# Patient Record
Sex: MALE | Race: WHITE | NOT HISPANIC OR LATINO | Employment: OTHER | URBAN - NONMETROPOLITAN AREA
[De-identification: names, ages, dates, MRNs, and addresses within clinical notes are randomized per-mention and may not be internally consistent; named-entity substitution may affect disease eponyms.]

---

## 2017-01-10 ENCOUNTER — OFFICE VISIT (OUTPATIENT)
Dept: FAMILY MEDICINE CLINIC | Facility: CLINIC | Age: 77
End: 2017-01-10

## 2017-01-10 VITALS
HEIGHT: 70 IN | BODY MASS INDEX: 20.9 KG/M2 | HEART RATE: 92 BPM | OXYGEN SATURATION: 82 % | WEIGHT: 146 LBS | SYSTOLIC BLOOD PRESSURE: 180 MMHG | DIASTOLIC BLOOD PRESSURE: 90 MMHG | TEMPERATURE: 98.6 F

## 2017-01-10 DIAGNOSIS — N52.9 IMPOTENCE: ICD-10-CM

## 2017-01-10 DIAGNOSIS — J06.9 ACUTE URI: ICD-10-CM

## 2017-01-10 DIAGNOSIS — R53.83 MALAISE AND FATIGUE: Primary | ICD-10-CM

## 2017-01-10 DIAGNOSIS — J44.9 CHRONIC OBSTRUCTIVE PULMONARY DISEASE, UNSPECIFIED COPD TYPE (HCC): ICD-10-CM

## 2017-01-10 DIAGNOSIS — R53.81 MALAISE AND FATIGUE: Primary | ICD-10-CM

## 2017-01-10 DIAGNOSIS — M54.9 BACK PAIN, UNSPECIFIED BACK LOCATION, UNSPECIFIED BACK PAIN LATERALITY, UNSPECIFIED CHRONICITY: ICD-10-CM

## 2017-01-10 LAB
EXPIRATION DATE: NORMAL
FLUAV AG NPH QL: NORMAL
FLUBV AG NPH QL: NORMAL
INTERNAL CONTROL: NORMAL
Lab: NORMAL

## 2017-01-10 PROCEDURE — 87804 INFLUENZA ASSAY W/OPTIC: CPT | Performed by: FAMILY MEDICINE

## 2017-01-10 PROCEDURE — 99214 OFFICE O/P EST MOD 30 MIN: CPT | Performed by: FAMILY MEDICINE

## 2017-01-10 PROCEDURE — 96372 THER/PROPH/DIAG INJ SC/IM: CPT | Performed by: FAMILY MEDICINE

## 2017-01-10 RX ORDER — SILDENAFIL 100 MG/1
TABLET, FILM COATED ORAL
Qty: 5 TABLET | Refills: 11 | Status: SHIPPED | OUTPATIENT
Start: 2017-01-10 | End: 2017-02-09 | Stop reason: SDUPTHER

## 2017-01-10 RX ORDER — ALBUTEROL SULFATE 2.5 MG/3ML
2.5 SOLUTION RESPIRATORY (INHALATION)
Qty: 300 ML | Refills: 12 | Status: SHIPPED | OUTPATIENT
Start: 2017-01-10 | End: 2018-06-04 | Stop reason: SDUPTHER

## 2017-01-10 RX ORDER — TRIAMCINOLONE ACETONIDE 40 MG/ML
80 INJECTION, SUSPENSION INTRA-ARTICULAR; INTRAMUSCULAR ONCE
Status: COMPLETED | OUTPATIENT
Start: 2017-01-10 | End: 2017-01-10

## 2017-01-10 RX ORDER — FLUTICASONE PROPIONATE 50 MCG
2 SPRAY, SUSPENSION (ML) NASAL DAILY
Qty: 1 EACH | Refills: 11 | Status: SHIPPED | OUTPATIENT
Start: 2017-01-10 | End: 2017-02-09

## 2017-01-10 RX ORDER — LORATADINE 10 MG/1
10-20 TABLET ORAL DAILY
Qty: 60 TABLET | Refills: 0 | Status: SHIPPED | OUTPATIENT
Start: 2017-01-10 | End: 2017-02-08 | Stop reason: SDUPTHER

## 2017-01-10 RX ORDER — TRAMADOL HYDROCHLORIDE 50 MG/1
50 TABLET ORAL EVERY 6 HOURS PRN
Qty: 120 TABLET | Refills: 2 | Status: SHIPPED | OUTPATIENT
Start: 2017-01-10 | End: 2017-05-17 | Stop reason: SDUPTHER

## 2017-01-10 RX ADMIN — TRIAMCINOLONE ACETONIDE 80 MG: 40 INJECTION, SUSPENSION INTRA-ARTICULAR; INTRAMUSCULAR at 18:02

## 2017-01-10 NOTE — MR AVS SNAPSHOT
Fuad Brumfield   1/10/2017 3:15 PM   Office Visit    Dept Phone:  [phone]   Encounter #:  [id number]    Provider:  Thomas Pierson MD   Department:  [facility]                Your Full Care Plan              Today's Medication Changes          These changes are accurate as of: 1/10/17  4:56 PM.  If you have any questions, ask your nurse or doctor.               New Medication(s)Ordered:     fluticasone 50 MCG/ACT nasal spray   Commonly known as:  FLONASE   2 sprays into each nostril Daily for 30 days. Administer 2 sprays in each nostril for each dose.   Started by:  Thomas Pierson MD       ipratropium 0.02 % nebulizer solution   Commonly known as:  ATROVENT   Take 2.5 mL by nebulization 4 (Four) Times a Day.   Started by:  Thomas Pierson MD         Medication(s)that have changed:     albuterol (2.5 MG/3ML) 0.083% nebulizer solution   Commonly known as:  PROVENTIL   Take 2.5 mg by nebulization 4 (Four) Times a Day. Inhale via nebulizer four times daily as needed.   What changed:    - how much to take  - when to take this   Changed by:  Thomas Pierson MD       loratadine 10 MG tablet   Commonly known as:  CLARITIN   Take 1-2 tablets by mouth Daily. Take one or two tablets daily.   What changed:    - how much to take  - when to take this   Changed by:  Thomas Pierson MD            Where to Get Your Medications      These medications were sent to [facility] [id number] [place], [place] [address] [facility] [phone] [facility] [phone]   [address] [facility] [address]     Phone:  [phone]     albuterol (2.5 MG/3ML) 0.083% nebulizer solution    fluticasone 50 MCG/ACT nasal spray    ipratropium 0.02 % nebulizer solution    loratadine 10 MG tablet    sildenafil 100 MG tablet         You can get these medications from any pharmacy     Bring a paper prescription for each of these medications     traMADol 50 MG  tablet                  Your Updated Medication List          This list is accurate as of: 1/10/17  4:56 PM.  Always use your most recent med list.                albuterol (2.5 MG/3ML) 0.083% nebulizer solution   Commonly known as:  PROVENTIL   Take 2.5 mg by nebulization 4 (Four) Times a Day. Inhale via nebulizer four times daily as needed.       ammonium lactate 12 % lotion   Commonly known as:  LAC-HYDRIN       fluticasone 50 MCG/ACT nasal spray   Commonly known as:  FLONASE   2 sprays into each nostril Daily for 30 days. Administer 2 sprays in each nostril for each dose.       hydrOXYzine 25 MG tablet   Commonly known as:  ATARAX   Take 1 tablet by mouth Every 6 (Six) Hours As Needed for itching.       ipratropium 0.02 % nebulizer solution   Commonly known as:  ATROVENT   Take 2.5 mL by nebulization 4 (Four) Times a Day.       loratadine 10 MG tablet   Commonly known as:  CLARITIN   Take 1-2 tablets by mouth Daily. Take one or two tablets daily.       methylphenidate 10 MG tablet   Commonly known as:  RITALIN       sildenafil 100 MG tablet   Commonly known as:  VIAGRA   1/2 to 1 tablet 30 min up to 4 hours before sex.       traMADol 50 MG tablet   Commonly known as:  ULTRAM   Take 1 tablet by mouth Every 6 (Six) Hours As Needed for moderate pain (4-6) or severe pain (7-10).       traZODone 50 MG tablet   Commonly known as:  DESYREL   1-3 tabs hs prn insomnia.               We Performed the Following     POC Influenza A / B       You Were Diagnosed With        Codes Comments    Malaise and fatigue    -  Primary ICD-10-CM: R53.81, R53.83  ICD-9-CM: 780.79     Impotence     ICD-10-CM: N52.9  ICD-9-CM: 607.84     Back pain, unspecified back location, unspecified back pain laterality, unspecified chronicity     ICD-10-CM: M54.9  ICD-9-CM: 724.5     Acute URI     ICD-10-CM: J06.9  ICD-9-CM: 465.9     Chronic obstructive pulmonary disease, unspecified COPD type     ICD-10-CM: J44.9  ICD-9-CM: 496       Instructions   "   None    Patient Instructions History      Upcoming Appointments     Visit Type Date Time Department    OFFICE VISIT 1/10/2017  3:15 PM [facility]    OFFICE VISIT 4/10/2017  2:00 PM [facility] DAWS[facility]      MyChart Signup     Our records indicate that you have declined [facility] MyChart signup. If you would like to sign up for MyChart, please email [email or web address] or call [phone] to obtain an activation code.             Other Info from Your Visit           Your Appointments     Apr 10, 2017  2:00 PM CDT   Office Visit with Thomas Pierson MD   [facility] (--)    [address]  [facility] [address] [phone]           Arrive 15 minutes prior to appointment.              Allergies     Penicillins        Reason for Visit     Nasal Congestion     URI     Fatigue     Pain back pain      Vital Signs     Blood Pressure Pulse Temperature Height    180/90 (BP Location: Right arm, Patient Position: Sitting, Cuff Size: Adult) 92 98.6 °F (37 °C) (Temporal Artery ) 70\" (177.8 cm)    Weight Oxygen Saturation Body Mass Index Smoking Status    146 lb (66.2 kg) 82% 20.95 kg/m2 Former Smoker      Problems and Diagnoses Noted     Malaise and fatigue    -  Primary    Impotence        Back pain        Acute upper respiratory infection        Chronic airway obstruction            "

## 2017-01-11 NOTE — PROGRESS NOTES
Subjective   Fuad Brumfield is a 76 y.o. male.     History of Present Illness     Needs refills.  Not feeling well since Saturday. Had body aches and chills. No fever.  Saturday was weak and muscles hurt.  viagra not working  Wants refill of ultram.    Review of Systems   Constitutional: Positive for chills and fatigue. Negative for fever.   HENT: Negative for congestion, ear discharge, ear pain, facial swelling, hearing loss, postnasal drip, rhinorrhea, sinus pressure, sore throat, trouble swallowing and voice change.    Eyes: Negative for discharge, redness and visual disturbance.   Respiratory: Negative for cough, chest tightness, shortness of breath and wheezing.    Cardiovascular: Negative for chest pain and palpitations.   Gastrointestinal: Negative for abdominal pain, blood in stool, constipation, diarrhea, nausea and vomiting.   Endocrine: Negative for polydipsia and polyuria.   Genitourinary: Negative for dysuria, flank pain, hematuria and urgency.   Musculoskeletal: Positive for back pain. Negative for arthralgias, joint swelling and myalgias.   Skin: Negative for rash.   Neurological: Negative for dizziness, weakness, numbness and headaches.   Hematological: Negative for adenopathy.   Psychiatric/Behavioral: Negative for confusion and sleep disturbance. The patient is not nervous/anxious.        Objective   Physical Exam   Constitutional: He is oriented to person, place, and time. He appears well-developed and well-nourished.   HENT:   Head: Normocephalic and atraumatic.   Right Ear: External ear normal.   Left Ear: External ear normal.   Nose: Nose normal.   Mouth/Throat: Oropharynx is clear and moist.   Eyes: Conjunctivae and EOM are normal. Pupils are equal, round, and reactive to light.   Neck: Normal range of motion. Neck supple.   Cardiovascular: Normal rate, regular rhythm and normal heart sounds.  Exam reveals no gallop and no friction rub.    No murmur heard.  Pulmonary/Chest: Effort normal.    Diminished but clear   Abdominal: Soft. Bowel sounds are normal. He exhibits no distension. There is no tenderness. There is no rebound and no guarding.   Musculoskeletal: Normal range of motion. He exhibits no edema or deformity.   Neurological: He is alert and oriented to person, place, and time. No cranial nerve deficit.   Skin: Skin is warm and dry. No rash noted. No erythema.   Psychiatric: He has a normal mood and affect. His behavior is normal. Judgment and thought content normal.   Nursing note and vitals reviewed.      Assessment/Plan   Fuad was seen today for nasal congestion, uri, fatigue and pain.    Diagnoses and all orders for this visit:    Malaise and fatigue  -     POC Influenza A / B    Impotence  -     sildenafil (VIAGRA) 100 MG tablet; 1/2 to 1 tablet 30 min up to 4 hours before sex.    Back pain, unspecified back location, unspecified back pain laterality, unspecified chronicity  -     traMADol (ULTRAM) 50 MG tablet; Take 1 tablet by mouth Every 6 (Six) Hours As Needed for moderate pain (4-6) or severe pain (7-10).  -     triamcinolone acetonide (KENALOG-40) injection 80 mg; Inject 2 mL into the shoulder, thigh, or buttocks 1 (One) Time.    Acute URI  -     loratadine (CLARITIN) 10 MG tablet; Take 1-2 tablets by mouth Daily. Take one or two tablets daily.  -     fluticasone (FLONASE) 50 MCG/ACT nasal spray; 2 sprays into each nostril Daily for 30 days. Administer 2 sprays in each nostril for each dose.    Chronic obstructive pulmonary disease, unspecified COPD type  -     albuterol (PROVENTIL) (2.5 MG/3ML) 0.083% nebulizer solution; Take 2.5 mg by nebulization 4 (Four) Times a Day. Inhale via nebulizer four times daily as needed.  -     ipratropium (ATROVENT) 0.02 % nebulizer solution; Take 2.5 mL by nebulization 4 (Four) Times a Day.        Flu neg.  Naresh:

## 2017-02-08 ENCOUNTER — TELEPHONE (OUTPATIENT)
Dept: FAMILY MEDICINE CLINIC | Facility: CLINIC | Age: 77
End: 2017-02-08

## 2017-02-08 DIAGNOSIS — J06.9 ACUTE URI: ICD-10-CM

## 2017-02-08 RX ORDER — LORATADINE 10 MG/1
10-20 TABLET ORAL DAILY
Qty: 60 TABLET | Refills: 0 | Status: SHIPPED | OUTPATIENT
Start: 2017-02-08 | End: 2018-06-04 | Stop reason: SDUPTHER

## 2017-02-08 NOTE — TELEPHONE ENCOUNTER
----- Message from Kimberli Guzman sent at 2/8/2017  4:11 PM CST -----  Contact: [phone]  PT WANTS REFILL FOR LORATADINE AND HE WANTS STRONGER MG FOR VIAGRADUNIA Gary.

## 2017-02-09 ENCOUNTER — TELEPHONE (OUTPATIENT)
Dept: FAMILY MEDICINE CLINIC | Facility: CLINIC | Age: 77
End: 2017-02-09

## 2017-02-09 DIAGNOSIS — N52.9 IMPOTENCE: ICD-10-CM

## 2017-02-09 RX ORDER — SILDENAFIL 100 MG/1
TABLET, FILM COATED ORAL
Qty: 5 TABLET | Refills: 11 | Status: SHIPPED | OUTPATIENT
Start: 2017-02-09 | End: 2017-07-10 | Stop reason: SDUPTHER

## 2017-02-09 RX ORDER — MODAFINIL 200 MG/1
200 TABLET ORAL DAILY
Qty: 30 TABLET | Refills: 2 | Status: SHIPPED | OUTPATIENT
Start: 2017-02-09 | End: 2017-05-17 | Stop reason: SDUPTHER

## 2017-02-09 NOTE — TELEPHONE ENCOUNTER
----- Message from Kimberli Guzman sent at 2/9/2017 12:59 PM CST -----  PT WANTS REFILL FOR     VIAGRA 100 MG, DOESN'T WANT TO SEE SPECIALIST.  PROVIGIL 200 MG, 1QD    [facility]

## 2017-04-10 ENCOUNTER — OFFICE VISIT (OUTPATIENT)
Dept: FAMILY MEDICINE CLINIC | Facility: CLINIC | Age: 77
End: 2017-04-10

## 2017-04-10 VITALS
DIASTOLIC BLOOD PRESSURE: 72 MMHG | WEIGHT: 142.2 LBS | HEART RATE: 72 BPM | TEMPERATURE: 98.2 F | OXYGEN SATURATION: 91 % | SYSTOLIC BLOOD PRESSURE: 160 MMHG | BODY MASS INDEX: 20.36 KG/M2 | HEIGHT: 70 IN

## 2017-04-10 DIAGNOSIS — J44.9 CHRONIC OBSTRUCTIVE PULMONARY DISEASE, UNSPECIFIED COPD TYPE (HCC): ICD-10-CM

## 2017-04-10 DIAGNOSIS — M54.9 BACK PAIN, UNSPECIFIED BACK LOCATION, UNSPECIFIED BACK PAIN LATERALITY, UNSPECIFIED CHRONICITY: Primary | ICD-10-CM

## 2017-04-10 PROCEDURE — 99213 OFFICE O/P EST LOW 20 MIN: CPT | Performed by: FAMILY MEDICINE

## 2017-04-10 PROCEDURE — 96372 THER/PROPH/DIAG INJ SC/IM: CPT | Performed by: FAMILY MEDICINE

## 2017-04-10 RX ORDER — TRIAMCINOLONE ACETONIDE 40 MG/ML
80 INJECTION, SUSPENSION INTRA-ARTICULAR; INTRAMUSCULAR ONCE
Status: COMPLETED | OUTPATIENT
Start: 2017-04-10 | End: 2017-04-10

## 2017-04-10 RX ADMIN — TRIAMCINOLONE ACETONIDE 80 MG: 40 INJECTION, SUSPENSION INTRA-ARTICULAR; INTRAMUSCULAR at 14:38

## 2017-04-10 NOTE — PROGRESS NOTES
Subjective   Fuad Brumfield is a 76 y.o. male.     History of Present Illness     Still fatigue.  Wants kenalog shot.  Only thing that really helps and last time didn't help much.    Review of Systems   Constitutional: Positive for fatigue. Negative for chills and fever.   HENT: Negative for congestion, ear discharge, ear pain, facial swelling, hearing loss, postnasal drip, rhinorrhea, sinus pressure, sore throat, trouble swallowing and voice change.    Eyes: Negative for discharge, redness and visual disturbance.   Respiratory: Negative for cough, chest tightness, shortness of breath and wheezing.    Cardiovascular: Negative for chest pain and palpitations.   Gastrointestinal: Negative for abdominal pain, blood in stool, constipation, diarrhea, nausea and vomiting.   Endocrine: Negative for polydipsia and polyuria.   Genitourinary: Negative for dysuria, flank pain, hematuria and urgency.   Musculoskeletal: Positive for back pain. Negative for arthralgias, joint swelling and myalgias.   Skin: Negative for rash.   Neurological: Negative for dizziness, weakness, numbness and headaches.   Hematological: Negative for adenopathy.   Psychiatric/Behavioral: Negative for confusion and sleep disturbance. The patient is not nervous/anxious.        Objective   Physical Exam   Constitutional: He is oriented to person, place, and time. He appears well-developed and well-nourished.   HENT:   Head: Normocephalic and atraumatic.   Right Ear: External ear normal.   Left Ear: External ear normal.   Nose: Nose normal.   Eyes: Conjunctivae and EOM are normal. Pupils are equal, round, and reactive to light.   Neck: Normal range of motion.   Pulmonary/Chest: Effort normal.   Musculoskeletal: Normal range of motion.   Neurological: He is alert and oriented to person, place, and time.   Psychiatric: He has a normal mood and affect. His behavior is normal. Judgment and thought content normal.   Nursing note and vitals  reviewed.      Assessment/Plan   Fuad was seen today for back pain.    Diagnoses and all orders for this visit:    Back pain, unspecified back location, unspecified back pain laterality, unspecified chronicity  -     triamcinolone acetonide (KENALOG-40) injection 80 mg; Inject 2 mL into the shoulder, thigh, or buttocks 1 (One) Time.    Chronic obstructive pulmonary disease, unspecified COPD type  -     triamcinolone acetonide (KENALOG-40) injection 80 mg; Inject 2 mL into the shoulder, thigh, or buttocks 1 (One) Time.      Explained multiple reasons for fatiuge.  Nothing can really be done.

## 2017-04-25 ENCOUNTER — OFFICE VISIT (OUTPATIENT)
Dept: FAMILY MEDICINE CLINIC | Facility: CLINIC | Age: 77
End: 2017-04-25

## 2017-04-25 VITALS
WEIGHT: 138.8 LBS | OXYGEN SATURATION: 94 % | BODY MASS INDEX: 19.87 KG/M2 | TEMPERATURE: 98.3 F | HEIGHT: 70 IN | DIASTOLIC BLOOD PRESSURE: 66 MMHG | HEART RATE: 96 BPM | SYSTOLIC BLOOD PRESSURE: 130 MMHG

## 2017-04-25 DIAGNOSIS — F41.9 ANXIETY: Primary | ICD-10-CM

## 2017-04-25 PROCEDURE — 99213 OFFICE O/P EST LOW 20 MIN: CPT | Performed by: FAMILY MEDICINE

## 2017-04-25 RX ORDER — MIRTAZAPINE 15 MG/1
15 TABLET, FILM COATED ORAL NIGHTLY
Qty: 30 TABLET | Refills: 11 | Status: SHIPPED | OUTPATIENT
Start: 2017-04-25 | End: 2018-01-01 | Stop reason: HOSPADM

## 2017-04-25 NOTE — PROGRESS NOTES
Subjective   Fuad Brumfield is a 76 y.o. male.     History of Present Illness     Dog , he accidentally killed it 2 weeks ago with his car door.  Not sleeping.   Wants xanax.  Has had in past.     Review of Systems   Constitutional: Negative for chills, fatigue and fever.   HENT: Negative for congestion, ear discharge, ear pain, facial swelling, hearing loss, postnasal drip, rhinorrhea, sinus pressure, sore throat, trouble swallowing and voice change.    Eyes: Negative for discharge, redness and visual disturbance.   Respiratory: Negative for cough, chest tightness, shortness of breath and wheezing.    Cardiovascular: Negative for chest pain and palpitations.   Gastrointestinal: Negative for abdominal pain, blood in stool, constipation, diarrhea, nausea and vomiting.   Endocrine: Negative for polydipsia and polyuria.   Genitourinary: Negative for dysuria, flank pain, hematuria and urgency.   Musculoskeletal: Negative for arthralgias, back pain, joint swelling and myalgias.   Skin: Negative for rash.   Neurological: Negative for dizziness, weakness, numbness and headaches.   Hematological: Negative for adenopathy.   Psychiatric/Behavioral: Positive for sleep disturbance. Negative for confusion. The patient is nervous/anxious.        Objective   Physical Exam   Constitutional: He is oriented to person, place, and time. He appears well-developed and well-nourished.   HENT:   Head: Normocephalic and atraumatic.   Right Ear: External ear normal.   Left Ear: External ear normal.   Nose: Nose normal.   Eyes: Conjunctivae and EOM are normal. Pupils are equal, round, and reactive to light.   Neck: Normal range of motion.   Pulmonary/Chest: Effort normal.   Musculoskeletal: Normal range of motion.   Neurological: He is alert and oriented to person, place, and time.   Psychiatric: He has a normal mood and affect. His behavior is normal. Judgment and thought content normal.   Nursing note and vitals  reviewed.      Assessment/Plan   Fuad was seen today for anxiety and depression.    Diagnoses and all orders for this visit:    Anxiety    Other orders  -     mirtazapine (REMERON) 15 MG tablet; Take 1 tablet by mouth Every Night.      No xanax.  Encouraged.

## 2017-05-04 DIAGNOSIS — R06.02 SOB (SHORTNESS OF BREATH): Primary | ICD-10-CM

## 2017-05-17 ENCOUNTER — TELEPHONE (OUTPATIENT)
Dept: FAMILY MEDICINE CLINIC | Facility: CLINIC | Age: 77
End: 2017-05-17

## 2017-05-17 DIAGNOSIS — M54.9 BACK PAIN, UNSPECIFIED BACK LOCATION, UNSPECIFIED BACK PAIN LATERALITY, UNSPECIFIED CHRONICITY: ICD-10-CM

## 2017-05-17 RX ORDER — MODAFINIL 200 MG/1
200 TABLET ORAL DAILY
Qty: 30 TABLET | Refills: 2 | Status: SHIPPED | OUTPATIENT
Start: 2017-05-17 | End: 2017-07-10 | Stop reason: SDUPTHER

## 2017-05-17 RX ORDER — TRAMADOL HYDROCHLORIDE 50 MG/1
TABLET ORAL
Qty: 120 TABLET | Refills: 0 | Status: SHIPPED | OUTPATIENT
Start: 2017-05-17 | End: 2017-07-10 | Stop reason: SDUPTHER

## 2017-05-25 ENCOUNTER — OFFICE VISIT (OUTPATIENT)
Dept: PULMONOLOGY | Facility: CLINIC | Age: 77
End: 2017-05-25

## 2017-05-25 VITALS
DIASTOLIC BLOOD PRESSURE: 60 MMHG | HEIGHT: 70 IN | SYSTOLIC BLOOD PRESSURE: 140 MMHG | BODY MASS INDEX: 20.19 KG/M2 | WEIGHT: 141 LBS

## 2017-05-25 DIAGNOSIS — R06.02 SHORTNESS OF BREATH: Primary | ICD-10-CM

## 2017-05-25 DIAGNOSIS — R06.02 SHORTNESS OF BREATH: ICD-10-CM

## 2017-05-25 DIAGNOSIS — J44.9 CHRONIC OBSTRUCTIVE PULMONARY DISEASE, UNSPECIFIED COPD TYPE (HCC): Primary | ICD-10-CM

## 2017-05-25 PROCEDURE — 99203 OFFICE O/P NEW LOW 30 MIN: CPT | Performed by: INTERNAL MEDICINE

## 2017-05-25 PROCEDURE — 94010 BREATHING CAPACITY TEST: CPT | Performed by: INTERNAL MEDICINE

## 2017-06-01 ENCOUNTER — TELEPHONE (OUTPATIENT)
Dept: FAMILY MEDICINE CLINIC | Facility: CLINIC | Age: 77
End: 2017-06-01

## 2017-06-01 NOTE — TELEPHONE ENCOUNTER
PATIENT CALLED. DR MACDONALD GAVE HIM SAMPLE OF ANORO 62.5 / 25 INHALER.    IT HAS HELPED HIM AND HE WOULD LIKE A REFILL. IT IS NOT IN THE MED LIST, BUT DR. MACDONALD'S NOTE DOES MENTION GIVING HIM THE SAMPLE.    [facility]

## 2017-07-10 ENCOUNTER — OFFICE VISIT (OUTPATIENT)
Dept: FAMILY MEDICINE CLINIC | Facility: CLINIC | Age: 77
End: 2017-07-10

## 2017-07-10 VITALS
HEIGHT: 70 IN | OXYGEN SATURATION: 93 % | HEART RATE: 91 BPM | DIASTOLIC BLOOD PRESSURE: 76 MMHG | BODY MASS INDEX: 19.41 KG/M2 | SYSTOLIC BLOOD PRESSURE: 150 MMHG | TEMPERATURE: 98.6 F | WEIGHT: 135.6 LBS

## 2017-07-10 DIAGNOSIS — M54.9 BACK PAIN, UNSPECIFIED BACK LOCATION, UNSPECIFIED BACK PAIN LATERALITY, UNSPECIFIED CHRONICITY: ICD-10-CM

## 2017-07-10 DIAGNOSIS — R53.83 OTHER FATIGUE: Primary | ICD-10-CM

## 2017-07-10 DIAGNOSIS — N52.9 IMPOTENCE: ICD-10-CM

## 2017-07-10 PROCEDURE — 99213 OFFICE O/P EST LOW 20 MIN: CPT | Performed by: FAMILY MEDICINE

## 2017-07-10 RX ORDER — FLUTICASONE PROPIONATE 50 MCG
2 SPRAY, SUSPENSION (ML) NASAL DAILY
COMMUNITY
End: 2018-06-04 | Stop reason: SDUPTHER

## 2017-07-10 RX ORDER — TRAMADOL HYDROCHLORIDE 50 MG/1
50 TABLET ORAL EVERY 6 HOURS PRN
Qty: 120 TABLET | Refills: 2 | Status: SHIPPED | OUTPATIENT
Start: 2017-07-10 | End: 2018-01-01 | Stop reason: SDUPTHER

## 2017-07-10 RX ORDER — MODAFINIL 200 MG/1
200 TABLET ORAL DAILY
Qty: 30 TABLET | Refills: 5 | Status: SHIPPED | OUTPATIENT
Start: 2017-07-10 | End: 2017-10-10 | Stop reason: SDUPTHER

## 2017-07-10 RX ORDER — SILDENAFIL 100 MG/1
TABLET, FILM COATED ORAL
Qty: 5 TABLET | Refills: 11 | Status: SHIPPED | OUTPATIENT
Start: 2017-07-10 | End: 2018-01-22 | Stop reason: SDUPTHER

## 2017-07-10 NOTE — PROGRESS NOTES
Subjective   Fuad Brumfield is a 77 y.o. male.     History of Present Illness     The anoro helped his lungs.    provigil has helped his energy  neesd refill of tramadol also.  Wants viagra refill    Review of Systems   Constitutional: Negative for chills, fatigue and fever.   HENT: Negative for congestion, ear discharge, ear pain, facial swelling, hearing loss, postnasal drip, rhinorrhea, sinus pressure, sore throat, trouble swallowing and voice change.    Eyes: Negative for discharge, redness and visual disturbance.   Respiratory: Negative for cough, chest tightness, shortness of breath and wheezing.    Cardiovascular: Negative for chest pain and palpitations.   Gastrointestinal: Negative for abdominal pain, blood in stool, constipation, diarrhea, nausea and vomiting.   Endocrine: Negative for polydipsia and polyuria.   Genitourinary: Negative for dysuria, flank pain, hematuria and urgency.   Musculoskeletal: Positive for back pain. Negative for arthralgias, joint swelling and myalgias.   Skin: Negative for rash.   Neurological: Negative for dizziness, weakness, numbness and headaches.   Hematological: Negative for adenopathy.   Psychiatric/Behavioral: Negative for confusion and sleep disturbance. The patient is not nervous/anxious.        Objective   Physical Exam   Constitutional: He is oriented to person, place, and time. He appears well-developed and well-nourished.   HENT:   Head: Normocephalic and atraumatic.   Right Ear: External ear normal.   Left Ear: External ear normal.   Nose: Nose normal.   Eyes: Conjunctivae and EOM are normal. Pupils are equal, round, and reactive to light.   Neck: Normal range of motion.   Pulmonary/Chest: Effort normal.   Musculoskeletal: Normal range of motion.   Neurological: He is alert and oriented to person, place, and time.   Psychiatric: He has a normal mood and affect. His behavior is normal. Judgment and thought content normal.   Nursing note and vitals  reviewed.      Assessment/Plan   Fuad was seen today for follow-up.    Diagnoses and all orders for this visit:    Other fatigue    Back pain, unspecified back location, unspecified back pain laterality, unspecified chronicity  -     traMADol (ULTRAM) 50 MG tablet; Take 1 tablet by mouth Every 6 (Six) Hours As Needed for Moderate Pain (4-6).    Impotence  -     sildenafil (VIAGRA) 100 MG tablet; 1/2 to 1 tablet 30 min up to 4 hours before sex.    Other orders  -     modafinil (PROVIGIL) 200 MG tablet; Take 1 tablet by mouth Daily.      [facility] [id number]

## 2017-08-01 ENCOUNTER — OFFICE VISIT (OUTPATIENT)
Dept: FAMILY MEDICINE CLINIC | Facility: CLINIC | Age: 77
End: 2017-08-01

## 2017-08-01 VITALS
TEMPERATURE: 98 F | HEART RATE: 89 BPM | OXYGEN SATURATION: 92 % | BODY MASS INDEX: 19.01 KG/M2 | HEIGHT: 70 IN | SYSTOLIC BLOOD PRESSURE: 144 MMHG | DIASTOLIC BLOOD PRESSURE: 80 MMHG | WEIGHT: 132.8 LBS

## 2017-08-01 DIAGNOSIS — L29.9 ITCHING: Primary | ICD-10-CM

## 2017-08-01 PROCEDURE — 96372 THER/PROPH/DIAG INJ SC/IM: CPT | Performed by: FAMILY MEDICINE

## 2017-08-01 PROCEDURE — 99213 OFFICE O/P EST LOW 20 MIN: CPT | Performed by: FAMILY MEDICINE

## 2017-08-01 RX ORDER — TRIAMCINOLONE ACETONIDE 40 MG/ML
80 INJECTION, SUSPENSION INTRA-ARTICULAR; INTRAMUSCULAR ONCE
Status: COMPLETED | OUTPATIENT
Start: 2017-08-01 | End: 2017-08-01

## 2017-08-01 RX ADMIN — TRIAMCINOLONE ACETONIDE 80 MG: 40 INJECTION, SUSPENSION INTRA-ARTICULAR; INTRAMUSCULAR at 11:30

## 2017-08-01 NOTE — PROGRESS NOTES
Subjective   Fuad Brumfield is a 77 y.o. male.     History of Present Illness     Every few moths skin will itch. Has vistaril, has lac hydrin lotion  Wants steroid shot    Review of Systems   Constitutional: Negative for chills, fatigue and fever.   HENT: Negative for congestion, ear discharge, ear pain, facial swelling, hearing loss, postnasal drip, rhinorrhea, sinus pressure, sore throat, trouble swallowing and voice change.    Eyes: Negative for discharge, redness and visual disturbance.   Respiratory: Negative for cough, chest tightness, shortness of breath and wheezing.    Cardiovascular: Negative for chest pain and palpitations.   Gastrointestinal: Negative for abdominal pain, blood in stool, constipation, diarrhea, nausea and vomiting.   Endocrine: Negative for polydipsia and polyuria.   Genitourinary: Negative for dysuria, flank pain, hematuria and urgency.   Musculoskeletal: Negative for arthralgias, back pain, joint swelling and myalgias.   Skin: Negative for rash.   Neurological: Negative for dizziness, weakness, numbness and headaches.   Hematological: Negative for adenopathy.   Psychiatric/Behavioral: Negative for confusion and sleep disturbance. The patient is not nervous/anxious.        Objective   Physical Exam   Constitutional: He is oriented to person, place, and time. He appears well-developed and well-nourished.   HENT:   Head: Normocephalic and atraumatic.   Right Ear: External ear normal.   Left Ear: External ear normal.   Nose: Nose normal.   Eyes: Conjunctivae and EOM are normal. Pupils are equal, round, and reactive to light.   Neck: Normal range of motion.   Pulmonary/Chest: Effort normal.   Musculoskeletal: Normal range of motion.   Neurological: He is alert and oriented to person, place, and time.   Psychiatric: He has a normal mood and affect. His behavior is normal. Judgment and thought content normal.   Nursing note and vitals reviewed.      Assessment/Plan   Fuad was seen today  for rash.    Diagnoses and all orders for this visit:    Itching  -     triamcinolone acetonide (KENALOG-40) injection 80 mg; Inject 2 mL into the shoulder, thigh, or buttocks 1 (One) Time.

## 2017-10-10 ENCOUNTER — OFFICE VISIT (OUTPATIENT)
Dept: FAMILY MEDICINE CLINIC | Facility: CLINIC | Age: 77
End: 2017-10-10

## 2017-10-10 VITALS
TEMPERATURE: 99 F | OXYGEN SATURATION: 92 % | HEIGHT: 70 IN | BODY MASS INDEX: 19.47 KG/M2 | DIASTOLIC BLOOD PRESSURE: 76 MMHG | SYSTOLIC BLOOD PRESSURE: 140 MMHG | HEART RATE: 68 BPM | WEIGHT: 136 LBS

## 2017-10-10 DIAGNOSIS — R53.83 OTHER FATIGUE: ICD-10-CM

## 2017-10-10 DIAGNOSIS — J43.9 PULMONARY EMPHYSEMA, UNSPECIFIED EMPHYSEMA TYPE (HCC): Primary | ICD-10-CM

## 2017-10-10 PROCEDURE — 96372 THER/PROPH/DIAG INJ SC/IM: CPT | Performed by: FAMILY MEDICINE

## 2017-10-10 PROCEDURE — 99213 OFFICE O/P EST LOW 20 MIN: CPT | Performed by: FAMILY MEDICINE

## 2017-10-10 RX ORDER — TRIAMCINOLONE ACETONIDE 40 MG/ML
80 INJECTION, SUSPENSION INTRA-ARTICULAR; INTRAMUSCULAR ONCE
Status: COMPLETED | OUTPATIENT
Start: 2017-10-10 | End: 2017-10-10

## 2017-10-10 RX ORDER — MODAFINIL 200 MG/1
200 TABLET ORAL DAILY
Qty: 30 TABLET | Refills: 5 | Status: SHIPPED | OUTPATIENT
Start: 2017-10-10 | End: 2017-10-16 | Stop reason: SDUPTHER

## 2017-10-10 RX ADMIN — TRIAMCINOLONE ACETONIDE 80 MG: 40 INJECTION, SUSPENSION INTRA-ARTICULAR; INTRAMUSCULAR at 14:42

## 2017-10-10 NOTE — PROGRESS NOTES
Subjective   Fuad Brumfield is a 77 y.o. male.     History of Present Illness     Wants kenalog shot breathing and back.  He knows this is last one for this year  Also wants provigil refilled.    Review of Systems   Constitutional: Negative for chills, fatigue and fever.   HENT: Negative for congestion, ear discharge, ear pain, facial swelling, hearing loss, postnasal drip, rhinorrhea, sinus pressure, sore throat, trouble swallowing and voice change.    Eyes: Negative for discharge, redness and visual disturbance.   Respiratory: Positive for shortness of breath. Negative for cough, chest tightness and wheezing.    Cardiovascular: Negative for chest pain and palpitations.   Gastrointestinal: Negative for abdominal pain, blood in stool, constipation, diarrhea, nausea and vomiting.   Endocrine: Negative for polydipsia and polyuria.   Genitourinary: Negative for dysuria, flank pain, hematuria and urgency.   Musculoskeletal: Positive for back pain. Negative for arthralgias, joint swelling and myalgias.   Skin: Negative for rash.   Neurological: Negative for dizziness, weakness, numbness and headaches.   Hematological: Negative for adenopathy.   Psychiatric/Behavioral: Negative for confusion and sleep disturbance. The patient is not nervous/anxious.        Objective   Physical Exam   Constitutional: He is oriented to person, place, and time. He appears well-developed and well-nourished.   HENT:   Head: Normocephalic and atraumatic.   Right Ear: External ear normal.   Left Ear: External ear normal.   Nose: Nose normal.   Eyes: Conjunctivae and EOM are normal. Pupils are equal, round, and reactive to light.   Neck: Normal range of motion.   Pulmonary/Chest: Effort normal.   Musculoskeletal: Normal range of motion.   Neurological: He is alert and oriented to person, place, and time.   Psychiatric: He has a normal mood and affect. His behavior is normal. Judgment and thought content normal.   Nursing note and vitals  reviewed.      Assessment/Plan   Fuad was seen today for follow-up.    Diagnoses and all orders for this visit:    Pulmonary emphysema, unspecified emphysema type  -     triamcinolone acetonide (KENALOG-40) injection 80 mg; Inject 2 mL into the shoulder, thigh, or buttocks 1 (One) Time.    Other fatigue    Other orders  -     modafinil (PROVIGIL) 200 MG tablet; Take 1 tablet by mouth Daily.

## 2017-10-16 RX ORDER — MODAFINIL 200 MG/1
TABLET ORAL
Qty: 30 TABLET | Refills: 0 | Status: SHIPPED | OUTPATIENT
Start: 2017-10-16 | End: 2018-01-10 | Stop reason: SDUPTHER

## 2017-11-20 RX ORDER — TRAZODONE HYDROCHLORIDE 50 MG/1
TABLET ORAL
Qty: 90 TABLET | Refills: 11 | Status: SHIPPED | OUTPATIENT
Start: 2017-11-20 | End: 2018-01-01 | Stop reason: HOSPADM

## 2017-11-21 ENCOUNTER — TELEPHONE (OUTPATIENT)
Dept: FAMILY MEDICINE CLINIC | Facility: CLINIC | Age: 77
End: 2017-11-21

## 2017-11-21 NOTE — TELEPHONE ENCOUNTER
PATIENT SAID PHARMACY PICKED UP ALL HIS OXYGEN BECAUSE HE WASN'T USING THE BIG MACHINE ENOUGH, HE SAID IT WAS TOO NOISY AND COULDN'T USE IT AT NIGHT.  HE PREFERRED THE SMALL ONES.  THEY SAID HE WOULD NEED NEW RX.  DOES HE NEED TO BE SEEN TO GET NEW RX.  WE DON'T HAVE ANY OPENINGS UNTIL Monday.  [facility]

## 2017-11-21 NOTE — TELEPHONE ENCOUNTER
HE SAID SMALL PORTABLE OXYGEN TANKS.  HE DID SAY HE USES THE BIG TANK AT NIGHT, BUT DOESN'T USE THE CONCENTRATOR BECAUSE IT IS TOO LOUD.  HE WANTS [facility]INGS INSTEAD OF [facility].

## 2017-11-27 ENCOUNTER — OFFICE VISIT (OUTPATIENT)
Dept: FAMILY MEDICINE CLINIC | Facility: CLINIC | Age: 77
End: 2017-11-27

## 2017-11-27 VITALS
WEIGHT: 137 LBS | TEMPERATURE: 99.3 F | DIASTOLIC BLOOD PRESSURE: 80 MMHG | BODY MASS INDEX: 19.61 KG/M2 | HEIGHT: 70 IN | OXYGEN SATURATION: 97 % | HEART RATE: 88 BPM | SYSTOLIC BLOOD PRESSURE: 150 MMHG

## 2017-11-27 DIAGNOSIS — J44.9 CHRONIC OBSTRUCTIVE PULMONARY DISEASE, UNSPECIFIED COPD TYPE (HCC): Primary | ICD-10-CM

## 2017-11-27 PROCEDURE — 99213 OFFICE O/P EST LOW 20 MIN: CPT | Performed by: FAMILY MEDICINE

## 2017-11-27 NOTE — PROGRESS NOTES
Subjective   Fuad Brumfield is a 77 y.o. male.     History of Present Illness     Here for oxygen certification or recertification.  My understanding is that his oxygen concentrator was taken away due to non-use.  He says it was too noisy.    Now he wants his oxygen again.  He had asked to have tanks since more quiet?  I don't know if he has talked to [facility] and made plans for how his weill obtain his oxygen supplementation.    Review of Systems   Constitutional: Negative for chills, fatigue and fever.   HENT: Negative for congestion, ear discharge, ear pain, facial swelling, hearing loss, postnasal drip, rhinorrhea, sinus pressure, sore throat, trouble swallowing and voice change.    Eyes: Negative for discharge, redness and visual disturbance.   Respiratory: Positive for shortness of breath. Negative for cough, chest tightness and wheezing.    Cardiovascular: Negative for chest pain and palpitations.   Gastrointestinal: Negative for abdominal pain, blood in stool, constipation, diarrhea, nausea and vomiting.   Endocrine: Negative for polydipsia and polyuria.   Genitourinary: Negative for dysuria, flank pain, hematuria and urgency.   Musculoskeletal: Negative for arthralgias, back pain, joint swelling and myalgias.   Skin: Negative for rash.   Neurological: Negative for dizziness, weakness, numbness and headaches.   Hematological: Negative for adenopathy.   Psychiatric/Behavioral: Negative for confusion and sleep disturbance. The patient is not nervous/anxious.        Objective   Physical Exam   Constitutional: He is oriented to person, place, and time. He appears well-developed and well-nourished.   HENT:   Head: Normocephalic and atraumatic.   Right Ear: External ear normal.   Left Ear: External ear normal.   Nose: Nose normal.   Eyes: Conjunctivae and EOM are normal. Pupils are equal, round, and reactive to light.   Neck: Normal range of motion.   Pulmonary/Chest: Effort normal.   Musculoskeletal: Normal range  of motion.   Neurological: He is alert and oriented to person, place, and time.   Psychiatric: He has a normal mood and affect. His behavior is normal. Judgment and thought content normal.   Nursing note and vitals reviewed.      Assessment/Plan   Fuad was seen today for oxygen ck.    Diagnoses and all orders for this visit:    Chronic obstructive pulmonary disease, unspecified COPD type      Walking 3 minutes without oxygen:  o2 sats:  97%, 87%, 86%    Walking with oxygen at 2lpm nc: o2 sats:  98%, 97%, 93%    Note will be sent to [facility] per patient request.

## 2017-12-16 DIAGNOSIS — L29.9 ITCHING: ICD-10-CM

## 2017-12-18 RX ORDER — HYDROXYZINE HYDROCHLORIDE 25 MG/1
TABLET, FILM COATED ORAL
Qty: 120 TABLET | Refills: 11 | Status: SHIPPED | OUTPATIENT
Start: 2017-12-18 | End: 2017-12-19 | Stop reason: SDUPTHER

## 2017-12-19 DIAGNOSIS — L29.9 ITCHING: ICD-10-CM

## 2017-12-19 RX ORDER — HYDROXYZINE HYDROCHLORIDE 25 MG/1
25 TABLET, FILM COATED ORAL EVERY 6 HOURS PRN
Qty: 120 TABLET | Refills: 11 | Status: SHIPPED | OUTPATIENT
Start: 2017-12-19

## 2018-01-01 ENCOUNTER — APPOINTMENT (OUTPATIENT)
Dept: PET IMAGING | Facility: HOSPITAL | Age: 78
End: 2018-01-01

## 2018-01-01 ENCOUNTER — APPOINTMENT (OUTPATIENT)
Dept: LAB | Facility: HOSPITAL | Age: 78
End: 2018-01-01

## 2018-01-01 ENCOUNTER — OFFICE VISIT (OUTPATIENT)
Dept: PULMONOLOGY | Facility: CLINIC | Age: 78
End: 2018-01-01

## 2018-01-01 ENCOUNTER — TELEPHONE (OUTPATIENT)
Dept: FAMILY MEDICINE CLINIC | Facility: CLINIC | Age: 78
End: 2018-01-01

## 2018-01-01 ENCOUNTER — HOSPITAL ENCOUNTER (OUTPATIENT)
Dept: GENERAL RADIOLOGY | Facility: HOSPITAL | Age: 78
Discharge: HOME | End: 2018-10-19
Attending: INTERNAL MEDICINE | Admitting: INTERNAL MEDICINE

## 2018-01-01 ENCOUNTER — HOSPITAL ENCOUNTER (OUTPATIENT)
Dept: PET IMAGING | Facility: HOSPITAL | Age: 78
Discharge: HOME | End: 2018-07-13
Admitting: INTERNAL MEDICINE

## 2018-01-01 ENCOUNTER — TELEPHONE (OUTPATIENT)
Dept: PULMONOLOGY | Facility: CLINIC | Age: 78
End: 2018-01-01

## 2018-01-01 ENCOUNTER — HOSPITAL ENCOUNTER (INPATIENT)
Facility: HOSPITAL | Age: 78
LOS: 4 days | Discharge: HOME HEALTH | End: 2018-06-27
Attending: EMERGENCY MEDICINE | Admitting: FAMILY MEDICINE

## 2018-01-01 ENCOUNTER — LAB (OUTPATIENT)
Dept: LAB | Facility: HOSPITAL | Age: 78
End: 2018-01-01

## 2018-01-01 ENCOUNTER — APPOINTMENT (OUTPATIENT)
Dept: GENERAL RADIOLOGY | Facility: HOSPITAL | Age: 78
End: 2018-01-01

## 2018-01-01 ENCOUNTER — APPOINTMENT (OUTPATIENT)
Dept: ULTRASOUND IMAGING | Facility: HOSPITAL | Age: 78
End: 2018-01-01

## 2018-01-01 ENCOUNTER — OFFICE VISIT (OUTPATIENT)
Dept: FAMILY MEDICINE CLINIC | Facility: CLINIC | Age: 78
End: 2018-01-01

## 2018-01-01 ENCOUNTER — DOCUMENTATION (OUTPATIENT)
Dept: CARDIOLOGY | Facility: CLINIC | Age: 78
End: 2018-01-01

## 2018-01-01 ENCOUNTER — APPOINTMENT (OUTPATIENT)
Dept: CT IMAGING | Facility: HOSPITAL | Age: 78
End: 2018-01-01

## 2018-01-01 VITALS
DIASTOLIC BLOOD PRESSURE: 93 MMHG | TEMPERATURE: 97.1 F | BODY MASS INDEX: 18.68 KG/M2 | WEIGHT: 137.9 LBS | SYSTOLIC BLOOD PRESSURE: 136 MMHG | RESPIRATION RATE: 16 BRPM | HEIGHT: 72 IN | HEART RATE: 95 BPM | OXYGEN SATURATION: 91 %

## 2018-01-01 VITALS
BODY MASS INDEX: 15.35 KG/M2 | HEIGHT: 72 IN | WEIGHT: 113.3 LBS | DIASTOLIC BLOOD PRESSURE: 87 MMHG | OXYGEN SATURATION: 79 % | HEART RATE: 90 BPM | SYSTOLIC BLOOD PRESSURE: 153 MMHG

## 2018-01-01 VITALS
DIASTOLIC BLOOD PRESSURE: 68 MMHG | WEIGHT: 129.5 LBS | SYSTOLIC BLOOD PRESSURE: 124 MMHG | OXYGEN SATURATION: 97 % | HEART RATE: 100 BPM | HEIGHT: 72 IN | BODY MASS INDEX: 17.54 KG/M2

## 2018-01-01 VITALS
OXYGEN SATURATION: 93 % | DIASTOLIC BLOOD PRESSURE: 66 MMHG | HEART RATE: 124 BPM | HEIGHT: 72 IN | BODY MASS INDEX: 15.71 KG/M2 | WEIGHT: 116 LBS | SYSTOLIC BLOOD PRESSURE: 103 MMHG

## 2018-01-01 VITALS
BODY MASS INDEX: 16.39 KG/M2 | DIASTOLIC BLOOD PRESSURE: 68 MMHG | SYSTOLIC BLOOD PRESSURE: 142 MMHG | HEIGHT: 72 IN | OXYGEN SATURATION: 93 % | HEART RATE: 58 BPM | WEIGHT: 121 LBS

## 2018-01-01 VITALS
OXYGEN SATURATION: 90 % | WEIGHT: 119 LBS | HEIGHT: 72 IN | DIASTOLIC BLOOD PRESSURE: 76 MMHG | HEART RATE: 115 BPM | SYSTOLIC BLOOD PRESSURE: 140 MMHG | BODY MASS INDEX: 16.12 KG/M2 | TEMPERATURE: 98.6 F

## 2018-01-01 VITALS
HEART RATE: 99 BPM | DIASTOLIC BLOOD PRESSURE: 72 MMHG | DIASTOLIC BLOOD PRESSURE: 78 MMHG | SYSTOLIC BLOOD PRESSURE: 130 MMHG | HEART RATE: 92 BPM | WEIGHT: 131.1 LBS | HEIGHT: 72 IN | OXYGEN SATURATION: 93 % | BODY MASS INDEX: 17.76 KG/M2 | OXYGEN SATURATION: 97 % | TEMPERATURE: 99 F | SYSTOLIC BLOOD PRESSURE: 151 MMHG

## 2018-01-01 VITALS
BODY MASS INDEX: 15.79 KG/M2 | HEART RATE: 113 BPM | WEIGHT: 116.4 LBS | DIASTOLIC BLOOD PRESSURE: 70 MMHG | OXYGEN SATURATION: 95 % | SYSTOLIC BLOOD PRESSURE: 122 MMHG | TEMPERATURE: 99 F

## 2018-01-01 VITALS
HEIGHT: 66 IN | WEIGHT: 135.6 LBS | HEART RATE: 109 BPM | SYSTOLIC BLOOD PRESSURE: 150 MMHG | BODY MASS INDEX: 21.79 KG/M2 | OXYGEN SATURATION: 96 % | DIASTOLIC BLOOD PRESSURE: 96 MMHG

## 2018-01-01 VITALS
HEART RATE: 88 BPM | BODY MASS INDEX: 19.37 KG/M2 | HEIGHT: 66 IN | DIASTOLIC BLOOD PRESSURE: 82 MMHG | OXYGEN SATURATION: 96 % | WEIGHT: 120.5 LBS | SYSTOLIC BLOOD PRESSURE: 145 MMHG

## 2018-01-01 VITALS
TEMPERATURE: 98.5 F | SYSTOLIC BLOOD PRESSURE: 128 MMHG | OXYGEN SATURATION: 94 % | HEART RATE: 103 BPM | DIASTOLIC BLOOD PRESSURE: 62 MMHG

## 2018-01-01 VITALS
HEIGHT: 66 IN | WEIGHT: 126.6 LBS | HEART RATE: 98 BPM | DIASTOLIC BLOOD PRESSURE: 72 MMHG | OXYGEN SATURATION: 88 % | SYSTOLIC BLOOD PRESSURE: 130 MMHG | TEMPERATURE: 98.5 F | BODY MASS INDEX: 20.34 KG/M2

## 2018-01-01 VITALS
OXYGEN SATURATION: 92 % | BODY MASS INDEX: 17.2 KG/M2 | DIASTOLIC BLOOD PRESSURE: 72 MMHG | SYSTOLIC BLOOD PRESSURE: 124 MMHG | HEART RATE: 94 BPM | WEIGHT: 127 LBS | HEIGHT: 72 IN

## 2018-01-01 DIAGNOSIS — R09.02 HYPOXIA: ICD-10-CM

## 2018-01-01 DIAGNOSIS — J42 CHRONIC BRONCHITIS, UNSPECIFIED CHRONIC BRONCHITIS TYPE (HCC): ICD-10-CM

## 2018-01-01 DIAGNOSIS — F41.9 ANXIETY: ICD-10-CM

## 2018-01-01 DIAGNOSIS — R91.8 LUNG MASS: ICD-10-CM

## 2018-01-01 DIAGNOSIS — B37.0 ORAL THRUSH: Primary | ICD-10-CM

## 2018-01-01 DIAGNOSIS — J96.91 RESPIRATORY FAILURE WITH HYPOXIA, UNSPECIFIED CHRONICITY (HCC): Primary | ICD-10-CM

## 2018-01-01 DIAGNOSIS — J44.1 CHRONIC OBSTRUCTIVE PULMONARY DISEASE WITH ACUTE EXACERBATION (HCC): Primary | ICD-10-CM

## 2018-01-01 DIAGNOSIS — R60.0 LEG EDEMA: ICD-10-CM

## 2018-01-01 DIAGNOSIS — J43.1 PANLOBULAR EMPHYSEMA (HCC): Primary | ICD-10-CM

## 2018-01-01 DIAGNOSIS — J18.9 COMMUNITY ACQUIRED PNEUMONIA OF RIGHT LOWER LOBE OF LUNG: Primary | ICD-10-CM

## 2018-01-01 DIAGNOSIS — F41.9 ANXIETY: Primary | ICD-10-CM

## 2018-01-01 DIAGNOSIS — R91.8 LUNG MASS: Primary | ICD-10-CM

## 2018-01-01 DIAGNOSIS — M54.9 BACK PAIN, UNSPECIFIED BACK LOCATION, UNSPECIFIED BACK PAIN LATERALITY, UNSPECIFIED CHRONICITY: ICD-10-CM

## 2018-01-01 DIAGNOSIS — J43.9 PULMONARY EMPHYSEMA, UNSPECIFIED EMPHYSEMA TYPE (HCC): ICD-10-CM

## 2018-01-01 DIAGNOSIS — R41.82 ALTERED MENTAL STATUS, UNSPECIFIED ALTERED MENTAL STATUS TYPE: ICD-10-CM

## 2018-01-01 DIAGNOSIS — J43.1 PANLOBULAR EMPHYSEMA (HCC): ICD-10-CM

## 2018-01-01 DIAGNOSIS — Z74.09 IMPAIRED FUNCTIONAL MOBILITY, BALANCE, GAIT, AND ENDURANCE: ICD-10-CM

## 2018-01-01 DIAGNOSIS — J42 CHRONIC BRONCHITIS, UNSPECIFIED CHRONIC BRONCHITIS TYPE (HCC): Primary | ICD-10-CM

## 2018-01-01 DIAGNOSIS — J18.9 PNEUMONIA OF RIGHT LOWER LOBE DUE TO INFECTIOUS ORGANISM: Primary | ICD-10-CM

## 2018-01-01 DIAGNOSIS — Z51.5 END OF LIFE CARE: Primary | ICD-10-CM

## 2018-01-01 DIAGNOSIS — J96.11 CHRONIC RESPIRATORY FAILURE WITH HYPOXIA (HCC): ICD-10-CM

## 2018-01-01 DIAGNOSIS — J18.9 PNEUMONIA OF RIGHT LOWER LOBE DUE TO INFECTIOUS ORGANISM: ICD-10-CM

## 2018-01-01 DIAGNOSIS — C34.90 MALIGNANT NEOPLASM OF LUNG, UNSPECIFIED LATERALITY, UNSPECIFIED PART OF LUNG (HCC): Primary | ICD-10-CM

## 2018-01-01 DIAGNOSIS — R59.0 MEDIASTINAL ADENOPATHY: ICD-10-CM

## 2018-01-01 LAB
ALBUMIN SERPL-MCNC: 3.7 G/DL (ref 3.4–4.8)
ALBUMIN/GLOB SERPL: 1 G/DL (ref 1.1–1.8)
ALP SERPL-CCNC: 89 U/L (ref 38–126)
ALT SERPL W P-5'-P-CCNC: 22 U/L (ref 21–72)
ANION GAP SERPL CALCULATED.3IONS-SCNC: 10 MMOL/L (ref 5–15)
ANION GAP SERPL CALCULATED.3IONS-SCNC: 10 MMOL/L (ref 5–15)
ANION GAP SERPL CALCULATED.3IONS-SCNC: 11 MMOL/L (ref 5–15)
ANION GAP SERPL CALCULATED.3IONS-SCNC: 17 MMOL/L (ref 5–15)
ANION GAP SERPL CALCULATED.3IONS-SCNC: 6 MMOL/L (ref 5–15)
ANION GAP SERPL CALCULATED.3IONS-SCNC: 9 MMOL/L (ref 5–15)
ARTERIAL PATENCY WRIST A: ABNORMAL
ARTERIAL PATENCY WRIST A: ABNORMAL
ARTERIAL PATENCY WRIST A: POSITIVE
AST SERPL-CCNC: 31 U/L (ref 17–59)
ATMOSPHERIC PRESS: 743 MMHG
ATMOSPHERIC PRESS: 747 MMHG
ATMOSPHERIC PRESS: 749 MMHG
B PERT DNA SPEC QL NAA+PROBE: NOT DETECTED
BACTERIA SPEC AEROBE CULT: NORMAL
BACTERIA SPEC AEROBE CULT: NORMAL
BACTERIA SPEC RESP CULT: NORMAL
BACTERIA SPEC RESP CULT: NORMAL
BACTERIA UR QL AUTO: ABNORMAL /HPF
BASE EXCESS BLDA CALC-SCNC: -2.8 MMOL/L (ref 0–2)
BASE EXCESS BLDA CALC-SCNC: -3.5 MMOL/L (ref 0–2)
BASE EXCESS BLDA CALC-SCNC: -4.5 MMOL/L (ref 0–2)
BASOPHILS # BLD AUTO: 0.01 10*3/MM3 (ref 0–0.2)
BASOPHILS # BLD AUTO: 0.03 10*3/MM3 (ref 0–0.2)
BASOPHILS # BLD AUTO: 0.03 10*3/MM3 (ref 0–0.2)
BASOPHILS NFR BLD AUTO: 0 % (ref 0–2)
BASOPHILS NFR BLD AUTO: 0.1 % (ref 0–2)
BASOPHILS NFR BLD AUTO: 0.2 % (ref 0–2)
BDY SITE: ABNORMAL
BILIRUB BLD-MCNC: NEGATIVE MG/DL
BILIRUB SERPL-MCNC: 1.2 MG/DL (ref 0.2–1.3)
BILIRUB UR QL STRIP: ABNORMAL
BUN BLD-MCNC: 17 MG/DL (ref 7–21)
BUN BLD-MCNC: 20 MG/DL (ref 7–21)
BUN BLD-MCNC: 24 MG/DL (ref 7–21)
BUN BLD-MCNC: 26 MG/DL (ref 7–21)
BUN BLD-MCNC: 30 MG/DL (ref 7–21)
BUN BLD-MCNC: 38 MG/DL (ref 7–21)
BUN/CREAT SERPL: 19.1 (ref 7–25)
BUN/CREAT SERPL: 23.8 (ref 7–25)
BUN/CREAT SERPL: 28.9 (ref 7–25)
BUN/CREAT SERPL: 29.2 (ref 7–25)
BUN/CREAT SERPL: 32.3 (ref 7–25)
BUN/CREAT SERPL: 40.4 (ref 7–25)
C PNEUM DNA NPH QL NAA+NON-PROBE: NOT DETECTED
CALCIUM SPEC-SCNC: 7.8 MG/DL (ref 8.4–10.2)
CALCIUM SPEC-SCNC: 8.2 MG/DL (ref 8.4–10.2)
CALCIUM SPEC-SCNC: 8.2 MG/DL (ref 8.4–10.2)
CALCIUM SPEC-SCNC: 8.3 MG/DL (ref 8.4–10.2)
CALCIUM SPEC-SCNC: 8.5 MG/DL (ref 8.4–10.2)
CALCIUM SPEC-SCNC: 8.9 MG/DL (ref 8.4–10.2)
CHLORIDE SERPL-SCNC: 101 MMOL/L (ref 95–110)
CHLORIDE SERPL-SCNC: 106 MMOL/L (ref 95–110)
CHLORIDE SERPL-SCNC: 108 MMOL/L (ref 95–110)
CHLORIDE SERPL-SCNC: 110 MMOL/L (ref 95–110)
CK MB SERPL-CCNC: 1.88 NG/ML (ref 0–5)
CK SERPL-CCNC: 61 U/L (ref 55–170)
CLARITY UR: CLEAR
CLARITY, POC: CLEAR
CO2 SERPL-SCNC: 18 MMOL/L (ref 22–31)
CO2 SERPL-SCNC: 22 MMOL/L (ref 22–31)
CO2 SERPL-SCNC: 24 MMOL/L (ref 22–31)
CO2 SERPL-SCNC: 26 MMOL/L (ref 22–31)
COLOR UR: ABNORMAL
COLOR UR: YELLOW
CREAT BLD-MCNC: 0.83 MG/DL (ref 0.7–1.3)
CREAT BLD-MCNC: 0.84 MG/DL (ref 0.7–1.3)
CREAT BLD-MCNC: 0.89 MG/DL (ref 0.7–1.3)
CREAT BLD-MCNC: 0.89 MG/DL (ref 0.7–1.3)
CREAT BLD-MCNC: 0.93 MG/DL (ref 0.7–1.3)
CREAT BLD-MCNC: 0.94 MG/DL (ref 0.7–1.3)
D-DIMER, QUANTITATIVE (MAD,POW, STR): 2636 NG/ML (FEU) (ref 0–470)
D-LACTATE SERPL-SCNC: 1.3 MMOL/L (ref 0.5–2)
DEPRECATED RDW RBC AUTO: 44.2 FL (ref 35.1–43.9)
DEPRECATED RDW RBC AUTO: 44.7 FL (ref 35.1–43.9)
DEPRECATED RDW RBC AUTO: 44.8 FL (ref 35.1–43.9)
DEPRECATED RDW RBC AUTO: 45.3 FL (ref 35.1–43.9)
DEPRECATED RDW RBC AUTO: 45.6 FL (ref 35.1–43.9)
EOSINOPHIL # BLD AUTO: 0 10*3/MM3 (ref 0–0.7)
EOSINOPHIL # BLD AUTO: 0 10*3/MM3 (ref 0–0.7)
EOSINOPHIL # BLD AUTO: 0.01 10*3/MM3 (ref 0–0.7)
EOSINOPHIL # BLD AUTO: 0.09 10*3/MM3 (ref 0–0.7)
EOSINOPHIL # BLD AUTO: 0.12 10*3/MM3 (ref 0–0.7)
EOSINOPHIL NFR BLD AUTO: 0 % (ref 0–7)
EOSINOPHIL NFR BLD AUTO: 0 % (ref 0–7)
EOSINOPHIL NFR BLD AUTO: 0.1 % (ref 0–7)
EOSINOPHIL NFR BLD AUTO: 0.4 % (ref 0–7)
EOSINOPHIL NFR BLD AUTO: 0.6 % (ref 0–7)
ERYTHROCYTE [DISTWIDTH] IN BLOOD BY AUTOMATED COUNT: 13.4 % (ref 11.5–14.5)
ERYTHROCYTE [DISTWIDTH] IN BLOOD BY AUTOMATED COUNT: 13.5 % (ref 11.5–14.5)
ERYTHROCYTE [DISTWIDTH] IN BLOOD BY AUTOMATED COUNT: 13.6 % (ref 11.5–14.5)
ERYTHROCYTE [DISTWIDTH] IN BLOOD BY AUTOMATED COUNT: 13.6 % (ref 11.5–14.5)
ERYTHROCYTE [DISTWIDTH] IN BLOOD BY AUTOMATED COUNT: 13.7 % (ref 11.5–14.5)
FLUAV H1 2009 PAND RNA NPH QL NAA+PROBE: NOT DETECTED
FLUAV H1 HA GENE NPH QL NAA+PROBE: NOT DETECTED
FLUAV H3 RNA NPH QL NAA+PROBE: NOT DETECTED
FLUAV SUBTYP SPEC NAA+PROBE: NOT DETECTED
FLUBV RNA ISLT QL NAA+PROBE: NOT DETECTED
GAS FLOW AIRWAY: 4 LPM
GAS FLOW AIRWAY: 6 LPM
GFR SERPL CREATININE-BSD FRML MDRD: 78 ML/MIN/1.73 (ref 42–98)
GFR SERPL CREATININE-BSD FRML MDRD: 79 ML/MIN/1.73 (ref 42–98)
GFR SERPL CREATININE-BSD FRML MDRD: 83 ML/MIN/1.73 (ref 42–98)
GFR SERPL CREATININE-BSD FRML MDRD: 83 ML/MIN/1.73 (ref 42–98)
GFR SERPL CREATININE-BSD FRML MDRD: 88 ML/MIN/1.73 (ref 42–98)
GFR SERPL CREATININE-BSD FRML MDRD: 90 ML/MIN/1.73 (ref 42–98)
GLOBULIN UR ELPH-MCNC: 3.7 GM/DL (ref 2.3–3.5)
GLUCOSE BLD-MCNC: 135 MG/DL (ref 60–100)
GLUCOSE BLD-MCNC: 156 MG/DL (ref 60–100)
GLUCOSE BLD-MCNC: 162 MG/DL (ref 60–100)
GLUCOSE BLD-MCNC: 169 MG/DL (ref 60–100)
GLUCOSE BLD-MCNC: 93 MG/DL (ref 60–100)
GLUCOSE BLD-MCNC: 94 MG/DL (ref 60–100)
GLUCOSE UR STRIP-MCNC: NEGATIVE MG/DL
GLUCOSE UR STRIP-MCNC: NEGATIVE MG/DL
GRAM STN SPEC: NORMAL
HADV DNA SPEC NAA+PROBE: NOT DETECTED
HCO3 BLDA-SCNC: 18 MMOL/L (ref 20–26)
HCO3 BLDA-SCNC: 18.4 MMOL/L (ref 20–26)
HCO3 BLDA-SCNC: 19.5 MMOL/L (ref 20–26)
HCOV 229E RNA SPEC QL NAA+PROBE: NOT DETECTED
HCOV HKU1 RNA SPEC QL NAA+PROBE: NOT DETECTED
HCOV NL63 RNA SPEC QL NAA+PROBE: NOT DETECTED
HCOV OC43 RNA SPEC QL NAA+PROBE: NOT DETECTED
HCT VFR BLD AUTO: 31 % (ref 39–49)
HCT VFR BLD AUTO: 33 % (ref 39–49)
HCT VFR BLD AUTO: 33.7 % (ref 39–49)
HCT VFR BLD AUTO: 35.1 % (ref 39–49)
HCT VFR BLD AUTO: 36.2 % (ref 39–49)
HGB BLD-MCNC: 10.5 G/DL (ref 13.7–17.3)
HGB BLD-MCNC: 11.3 G/DL (ref 13.7–17.3)
HGB BLD-MCNC: 11.5 G/DL (ref 13.7–17.3)
HGB BLD-MCNC: 12.1 G/DL (ref 13.7–17.3)
HGB BLD-MCNC: 12.6 G/DL (ref 13.7–17.3)
HGB UR QL STRIP.AUTO: NEGATIVE
HMPV RNA NPH QL NAA+NON-PROBE: NOT DETECTED
HOLD SPECIMEN: NORMAL
HOROWITZ INDEX BLD+IHG-RTO: 40 %
HPIV1 RNA SPEC QL NAA+PROBE: NOT DETECTED
HPIV2 RNA SPEC QL NAA+PROBE: NOT DETECTED
HPIV3 RNA NPH QL NAA+PROBE: NOT DETECTED
HPIV4 P GENE NPH QL NAA+PROBE: NOT DETECTED
HYALINE CASTS UR QL AUTO: ABNORMAL /LPF
IMM GRANULOCYTES # BLD: 0.07 10*3/MM3 (ref 0–0.02)
IMM GRANULOCYTES # BLD: 0.08 10*3/MM3 (ref 0–0.02)
IMM GRANULOCYTES # BLD: 0.09 10*3/MM3 (ref 0–0.02)
IMM GRANULOCYTES # BLD: 0.13 10*3/MM3 (ref 0–0.02)
IMM GRANULOCYTES # BLD: 0.17 10*3/MM3 (ref 0–0.02)
IMM GRANULOCYTES NFR BLD: 0.5 % (ref 0–0.5)
IMM GRANULOCYTES NFR BLD: 0.5 % (ref 0–0.5)
IMM GRANULOCYTES NFR BLD: 0.6 % (ref 0–0.5)
IMM GRANULOCYTES NFR BLD: 0.6 % (ref 0–0.5)
IMM GRANULOCYTES NFR BLD: 0.8 % (ref 0–0.5)
KETONES UR QL STRIP: ABNORMAL
KETONES UR QL: NEGATIVE
L PNEUMO1 AG UR QL IA: NEGATIVE
LAB AP CASE REPORT: NORMAL
LAB AP DIAGNOSIS COMMENT: NORMAL
LEUKOCYTE EST, POC: NEGATIVE
LEUKOCYTE ESTERASE UR QL STRIP.AUTO: NEGATIVE
LIPASE SERPL-CCNC: 79 U/L (ref 23–300)
LYMPHOCYTES # BLD AUTO: 0.51 10*3/MM3 (ref 0.6–4.2)
LYMPHOCYTES # BLD AUTO: 0.87 10*3/MM3 (ref 0.6–4.2)
LYMPHOCYTES # BLD AUTO: 1.07 10*3/MM3 (ref 0.6–4.2)
LYMPHOCYTES # BLD AUTO: 1.09 10*3/MM3 (ref 0.6–4.2)
LYMPHOCYTES # BLD AUTO: 1.19 10*3/MM3 (ref 0.6–4.2)
LYMPHOCYTES NFR BLD AUTO: 3.9 % (ref 10–50)
LYMPHOCYTES NFR BLD AUTO: 5.3 % (ref 10–50)
LYMPHOCYTES NFR BLD AUTO: 5.9 % (ref 10–50)
LYMPHOCYTES NFR BLD AUTO: 5.9 % (ref 10–50)
LYMPHOCYTES NFR BLD AUTO: 6.4 % (ref 10–50)
Lab: ABNORMAL
M PNEUMO IGG SER IA-ACNC: NOT DETECTED
MAGNESIUM SERPL-MCNC: 1.9 MG/DL (ref 1.6–2.3)
MCH RBC QN AUTO: 30.7 PG (ref 26.5–34)
MCH RBC QN AUTO: 30.8 PG (ref 26.5–34)
MCH RBC QN AUTO: 31 PG (ref 26.5–34)
MCH RBC QN AUTO: 31.2 PG (ref 26.5–34)
MCH RBC QN AUTO: 31.7 PG (ref 26.5–34)
MCHC RBC AUTO-ENTMCNC: 33.9 G/DL (ref 31.5–36.3)
MCHC RBC AUTO-ENTMCNC: 34.1 G/DL (ref 31.5–36.3)
MCHC RBC AUTO-ENTMCNC: 34.2 G/DL (ref 31.5–36.3)
MCHC RBC AUTO-ENTMCNC: 34.5 G/DL (ref 31.5–36.3)
MCHC RBC AUTO-ENTMCNC: 34.8 G/DL (ref 31.5–36.3)
MCV RBC AUTO: 89.3 FL (ref 80–98)
MCV RBC AUTO: 89.9 FL (ref 80–98)
MCV RBC AUTO: 91 FL (ref 80–98)
MCV RBC AUTO: 91.2 FL (ref 80–98)
MCV RBC AUTO: 91.4 FL (ref 80–98)
MODALITY: ABNORMAL
MONOCYTES # BLD AUTO: 0.24 10*3/MM3 (ref 0–0.9)
MONOCYTES # BLD AUTO: 0.53 10*3/MM3 (ref 0–0.9)
MONOCYTES # BLD AUTO: 0.68 10*3/MM3 (ref 0–0.9)
MONOCYTES # BLD AUTO: 1.12 10*3/MM3 (ref 0–0.9)
MONOCYTES # BLD AUTO: 1.44 10*3/MM3 (ref 0–0.9)
MONOCYTES NFR BLD AUTO: 1.8 % (ref 0–12)
MONOCYTES NFR BLD AUTO: 3.4 % (ref 0–12)
MONOCYTES NFR BLD AUTO: 3.9 % (ref 0–12)
MONOCYTES NFR BLD AUTO: 6 % (ref 0–12)
MONOCYTES NFR BLD AUTO: 7.1 % (ref 0–12)
NEUTROPHILS # BLD AUTO: 12.08 10*3/MM3 (ref 2–8.6)
NEUTROPHILS # BLD AUTO: 12.22 10*3/MM3 (ref 2–8.6)
NEUTROPHILS # BLD AUTO: 16.07 10*3/MM3 (ref 2–8.6)
NEUTROPHILS # BLD AUTO: 17.54 10*3/MM3 (ref 2–8.6)
NEUTROPHILS # BLD AUTO: 18.21 10*3/MM3 (ref 2–8.6)
NEUTROPHILS NFR BLD AUTO: 86.3 % (ref 37–80)
NEUTROPHILS NFR BLD AUTO: 86.8 % (ref 37–80)
NEUTROPHILS NFR BLD AUTO: 89 % (ref 37–80)
NEUTROPHILS NFR BLD AUTO: 90.1 % (ref 37–80)
NEUTROPHILS NFR BLD AUTO: 93.6 % (ref 37–80)
NITRITE UR QL STRIP: NEGATIVE
NITRITE UR-MCNC: NEGATIVE MG/ML
NRBC BLD MANUAL-RTO: 0 /100 WBC (ref 0–0)
PATH REPORT.FINAL DX SPEC: NORMAL
PCO2 BLDA: 23.9 MM HG (ref 35–45)
PCO2 BLDA: 25 MM HG (ref 35–45)
PCO2 BLDA: 26.5 MM HG (ref 35–45)
PH BLDA: 7.47 PH UNITS (ref 7.35–7.45)
PH BLDA: 7.47 PH UNITS (ref 7.35–7.45)
PH BLDA: 7.49 PH UNITS (ref 7.35–7.45)
PH UR STRIP.AUTO: 5.5 [PH] (ref 5–9)
PH UR: 5 [PH] (ref 5–8)
PLATELET # BLD AUTO: 175 10*3/MM3 (ref 150–450)
PLATELET # BLD AUTO: 179 10*3/MM3 (ref 150–450)
PLATELET # BLD AUTO: 211 10*3/MM3 (ref 150–450)
PLATELET # BLD AUTO: 219 10*3/MM3 (ref 150–450)
PLATELET # BLD AUTO: 239 10*3/MM3 (ref 150–450)
PMV BLD AUTO: 10.9 FL (ref 8–12)
PMV BLD AUTO: 11.1 FL (ref 8–12)
PMV BLD AUTO: 11.1 FL (ref 8–12)
PMV BLD AUTO: 11.2 FL (ref 8–12)
PMV BLD AUTO: 11.2 FL (ref 8–12)
PO2 BLDA: 53.7 MM HG (ref 83–108)
PO2 BLDA: 56.4 MM HG (ref 83–108)
PO2 BLDA: 57.6 MM HG (ref 83–108)
POTASSIUM BLD-SCNC: 3.6 MMOL/L (ref 3.5–5.1)
POTASSIUM BLD-SCNC: 3.8 MMOL/L (ref 3.5–5.1)
POTASSIUM BLD-SCNC: 3.8 MMOL/L (ref 3.5–5.1)
POTASSIUM BLD-SCNC: 3.9 MMOL/L (ref 3.5–5.1)
POTASSIUM BLD-SCNC: 4.1 MMOL/L (ref 3.5–5.1)
POTASSIUM BLD-SCNC: 4.6 MMOL/L (ref 3.5–5.1)
PROT SERPL-MCNC: 7.4 G/DL (ref 6.3–8.6)
PROT UR QL STRIP: ABNORMAL
PROT UR STRIP-MCNC: ABNORMAL MG/DL
RBC # BLD AUTO: 3.39 10*6/MM3 (ref 4.37–5.74)
RBC # BLD AUTO: 3.62 10*6/MM3 (ref 4.37–5.74)
RBC # BLD AUTO: 3.75 10*6/MM3 (ref 4.37–5.74)
RBC # BLD AUTO: 3.93 10*6/MM3 (ref 4.37–5.74)
RBC # BLD AUTO: 3.98 10*6/MM3 (ref 4.37–5.74)
RBC # UR STRIP: NEGATIVE /UL
RBC # UR: ABNORMAL /HPF
REF LAB TEST METHOD: ABNORMAL
RHINOVIRUS RNA SPEC NAA+PROBE: NOT DETECTED
RSV RNA NPH QL NAA+NON-PROBE: NOT DETECTED
S PNEUM AG SPEC QL LA: NEGATIVE
SAO2 % BLDCOA: 90.5 % (ref 94–99)
SAO2 % BLDCOA: 92.2 % (ref 94–99)
SAO2 % BLDCOA: 92.6 % (ref 94–99)
SODIUM BLD-SCNC: 136 MMOL/L (ref 137–145)
SODIUM BLD-SCNC: 138 MMOL/L (ref 137–145)
SODIUM BLD-SCNC: 138 MMOL/L (ref 137–145)
SODIUM BLD-SCNC: 140 MMOL/L (ref 137–145)
SODIUM BLD-SCNC: 141 MMOL/L (ref 137–145)
SODIUM BLD-SCNC: 141 MMOL/L (ref 137–145)
SP GR UR STRIP: 1.03 (ref 1–1.03)
SP GR UR: 1.02 (ref 1–1.03)
SQUAMOUS #/AREA URNS HPF: ABNORMAL /HPF
STAT OF ADQ CVX/VAG CYTO-IMP: NORMAL
TROPONIN I SERPL-MCNC: <0.012 NG/ML
UROBILINOGEN UR QL STRIP: ABNORMAL
UROBILINOGEN UR QL: NORMAL
VENTILATOR MODE: ABNORMAL
WBC NRBC COR # BLD: 13.06 10*3/MM3 (ref 3.2–9.8)
WBC NRBC COR # BLD: 13.57 10*3/MM3 (ref 3.2–9.8)
WBC NRBC COR # BLD: 18.52 10*3/MM3 (ref 3.2–9.8)
WBC NRBC COR # BLD: 20.22 10*3/MM3 (ref 3.2–9.8)
WBC NRBC COR # BLD: 20.34 10*3/MM3 (ref 3.2–9.8)
WBC UR QL AUTO: ABNORMAL /HPF
WHOLE BLOOD HOLD SPECIMEN: NORMAL

## 2018-01-01 PROCEDURE — 80048 BASIC METABOLIC PNL TOTAL CA: CPT | Performed by: FAMILY MEDICINE

## 2018-01-01 PROCEDURE — 94799 UNLISTED PULMONARY SVC/PX: CPT

## 2018-01-01 PROCEDURE — 25010000002 FUROSEMIDE PER 20 MG: Performed by: INTERNAL MEDICINE

## 2018-01-01 PROCEDURE — 80048 BASIC METABOLIC PNL TOTAL CA: CPT | Performed by: NURSE PRACTITIONER

## 2018-01-01 PROCEDURE — 96372 THER/PROPH/DIAG INJ SC/IM: CPT | Performed by: INTERNAL MEDICINE

## 2018-01-01 PROCEDURE — 99232 SBSQ HOSP IP/OBS MODERATE 35: CPT | Performed by: INTERNAL MEDICINE

## 2018-01-01 PROCEDURE — 81003 URINALYSIS AUTO W/O SCOPE: CPT | Performed by: EMERGENCY MEDICINE

## 2018-01-01 PROCEDURE — 87486 CHLMYD PNEUM DNA AMP PROBE: CPT | Performed by: NURSE PRACTITIONER

## 2018-01-01 PROCEDURE — 82550 ASSAY OF CK (CPK): CPT | Performed by: EMERGENCY MEDICINE

## 2018-01-01 PROCEDURE — 97162 PT EVAL MOD COMPLEX 30 MIN: CPT

## 2018-01-01 PROCEDURE — 80053 COMPREHEN METABOLIC PANEL: CPT | Performed by: EMERGENCY MEDICINE

## 2018-01-01 PROCEDURE — 25010000002 METHYLPREDNISOLONE PER 125 MG: Performed by: FAMILY MEDICINE

## 2018-01-01 PROCEDURE — 94760 N-INVAS EAR/PLS OXIMETRY 1: CPT

## 2018-01-01 PROCEDURE — 0 FLUDEOXYGLUCOSE F18 SOLUTION: Performed by: INTERNAL MEDICINE

## 2018-01-01 PROCEDURE — 99213 OFFICE O/P EST LOW 20 MIN: CPT | Performed by: INTERNAL MEDICINE

## 2018-01-01 PROCEDURE — 93970 EXTREMITY STUDY: CPT

## 2018-01-01 PROCEDURE — 99214 OFFICE O/P EST MOD 30 MIN: CPT | Performed by: INTERNAL MEDICINE

## 2018-01-01 PROCEDURE — 88161 CYTOPATH SMEAR OTHER SOURCE: CPT | Performed by: PATHOLOGY

## 2018-01-01 PROCEDURE — 99214 OFFICE O/P EST MOD 30 MIN: CPT | Performed by: FAMILY MEDICINE

## 2018-01-01 PROCEDURE — 81002 URINALYSIS NONAUTO W/O SCOPE: CPT | Performed by: FAMILY MEDICINE

## 2018-01-01 PROCEDURE — 78815 PET IMAGE W/CT SKULL-THIGH: CPT

## 2018-01-01 PROCEDURE — 87581 M.PNEUMON DNA AMP PROBE: CPT | Performed by: NURSE PRACTITIONER

## 2018-01-01 PROCEDURE — 87899 AGENT NOS ASSAY W/OPTIC: CPT | Performed by: NURSE PRACTITIONER

## 2018-01-01 PROCEDURE — 25010000002 CEFTRIAXONE PER 250 MG: Performed by: NURSE PRACTITIONER

## 2018-01-01 PROCEDURE — 94640 AIRWAY INHALATION TREATMENT: CPT

## 2018-01-01 PROCEDURE — G8979 MOBILITY GOAL STATUS: HCPCS

## 2018-01-01 PROCEDURE — 25010000002 LEVOFLOXACIN PER 250 MG: Performed by: INTERNAL MEDICINE

## 2018-01-01 PROCEDURE — 97116 GAIT TRAINING THERAPY: CPT

## 2018-01-01 PROCEDURE — 71275 CT ANGIOGRAPHY CHEST: CPT

## 2018-01-01 PROCEDURE — 99285 EMERGENCY DEPT VISIT HI MDM: CPT

## 2018-01-01 PROCEDURE — 71046 X-RAY EXAM CHEST 2 VIEWS: CPT

## 2018-01-01 PROCEDURE — 83605 ASSAY OF LACTIC ACID: CPT | Performed by: EMERGENCY MEDICINE

## 2018-01-01 PROCEDURE — 71045 X-RAY EXAM CHEST 1 VIEW: CPT

## 2018-01-01 PROCEDURE — 99213 OFFICE O/P EST LOW 20 MIN: CPT | Performed by: FAMILY MEDICINE

## 2018-01-01 PROCEDURE — 63710000001 METHYLPREDNISOLONE PER 4 MG: Performed by: FAMILY MEDICINE

## 2018-01-01 PROCEDURE — 85025 COMPLETE CBC W/AUTO DIFF WBC: CPT | Performed by: NURSE PRACTITIONER

## 2018-01-01 PROCEDURE — 36600 WITHDRAWAL OF ARTERIAL BLOOD: CPT

## 2018-01-01 PROCEDURE — 25010000002 METHYLPREDNISOLONE PER 40 MG: Performed by: INTERNAL MEDICINE

## 2018-01-01 PROCEDURE — 83735 ASSAY OF MAGNESIUM: CPT | Performed by: FAMILY MEDICINE

## 2018-01-01 PROCEDURE — 87798 DETECT AGENT NOS DNA AMP: CPT | Performed by: NURSE PRACTITIONER

## 2018-01-01 PROCEDURE — 0 IOPAMIDOL PER 1 ML: Performed by: INTERNAL MEDICINE

## 2018-01-01 PROCEDURE — 87205 SMEAR GRAM STAIN: CPT | Performed by: NURSE PRACTITIONER

## 2018-01-01 PROCEDURE — 82803 BLOOD GASES ANY COMBINATION: CPT

## 2018-01-01 PROCEDURE — 25010000002 METHYLPREDNISOLONE PER 125 MG: Performed by: INTERNAL MEDICINE

## 2018-01-01 PROCEDURE — 87070 CULTURE OTHR SPECIMN AEROBIC: CPT | Performed by: NURSE PRACTITIONER

## 2018-01-01 PROCEDURE — 93010 ELECTROCARDIOGRAM REPORT: CPT | Performed by: INTERNAL MEDICINE

## 2018-01-01 PROCEDURE — 97530 THERAPEUTIC ACTIVITIES: CPT

## 2018-01-01 PROCEDURE — 81015 MICROSCOPIC EXAM OF URINE: CPT | Performed by: EMERGENCY MEDICINE

## 2018-01-01 PROCEDURE — 87205 SMEAR GRAM STAIN: CPT

## 2018-01-01 PROCEDURE — 85379 FIBRIN DEGRADATION QUANT: CPT | Performed by: FAMILY MEDICINE

## 2018-01-01 PROCEDURE — 87040 BLOOD CULTURE FOR BACTERIA: CPT | Performed by: EMERGENCY MEDICINE

## 2018-01-01 PROCEDURE — 85025 COMPLETE CBC W/AUTO DIFF WBC: CPT | Performed by: EMERGENCY MEDICINE

## 2018-01-01 PROCEDURE — 83690 ASSAY OF LIPASE: CPT | Performed by: EMERGENCY MEDICINE

## 2018-01-01 PROCEDURE — A9552 F18 FDG: HCPCS | Performed by: INTERNAL MEDICINE

## 2018-01-01 PROCEDURE — 93005 ELECTROCARDIOGRAM TRACING: CPT | Performed by: EMERGENCY MEDICINE

## 2018-01-01 PROCEDURE — 96372 THER/PROPH/DIAG INJ SC/IM: CPT | Performed by: FAMILY MEDICINE

## 2018-01-01 PROCEDURE — 87633 RESP VIRUS 12-25 TARGETS: CPT | Performed by: NURSE PRACTITIONER

## 2018-01-01 PROCEDURE — 84484 ASSAY OF TROPONIN QUANT: CPT | Performed by: EMERGENCY MEDICINE

## 2018-01-01 PROCEDURE — 25010000002 METHYLPREDNISOLONE PER 40 MG: Performed by: FAMILY MEDICINE

## 2018-01-01 PROCEDURE — G8978 MOBILITY CURRENT STATUS: HCPCS

## 2018-01-01 PROCEDURE — 82553 CREATINE MB FRACTION: CPT | Performed by: EMERGENCY MEDICINE

## 2018-01-01 PROCEDURE — 25010000002 LEVOFLOXACIN PER 250 MG: Performed by: EMERGENCY MEDICINE

## 2018-01-01 RX ORDER — LEVOFLOXACIN 500 MG/1
500 TABLET, FILM COATED ORAL EVERY 24 HOURS
Status: DISCONTINUED | OUTPATIENT
Start: 2018-01-01 | End: 2018-01-01 | Stop reason: HOSPADM

## 2018-01-01 RX ORDER — TRAMADOL HYDROCHLORIDE 50 MG/1
50 TABLET ORAL EVERY 6 HOURS PRN
Qty: 120 TABLET | Refills: 2 | Status: SHIPPED | OUTPATIENT
Start: 2018-01-01 | End: 2019-01-01 | Stop reason: SDUPTHER

## 2018-01-01 RX ORDER — HYDROXYZINE PAMOATE 25 MG/1
25 CAPSULE ORAL 3 TIMES DAILY PRN
Status: DISCONTINUED | OUTPATIENT
Start: 2018-01-01 | End: 2018-01-01 | Stop reason: HOSPADM

## 2018-01-01 RX ORDER — METHYLPREDNISOLONE 4 MG/1
4 TABLET ORAL 2 TIMES DAILY
Status: DISCONTINUED | OUTPATIENT
Start: 2018-01-01 | End: 2018-01-01 | Stop reason: HOSPADM

## 2018-01-01 RX ORDER — TRAZODONE HYDROCHLORIDE 50 MG/1
TABLET ORAL
COMMUNITY
Start: 2018-01-01 | End: 2018-01-01 | Stop reason: SDUPTHER

## 2018-01-01 RX ORDER — LEVOFLOXACIN 5 MG/ML
750 INJECTION, SOLUTION INTRAVENOUS ONCE
Status: COMPLETED | OUTPATIENT
Start: 2018-01-01 | End: 2018-01-01

## 2018-01-01 RX ORDER — FAMOTIDINE 40 MG/1
40 TABLET, FILM COATED ORAL DAILY
Qty: 30 TABLET | Refills: 11 | Status: SHIPPED | OUTPATIENT
Start: 2018-01-01 | End: 2018-01-01 | Stop reason: SDUPTHER

## 2018-01-01 RX ORDER — PREDNISONE 10 MG/1
10 TABLET ORAL DAILY
Qty: 30 TABLET | Refills: 0 | Status: SHIPPED | OUTPATIENT
Start: 2018-01-01 | End: 2018-01-01

## 2018-01-01 RX ORDER — METHYLPREDNISOLONE ACETATE 40 MG/ML
80 INJECTION, SUSPENSION INTRA-ARTICULAR; INTRALESIONAL; INTRAMUSCULAR; SOFT TISSUE ONCE
Status: COMPLETED | OUTPATIENT
Start: 2018-01-01 | End: 2018-01-01

## 2018-01-01 RX ORDER — FUROSEMIDE 10 MG/ML
20 INJECTION INTRAMUSCULAR; INTRAVENOUS EVERY 12 HOURS
Status: DISCONTINUED | OUTPATIENT
Start: 2018-01-01 | End: 2018-01-01 | Stop reason: HOSPADM

## 2018-01-01 RX ORDER — METHYLPREDNISOLONE 4 MG/1
4 TABLET ORAL
Status: DISCONTINUED | OUTPATIENT
Start: 2018-01-01 | End: 2018-01-01 | Stop reason: HOSPADM

## 2018-01-01 RX ORDER — ACETAMINOPHEN 325 MG/1
650 TABLET ORAL EVERY 4 HOURS PRN
Qty: 30 TABLET | Refills: 0 | Status: SHIPPED | OUTPATIENT
Start: 2018-01-01

## 2018-01-01 RX ORDER — CYPROHEPTADINE HYDROCHLORIDE 4 MG/1
4 TABLET ORAL 3 TIMES DAILY PRN
Qty: 40 TABLET | Refills: 1 | Status: SHIPPED | OUTPATIENT
Start: 2018-01-01 | End: 2019-01-01 | Stop reason: SDUPTHER

## 2018-01-01 RX ORDER — FUROSEMIDE 20 MG/1
20 TABLET ORAL DAILY
Qty: 20 TABLET | Refills: 0 | Status: SHIPPED | OUTPATIENT
Start: 2018-01-01 | End: 2019-01-01 | Stop reason: DRUGHIGH

## 2018-01-01 RX ORDER — LEVOFLOXACIN 5 MG/ML
500 INJECTION, SOLUTION INTRAVENOUS EVERY 24 HOURS
Status: DISCONTINUED | OUTPATIENT
Start: 2018-01-01 | End: 2018-01-01 | Stop reason: CLARIF

## 2018-01-01 RX ORDER — CETIRIZINE HYDROCHLORIDE 10 MG/1
10 TABLET ORAL DAILY
Status: DISCONTINUED | OUTPATIENT
Start: 2018-01-01 | End: 2018-01-01 | Stop reason: HOSPADM

## 2018-01-01 RX ORDER — METHYLPREDNISOLONE SODIUM SUCCINATE 40 MG/ML
40 INJECTION, POWDER, LYOPHILIZED, FOR SOLUTION INTRAMUSCULAR; INTRAVENOUS EVERY 6 HOURS
Status: DISCONTINUED | OUTPATIENT
Start: 2018-01-01 | End: 2018-01-01

## 2018-01-01 RX ORDER — SODIUM CHLORIDE 0.9 % (FLUSH) 0.9 %
10 SYRINGE (ML) INJECTION AS NEEDED
Status: DISCONTINUED | OUTPATIENT
Start: 2018-01-01 | End: 2018-01-01 | Stop reason: HOSPADM

## 2018-01-01 RX ORDER — OXYCODONE HYDROCHLORIDE AND ACETAMINOPHEN 5; 325 MG/1; MG/1
1 TABLET ORAL EVERY 4 HOURS PRN
Qty: 120 TABLET | Refills: 0 | Status: SHIPPED | OUTPATIENT
Start: 2018-01-01

## 2018-01-01 RX ORDER — ACETAMINOPHEN 325 MG/1
650 TABLET ORAL EVERY 4 HOURS PRN
Status: DISCONTINUED | OUTPATIENT
Start: 2018-01-01 | End: 2018-01-01 | Stop reason: HOSPADM

## 2018-01-01 RX ORDER — CLONAZEPAM 0.5 MG/1
.25-.5 TABLET ORAL 2 TIMES DAILY PRN
Qty: 60 TABLET | Refills: 0 | Status: SHIPPED | OUTPATIENT
Start: 2018-01-01 | End: 2018-01-01 | Stop reason: SINTOL

## 2018-01-01 RX ORDER — SODIUM CHLORIDE 0.9 % (FLUSH) 0.9 %
1-10 SYRINGE (ML) INJECTION AS NEEDED
Status: DISCONTINUED | OUTPATIENT
Start: 2018-01-01 | End: 2018-01-01 | Stop reason: HOSPADM

## 2018-01-01 RX ORDER — LEVOFLOXACIN 500 MG/1
500 TABLET, FILM COATED ORAL EVERY 24 HOURS
Qty: 5 TABLET | Refills: 0 | Status: SHIPPED | OUTPATIENT
Start: 2018-01-01 | End: 2018-01-01

## 2018-01-01 RX ORDER — LORAZEPAM 0.5 MG/1
0.5 TABLET ORAL EVERY 6 HOURS PRN
Qty: 120 TABLET | Refills: 0 | Status: SHIPPED | OUTPATIENT
Start: 2018-01-01 | End: 2018-01-01 | Stop reason: SINTOL

## 2018-01-01 RX ORDER — AMOXICILLIN 500 MG/1
CAPSULE ORAL
Qty: 30 CAPSULE | Refills: 0 | Status: SHIPPED | OUTPATIENT
Start: 2018-01-01 | End: 2018-01-01

## 2018-01-01 RX ORDER — ALBUTEROL SULFATE 2.5 MG/3ML
2.5 SOLUTION RESPIRATORY (INHALATION) ONCE AS NEEDED
Status: DISCONTINUED | OUTPATIENT
Start: 2018-01-01 | End: 2018-01-01 | Stop reason: HOSPADM

## 2018-01-01 RX ORDER — DOXYCYCLINE 100 MG/1
CAPSULE ORAL
Qty: 20 CAPSULE | Refills: 0 | Status: SHIPPED | OUTPATIENT
Start: 2018-01-01 | End: 2019-01-01

## 2018-01-01 RX ORDER — MIRTAZAPINE 15 MG/1
15 TABLET, FILM COATED ORAL NIGHTLY
Status: DISCONTINUED | OUTPATIENT
Start: 2018-01-01 | End: 2018-01-01 | Stop reason: HOSPADM

## 2018-01-01 RX ORDER — QUETIAPINE FUMARATE 25 MG/1
25 TABLET, FILM COATED ORAL 2 TIMES DAILY
Qty: 60 TABLET | Refills: 0 | Status: SHIPPED | OUTPATIENT
Start: 2018-01-01 | End: 2018-01-01 | Stop reason: SINTOL

## 2018-01-01 RX ORDER — ONDANSETRON 2 MG/ML
4 INJECTION INTRAMUSCULAR; INTRAVENOUS EVERY 6 HOURS PRN
Status: DISCONTINUED | OUTPATIENT
Start: 2018-01-01 | End: 2018-01-01 | Stop reason: HOSPADM

## 2018-01-01 RX ORDER — METHYLPREDNISOLONE SODIUM SUCCINATE 125 MG/2ML
80 INJECTION, POWDER, LYOPHILIZED, FOR SOLUTION INTRAMUSCULAR; INTRAVENOUS ONCE
Status: COMPLETED | OUTPATIENT
Start: 2018-01-01 | End: 2018-01-01

## 2018-01-01 RX ORDER — HYDROCODONE BITARTRATE AND ACETAMINOPHEN 5; 325 MG/1; MG/1
1 TABLET ORAL EVERY 6 HOURS PRN
Qty: 100 TABLET | Refills: 0 | Status: SHIPPED | OUTPATIENT
Start: 2018-01-01 | End: 2018-01-01 | Stop reason: SINTOL

## 2018-01-01 RX ORDER — METHYLPREDNISOLONE 16 MG/1
8 TABLET ORAL
Status: COMPLETED | OUTPATIENT
Start: 2018-01-01 | End: 2018-01-01

## 2018-01-01 RX ORDER — TRIAMCINOLONE ACETONIDE 40 MG/ML
80 INJECTION, SUSPENSION INTRA-ARTICULAR; INTRAMUSCULAR ONCE
Status: COMPLETED | OUTPATIENT
Start: 2018-01-01 | End: 2018-01-01

## 2018-01-01 RX ORDER — MEGESTROL ACETATE 40 MG/ML
400 SUSPENSION ORAL DAILY
Qty: 300 ML | Refills: 2 | Status: SHIPPED | OUTPATIENT
Start: 2018-01-01 | End: 2019-01-01 | Stop reason: SDUPTHER

## 2018-01-01 RX ORDER — IPRATROPIUM BROMIDE AND ALBUTEROL SULFATE 2.5; .5 MG/3ML; MG/3ML
3 SOLUTION RESPIRATORY (INHALATION) EVERY 4 HOURS PRN
Qty: 360 ML | Refills: 0 | Status: SHIPPED | OUTPATIENT
Start: 2018-01-01 | End: 2019-01-01

## 2018-01-01 RX ORDER — TRAZODONE HYDROCHLORIDE 50 MG/1
50-150 TABLET ORAL NIGHTLY
Qty: 90 TABLET | Refills: 11 | Status: SHIPPED | OUTPATIENT
Start: 2018-01-01

## 2018-01-01 RX ORDER — METHYLPREDNISOLONE 4 MG/1
8 TABLET ORAL
Status: DISCONTINUED | OUTPATIENT
Start: 2018-01-01 | End: 2018-01-01 | Stop reason: HOSPADM

## 2018-01-01 RX ORDER — PREDNISONE 20 MG/1
20 TABLET ORAL DAILY
Qty: 30 TABLET | Refills: 0 | Status: SHIPPED | OUTPATIENT
Start: 2018-01-01 | End: 2019-01-01

## 2018-01-01 RX ORDER — ALPRAZOLAM 0.25 MG/1
0.25 TABLET ORAL 3 TIMES DAILY PRN
Qty: 5 TABLET | Refills: 0 | Status: SHIPPED | OUTPATIENT
Start: 2018-01-01 | End: 2018-01-01

## 2018-01-01 RX ORDER — PREDNISONE 10 MG/1
TABLET ORAL
Qty: 1 EACH | Refills: 0 | Status: SHIPPED | OUTPATIENT
Start: 2018-01-01 | End: 2019-01-01

## 2018-01-01 RX ORDER — TRAZODONE HYDROCHLORIDE 50 MG/1
50 TABLET ORAL NIGHTLY
Qty: 30 TABLET | Refills: 5 | Status: SHIPPED | OUTPATIENT
Start: 2018-01-01 | End: 2018-01-01 | Stop reason: SDUPTHER

## 2018-01-01 RX ORDER — FUROSEMIDE 40 MG/1
40 TABLET ORAL DAILY
Qty: 30 TABLET | Refills: 2 | Status: SHIPPED | OUTPATIENT
Start: 2018-01-01

## 2018-01-01 RX ORDER — OXYCODONE HYDROCHLORIDE AND ACETAMINOPHEN 5; 325 MG/1; MG/1
1 TABLET ORAL EVERY 4 HOURS PRN
Qty: 120 TABLET | Refills: 0 | Status: SHIPPED | OUTPATIENT
Start: 2018-01-01 | End: 2018-01-01 | Stop reason: SDUPTHER

## 2018-01-01 RX ORDER — FAMOTIDINE 40 MG/1
40 TABLET, FILM COATED ORAL DAILY
Qty: 30 TABLET | Refills: 11 | Status: SHIPPED | OUTPATIENT
Start: 2018-01-01

## 2018-01-01 RX ORDER — LORAZEPAM 0.5 MG/1
0.5 TABLET ORAL EVERY 6 HOURS PRN
Qty: 120 TABLET | Refills: 0 | Status: SHIPPED | OUTPATIENT
Start: 2018-01-01 | End: 2018-01-01 | Stop reason: SDUPTHER

## 2018-01-01 RX ORDER — FAMOTIDINE 40 MG/1
40 TABLET, FILM COATED ORAL DAILY
Qty: 30 TABLET | Refills: 0 | Status: SHIPPED | OUTPATIENT
Start: 2018-01-01 | End: 2018-01-01 | Stop reason: SDUPTHER

## 2018-01-01 RX ORDER — CEFUROXIME AXETIL 500 MG/1
TABLET ORAL
Qty: 20 TABLET | Refills: 0 | Status: SHIPPED | OUTPATIENT
Start: 2018-01-01 | End: 2018-01-01

## 2018-01-01 RX ORDER — FLUTICASONE PROPIONATE 50 MCG
2 SPRAY, SUSPENSION (ML) NASAL DAILY
Status: DISCONTINUED | OUTPATIENT
Start: 2018-01-01 | End: 2018-01-01 | Stop reason: HOSPADM

## 2018-01-01 RX ORDER — ACETAMINOPHEN 325 MG/1
650 TABLET ORAL ONCE
Status: COMPLETED | OUTPATIENT
Start: 2018-01-01 | End: 2018-01-01

## 2018-01-01 RX ORDER — SODIUM CHLORIDE 9 MG/ML
125 INJECTION, SOLUTION INTRAVENOUS CONTINUOUS
Status: DISCONTINUED | OUTPATIENT
Start: 2018-01-01 | End: 2018-01-01

## 2018-01-01 RX ORDER — MODAFINIL 200 MG/1
200 TABLET ORAL DAILY
Status: DISCONTINUED | OUTPATIENT
Start: 2018-01-01 | End: 2018-01-01 | Stop reason: HOSPADM

## 2018-01-01 RX ORDER — TRAZODONE HYDROCHLORIDE 50 MG/1
50 TABLET ORAL NIGHTLY PRN
Status: DISCONTINUED | OUTPATIENT
Start: 2018-01-01 | End: 2018-01-01 | Stop reason: HOSPADM

## 2018-01-01 RX ORDER — METHYLPREDNISOLONE 4 MG/1
4 TABLET ORAL
Status: COMPLETED | OUTPATIENT
Start: 2018-01-01 | End: 2018-01-01

## 2018-01-01 RX ORDER — DOXYCYCLINE 100 MG/1
CAPSULE ORAL
Qty: 20 CAPSULE | Refills: 1 | Status: SHIPPED | OUTPATIENT
Start: 2018-01-01 | End: 2018-01-01

## 2018-01-01 RX ORDER — FUROSEMIDE 10 MG/ML
INJECTION INTRAMUSCULAR; INTRAVENOUS
Status: DISPENSED
Start: 2018-01-01 | End: 2018-01-01

## 2018-01-01 RX ORDER — FAMOTIDINE 40 MG/1
40 TABLET, FILM COATED ORAL DAILY
Status: DISCONTINUED | OUTPATIENT
Start: 2018-01-01 | End: 2018-01-01 | Stop reason: HOSPADM

## 2018-01-01 RX ORDER — CLONAZEPAM 0.5 MG/1
.25-.5 TABLET ORAL 2 TIMES DAILY PRN
Qty: 60 TABLET | Refills: 0 | Status: SHIPPED | OUTPATIENT
Start: 2018-01-01 | End: 2018-01-01 | Stop reason: SDUPTHER

## 2018-01-01 RX ORDER — IPRATROPIUM BROMIDE AND ALBUTEROL SULFATE 2.5; .5 MG/3ML; MG/3ML
3 SOLUTION RESPIRATORY (INHALATION)
Status: DISCONTINUED | OUTPATIENT
Start: 2018-01-01 | End: 2018-01-01 | Stop reason: HOSPADM

## 2018-01-01 RX ORDER — BUSPIRONE HYDROCHLORIDE 5 MG/1
5 TABLET ORAL 3 TIMES DAILY
Qty: 90 TABLET | Refills: 5 | Status: SHIPPED | OUTPATIENT
Start: 2018-01-01 | End: 2019-01-01 | Stop reason: SDUPTHER

## 2018-01-01 RX ORDER — MODAFINIL 200 MG/1
TABLET ORAL
Qty: 30 TABLET | Refills: 5 | Status: SHIPPED | OUTPATIENT
Start: 2018-01-01

## 2018-01-01 RX ORDER — HYDROXYZINE HYDROCHLORIDE 25 MG/1
25 TABLET, FILM COATED ORAL 3 TIMES DAILY PRN
Qty: 50 TABLET | Refills: 1 | Status: SHIPPED | OUTPATIENT
Start: 2018-01-01 | End: 2019-01-01 | Stop reason: SDUPTHER

## 2018-01-01 RX ORDER — PREDNISONE 10 MG/1
10 TABLET ORAL DAILY
Qty: 30 TABLET | Refills: 0 | Status: SHIPPED | OUTPATIENT
Start: 2018-01-01 | End: 2019-01-01

## 2018-01-01 RX ORDER — FUROSEMIDE 10 MG/ML
40 INJECTION INTRAMUSCULAR; INTRAVENOUS ONCE
Status: COMPLETED | OUTPATIENT
Start: 2018-01-01 | End: 2018-01-01

## 2018-01-01 RX ORDER — TRAMADOL HYDROCHLORIDE 50 MG/1
50 TABLET ORAL EVERY 6 HOURS PRN
Status: DISCONTINUED | OUTPATIENT
Start: 2018-01-01 | End: 2018-01-01 | Stop reason: HOSPADM

## 2018-01-01 RX ORDER — METHYLPREDNISOLONE SODIUM SUCCINATE 125 MG/2ML
60 INJECTION, POWDER, LYOPHILIZED, FOR SOLUTION INTRAMUSCULAR; INTRAVENOUS EVERY 6 HOURS
Status: DISCONTINUED | OUTPATIENT
Start: 2018-01-01 | End: 2018-01-01

## 2018-01-01 RX ORDER — IPRATROPIUM BROMIDE AND ALBUTEROL SULFATE 2.5; .5 MG/3ML; MG/3ML
3 SOLUTION RESPIRATORY (INHALATION) EVERY 4 HOURS PRN
Status: DISCONTINUED | OUTPATIENT
Start: 2018-01-01 | End: 2018-01-01 | Stop reason: HOSPADM

## 2018-01-01 RX ADMIN — SODIUM CHLORIDE 125 ML/HR: 9 INJECTION, SOLUTION INTRAVENOUS at 02:27

## 2018-01-01 RX ADMIN — MIRTAZAPINE 15 MG: 15 TABLET, FILM COATED ORAL at 21:50

## 2018-01-01 RX ADMIN — FUROSEMIDE 40 MG: 10 INJECTION, SOLUTION INTRAMUSCULAR; INTRAVENOUS at 20:51

## 2018-01-01 RX ADMIN — FUROSEMIDE 20 MG: 10 INJECTION, SOLUTION INTRAMUSCULAR; INTRAVENOUS at 06:26

## 2018-01-01 RX ADMIN — SODIUM CHLORIDE 125 ML/HR: 9 INJECTION, SOLUTION INTRAVENOUS at 16:32

## 2018-01-01 RX ADMIN — CETIRIZINE HYDROCHLORIDE 10 MG: 10 TABLET, FILM COATED ORAL at 08:09

## 2018-01-01 RX ADMIN — TRIAMCINOLONE ACETONIDE 80 MG: 40 INJECTION, SUSPENSION INTRA-ARTICULAR; INTRAMUSCULAR at 14:42

## 2018-01-01 RX ADMIN — FUROSEMIDE 20 MG: 10 INJECTION, SOLUTION INTRAMUSCULAR; INTRAVENOUS at 06:23

## 2018-01-01 RX ADMIN — METHYLPREDNISOLONE 8 MG: 4 TABLET ORAL at 12:15

## 2018-01-01 RX ADMIN — METHYLPREDNISOLONE SODIUM SUCCINATE 40 MG: 40 INJECTION, POWDER, FOR SOLUTION INTRAMUSCULAR; INTRAVENOUS at 02:50

## 2018-01-01 RX ADMIN — Medication 10 ML: at 02:51

## 2018-01-01 RX ADMIN — IPRATROPIUM BROMIDE AND ALBUTEROL SULFATE 3 ML: 2.5; .5 SOLUTION RESPIRATORY (INHALATION) at 12:20

## 2018-01-01 RX ADMIN — IPRATROPIUM BROMIDE AND ALBUTEROL SULFATE 3 ML: 2.5; .5 SOLUTION RESPIRATORY (INHALATION) at 13:50

## 2018-01-01 RX ADMIN — IPRATROPIUM BROMIDE AND ALBUTEROL SULFATE 3 ML: 2.5; .5 SOLUTION RESPIRATORY (INHALATION) at 07:59

## 2018-01-01 RX ADMIN — METHYLPREDNISOLONE ACETATE 80 MG: 40 INJECTION, SUSPENSION INTRA-ARTICULAR; INTRALESIONAL; INTRAMUSCULAR; SOFT TISSUE at 15:15

## 2018-01-01 RX ADMIN — FUROSEMIDE 20 MG: 10 INJECTION, SOLUTION INTRAMUSCULAR; INTRAVENOUS at 18:11

## 2018-01-01 RX ADMIN — MODAFINIL 200 MG: 200 TABLET ORAL at 09:00

## 2018-01-01 RX ADMIN — MODAFINIL 200 MG: 200 TABLET ORAL at 09:13

## 2018-01-01 RX ADMIN — TRAZODONE HYDROCHLORIDE 50 MG: 50 TABLET ORAL at 23:29

## 2018-01-01 RX ADMIN — MIRTAZAPINE 15 MG: 15 TABLET, FILM COATED ORAL at 22:00

## 2018-01-01 RX ADMIN — DOXYCYCLINE 100 MG: 100 INJECTION, POWDER, LYOPHILIZED, FOR SOLUTION INTRAVENOUS at 09:13

## 2018-01-01 RX ADMIN — METHYLPREDNISOLONE SODIUM SUCCINATE 60 MG: 125 INJECTION, POWDER, FOR SOLUTION INTRAMUSCULAR; INTRAVENOUS at 09:41

## 2018-01-01 RX ADMIN — FLUDEOXYGLUCOSE F18 1 DOSE: 300 INJECTION INTRAVENOUS at 09:59

## 2018-01-01 RX ADMIN — SODIUM CHLORIDE 125 ML/HR: 9 INJECTION, SOLUTION INTRAVENOUS at 20:47

## 2018-01-01 RX ADMIN — LEVOFLOXACIN 500 MG: 5 INJECTION, SOLUTION INTRAVENOUS at 17:04

## 2018-01-01 RX ADMIN — METHYLPREDNISOLONE SODIUM SUCCINATE 80 MG: 125 INJECTION, POWDER, FOR SOLUTION INTRAMUSCULAR; INTRAVENOUS at 20:56

## 2018-01-01 RX ADMIN — FUROSEMIDE 20 MG: 10 INJECTION, SOLUTION INTRAMUSCULAR; INTRAVENOUS at 18:06

## 2018-01-01 RX ADMIN — METHYLPREDNISOLONE 4 MG: 4 TABLET ORAL at 14:10

## 2018-01-01 RX ADMIN — FAMOTIDINE 40 MG: 40 TABLET ORAL at 09:13

## 2018-01-01 RX ADMIN — IOPAMIDOL 59 ML: 755 INJECTION, SOLUTION INTRAVENOUS at 12:38

## 2018-01-01 RX ADMIN — MIRTAZAPINE 15 MG: 15 TABLET, FILM COATED ORAL at 22:04

## 2018-01-01 RX ADMIN — CETIRIZINE HYDROCHLORIDE 10 MG: 10 TABLET, FILM COATED ORAL at 09:40

## 2018-01-01 RX ADMIN — FLUTICASONE PROPIONATE 2 SPRAY: 50 SPRAY, METERED NASAL at 09:41

## 2018-01-01 RX ADMIN — IPRATROPIUM BROMIDE AND ALBUTEROL SULFATE 3 ML: 2.5; .5 SOLUTION RESPIRATORY (INHALATION) at 06:35

## 2018-01-01 RX ADMIN — METHYLPREDNISOLONE SODIUM SUCCINATE 40 MG: 40 INJECTION, POWDER, FOR SOLUTION INTRAMUSCULAR; INTRAVENOUS at 08:09

## 2018-01-01 RX ADMIN — ACETAMINOPHEN 650 MG: 325 TABLET ORAL at 14:44

## 2018-01-01 RX ADMIN — METHYLPREDNISOLONE SODIUM SUCCINATE 60 MG: 125 INJECTION, POWDER, FOR SOLUTION INTRAMUSCULAR; INTRAVENOUS at 15:10

## 2018-01-01 RX ADMIN — FAMOTIDINE 40 MG: 40 TABLET ORAL at 08:09

## 2018-01-01 RX ADMIN — LEVOFLOXACIN 500 MG: 5 INJECTION, SOLUTION INTRAVENOUS at 17:42

## 2018-01-01 RX ADMIN — FAMOTIDINE 40 MG: 40 TABLET ORAL at 08:19

## 2018-01-01 RX ADMIN — DOXYCYCLINE 100 MG: 100 INJECTION, POWDER, LYOPHILIZED, FOR SOLUTION INTRAVENOUS at 22:53

## 2018-01-01 RX ADMIN — METHYLPREDNISOLONE SODIUM SUCCINATE 60 MG: 125 INJECTION, POWDER, FOR SOLUTION INTRAMUSCULAR; INTRAVENOUS at 14:56

## 2018-01-01 RX ADMIN — FAMOTIDINE 40 MG: 40 TABLET ORAL at 09:40

## 2018-01-01 RX ADMIN — LEVOFLOXACIN 750 MG: 5 INJECTION, SOLUTION INTRAVENOUS at 17:52

## 2018-01-01 RX ADMIN — CETIRIZINE HYDROCHLORIDE 10 MG: 10 TABLET, FILM COATED ORAL at 08:19

## 2018-01-01 RX ADMIN — METHYLPREDNISOLONE ACETATE 80 MG: 40 INJECTION, SUSPENSION INTRA-ARTICULAR; INTRALESIONAL; INTRAMUSCULAR; SOFT TISSUE at 15:35

## 2018-01-01 RX ADMIN — Medication 10 ML: at 21:54

## 2018-01-01 RX ADMIN — ACETAMINOPHEN 650 MG: 325 TABLET ORAL at 16:30

## 2018-01-01 RX ADMIN — HYDROXYZINE PAMOATE 25 MG: 25 CAPSULE ORAL at 02:39

## 2018-01-01 RX ADMIN — IPRATROPIUM BROMIDE AND ALBUTEROL SULFATE 3 ML: 2.5; .5 SOLUTION RESPIRATORY (INHALATION) at 14:00

## 2018-01-01 RX ADMIN — METHYLPREDNISOLONE SODIUM SUCCINATE 60 MG: 125 INJECTION, POWDER, FOR SOLUTION INTRAMUSCULAR; INTRAVENOUS at 02:40

## 2018-01-01 RX ADMIN — LEVOFLOXACIN 500 MG: 500 TABLET, FILM COATED ORAL at 17:45

## 2018-01-01 RX ADMIN — METHYLPREDNISOLONE ACETATE 80 MG: 40 INJECTION, SUSPENSION INTRA-ARTICULAR; INTRALESIONAL; INTRAMUSCULAR; SOFT TISSUE at 15:21

## 2018-01-01 RX ADMIN — IPRATROPIUM BROMIDE AND ALBUTEROL SULFATE 3 ML: 2.5; .5 SOLUTION RESPIRATORY (INHALATION) at 20:10

## 2018-01-01 RX ADMIN — IPRATROPIUM BROMIDE AND ALBUTEROL SULFATE 3 ML: 2.5; .5 SOLUTION RESPIRATORY (INHALATION) at 13:07

## 2018-01-01 RX ADMIN — IPRATROPIUM BROMIDE AND ALBUTEROL SULFATE 3 ML: 2.5; .5 SOLUTION RESPIRATORY (INHALATION) at 18:43

## 2018-01-01 RX ADMIN — IPRATROPIUM BROMIDE AND ALBUTEROL SULFATE 3 ML: 2.5; .5 SOLUTION RESPIRATORY (INHALATION) at 06:37

## 2018-01-01 RX ADMIN — LEVOFLOXACIN 500 MG: 5 INJECTION, SOLUTION INTRAVENOUS at 17:51

## 2018-01-01 RX ADMIN — TRAMADOL HYDROCHLORIDE 50 MG: 50 TABLET, FILM COATED ORAL at 02:39

## 2018-01-01 RX ADMIN — FUROSEMIDE 20 MG: 10 INJECTION, SOLUTION INTRAMUSCULAR; INTRAVENOUS at 06:08

## 2018-01-01 RX ADMIN — MIRTAZAPINE 15 MG: 15 TABLET, FILM COATED ORAL at 21:26

## 2018-01-01 RX ADMIN — Medication 10 ML: at 06:23

## 2018-01-01 RX ADMIN — MODAFINIL 200 MG: 200 TABLET ORAL at 09:40

## 2018-01-01 RX ADMIN — CETIRIZINE HYDROCHLORIDE 10 MG: 10 TABLET, FILM COATED ORAL at 09:13

## 2018-01-01 RX ADMIN — MODAFINIL 200 MG: 200 TABLET ORAL at 08:22

## 2018-01-01 RX ADMIN — METHYLPREDNISOLONE SODIUM SUCCINATE 60 MG: 125 INJECTION, POWDER, FOR SOLUTION INTRAMUSCULAR; INTRAVENOUS at 21:50

## 2018-01-01 RX ADMIN — METHYLPREDNISOLONE SODIUM SUCCINATE 60 MG: 125 INJECTION, POWDER, FOR SOLUTION INTRAMUSCULAR; INTRAVENOUS at 22:00

## 2018-01-01 RX ADMIN — IPRATROPIUM BROMIDE AND ALBUTEROL SULFATE 3 ML: 2.5; .5 SOLUTION RESPIRATORY (INHALATION) at 22:07

## 2018-01-01 RX ADMIN — CEFTRIAXONE SODIUM 1 G: 1 INJECTION, POWDER, FOR SOLUTION INTRAMUSCULAR; INTRAVENOUS at 21:26

## 2018-01-01 RX ADMIN — FLUTICASONE PROPIONATE 2 SPRAY: 50 SPRAY, METERED NASAL at 08:20

## 2018-01-01 RX ADMIN — METHYLPREDNISOLONE SODIUM SUCCINATE 60 MG: 125 INJECTION, POWDER, FOR SOLUTION INTRAMUSCULAR; INTRAVENOUS at 08:20

## 2018-01-10 ENCOUNTER — OFFICE VISIT (OUTPATIENT)
Dept: FAMILY MEDICINE CLINIC | Facility: CLINIC | Age: 78
End: 2018-01-10

## 2018-01-10 VITALS
HEIGHT: 70 IN | DIASTOLIC BLOOD PRESSURE: 90 MMHG | BODY MASS INDEX: 20.64 KG/M2 | WEIGHT: 144.2 LBS | SYSTOLIC BLOOD PRESSURE: 160 MMHG | TEMPERATURE: 98.3 F | HEART RATE: 75 BPM | OXYGEN SATURATION: 91 %

## 2018-01-10 DIAGNOSIS — M54.9 BACK PAIN, UNSPECIFIED BACK LOCATION, UNSPECIFIED BACK PAIN LATERALITY, UNSPECIFIED CHRONICITY: Primary | ICD-10-CM

## 2018-01-10 PROCEDURE — 99213 OFFICE O/P EST LOW 20 MIN: CPT | Performed by: FAMILY MEDICINE

## 2018-01-10 PROCEDURE — 96372 THER/PROPH/DIAG INJ SC/IM: CPT | Performed by: FAMILY MEDICINE

## 2018-01-10 RX ORDER — MODAFINIL 200 MG/1
200 TABLET ORAL DAILY
Qty: 30 TABLET | Refills: 5 | Status: SHIPPED | OUTPATIENT
Start: 2018-01-10 | End: 2018-06-04 | Stop reason: SDUPTHER

## 2018-01-10 RX ORDER — TRIAMCINOLONE ACETONIDE 40 MG/ML
80 INJECTION, SUSPENSION INTRA-ARTICULAR; INTRAMUSCULAR ONCE
Status: COMPLETED | OUTPATIENT
Start: 2018-01-10 | End: 2018-01-10

## 2018-01-10 RX ADMIN — TRIAMCINOLONE ACETONIDE 80 MG: 40 INJECTION, SUSPENSION INTRA-ARTICULAR; INTRAMUSCULAR at 14:27

## 2018-01-10 NOTE — PROGRESS NOTES
Subjective   Fuad Brumfield is a 77 y.o. male.     History of Present Illness     Wants kenalog shot for his back and due for provigil rx  Doing ok.   Never got his oxygen?    Review of Systems   Constitutional: Negative for chills, fatigue and fever.   HENT: Negative for congestion, ear discharge, ear pain, facial swelling, hearing loss, postnasal drip, rhinorrhea, sinus pressure, sore throat, trouble swallowing and voice change.    Eyes: Negative for discharge, redness and visual disturbance.   Respiratory: Negative for cough, chest tightness, shortness of breath and wheezing.    Cardiovascular: Negative for chest pain and palpitations.   Gastrointestinal: Negative for abdominal pain, blood in stool, constipation, diarrhea, nausea and vomiting.   Endocrine: Negative for polydipsia and polyuria.   Genitourinary: Negative for dysuria, flank pain, hematuria and urgency.   Musculoskeletal: Positive for back pain. Negative for arthralgias, joint swelling and myalgias.   Skin: Negative for rash.   Neurological: Negative for dizziness, weakness, numbness and headaches.   Hematological: Negative for adenopathy.   Psychiatric/Behavioral: Negative for confusion and sleep disturbance. The patient is not nervous/anxious.        Objective   Physical Exam   Constitutional: He is oriented to person, place, and time. He appears well-developed and well-nourished.   HENT:   Head: Normocephalic and atraumatic.   Right Ear: External ear normal.   Left Ear: External ear normal.   Nose: Nose normal.   Eyes: Conjunctivae and EOM are normal. Pupils are equal, round, and reactive to light.   Neck: Normal range of motion.   Pulmonary/Chest: Effort normal.   Musculoskeletal: Normal range of motion.   Neurological: He is alert and oriented to person, place, and time.   Psychiatric: He has a normal mood and affect. His behavior is normal. Judgment and thought content normal.   Nursing note and vitals reviewed.      Assessment/Plan   Fuad  was seen today for follow-up.    Diagnoses and all orders for this visit:    Back pain, unspecified back location, unspecified back pain laterality, unspecified chronicity  -     triamcinolone acetonide (KENALOG-40) injection 80 mg; Inject 2 mL into the shoulder, thigh, or buttocks 1 (One) Time.    Other orders  -     modafinil (PROVIGIL) 200 MG tablet; Take 1 tablet by mouth Daily.      Doing ok.

## 2018-01-22 ENCOUNTER — OFFICE VISIT (OUTPATIENT)
Dept: FAMILY MEDICINE CLINIC | Facility: CLINIC | Age: 78
End: 2018-01-22

## 2018-01-22 VITALS
SYSTOLIC BLOOD PRESSURE: 140 MMHG | BODY MASS INDEX: 20.19 KG/M2 | OXYGEN SATURATION: 95 % | TEMPERATURE: 98.1 F | HEART RATE: 95 BPM | WEIGHT: 141 LBS | HEIGHT: 70 IN | DIASTOLIC BLOOD PRESSURE: 80 MMHG

## 2018-01-22 DIAGNOSIS — J43.9 PULMONARY EMPHYSEMA, UNSPECIFIED EMPHYSEMA TYPE (HCC): Primary | ICD-10-CM

## 2018-01-22 DIAGNOSIS — N52.9 IMPOTENCE: ICD-10-CM

## 2018-01-22 PROCEDURE — 99213 OFFICE O/P EST LOW 20 MIN: CPT | Performed by: FAMILY MEDICINE

## 2018-01-22 RX ORDER — SILDENAFIL 100 MG/1
TABLET, FILM COATED ORAL
Qty: 5 TABLET | Refills: 11 | Status: SHIPPED | OUTPATIENT
Start: 2018-01-22 | End: 2018-01-22 | Stop reason: SDUPTHER

## 2018-01-22 RX ORDER — SILDENAFIL 100 MG/1
TABLET, FILM COATED ORAL
Qty: 5 TABLET | Refills: 11 | Status: SHIPPED | OUTPATIENT
Start: 2018-01-22

## 2018-01-22 NOTE — PROGRESS NOTES
Subjective   Fuad Brumfield is a 77 y.o. male.     History of Present Illness     im told since the 6 minute walk for oxygen I didn't record o2 sat at rest? It was not valid  Needs refill of viagra.  Doesn't want to take 5x20mg sildenafil tablets to save $    Review of Systems   Constitutional: Negative for chills, fatigue and fever.   HENT: Negative for congestion, ear discharge, ear pain, facial swelling, hearing loss, postnasal drip, rhinorrhea, sinus pressure, sore throat, trouble swallowing and voice change.    Eyes: Negative for discharge, redness and visual disturbance.   Respiratory: Positive for shortness of breath. Negative for cough, chest tightness and wheezing.    Cardiovascular: Negative for chest pain and palpitations.   Gastrointestinal: Negative for abdominal pain, blood in stool, constipation, diarrhea, nausea and vomiting.   Endocrine: Negative for polydipsia and polyuria.   Genitourinary: Negative for dysuria, flank pain, hematuria and urgency.   Musculoskeletal: Positive for back pain. Negative for arthralgias, joint swelling and myalgias.   Skin: Negative for rash.   Neurological: Negative for dizziness, weakness, numbness and headaches.   Hematological: Negative for adenopathy.   Psychiatric/Behavioral: Negative for confusion and sleep disturbance. The patient is not nervous/anxious.        Objective   Physical Exam   Constitutional: He is oriented to person, place, and time. He appears well-developed and well-nourished.   HENT:   Head: Normocephalic and atraumatic.   Right Ear: External ear normal.   Left Ear: External ear normal.   Nose: Nose normal.   Eyes: Conjunctivae and EOM are normal. Pupils are equal, round, and reactive to light.   Neck: Normal range of motion.   Pulmonary/Chest: Effort normal.   Musculoskeletal: Normal range of motion.   Neurological: He is alert and oriented to person, place, and time.   Psychiatric: He has a normal mood and affect. His behavior is normal.  Judgment and thought content normal.   Nursing note and vitals reviewed.      Assessment/Plan   Fuad was seen today for copd.    Diagnoses and all orders for this visit:    Pulmonary emphysema, unspecified emphysema type    Impotence  -     Discontinue: sildenafil (VIAGRA) 100 MG tablet; 1/2 to 1 tablet 30 min up to 4 hours before sex.  -     sildenafil (VIAGRA) 100 MG tablet; 1/2 to 1 tablet 30 min up to 4 hours before sex.      WITHOUT OXYGEN:    REST 96%  1 MIN WALK 95%  2 MIN WALK 87%  3 MIN WALK 85%    RESTING WITH O2 AT 2LPM 98%  1 MIN WALK WITH O2 AT 2LPM 98%  2 MIN WALK WITH O2 AT 2LPM 91%  3 MIN WALK WITH O2 AT 2LPM 85%

## 2018-03-27 ENCOUNTER — OFFICE VISIT (OUTPATIENT)
Dept: FAMILY MEDICINE CLINIC | Facility: CLINIC | Age: 78
End: 2018-03-27

## 2018-03-27 DIAGNOSIS — L29.9 ITCHING: ICD-10-CM

## 2018-03-27 DIAGNOSIS — R21 RASH: Primary | ICD-10-CM

## 2018-03-27 PROCEDURE — 96372 THER/PROPH/DIAG INJ SC/IM: CPT | Performed by: FAMILY MEDICINE

## 2018-03-27 PROCEDURE — 99213 OFFICE O/P EST LOW 20 MIN: CPT | Performed by: FAMILY MEDICINE

## 2018-03-27 RX ADMIN — TRIAMCINOLONE ACETONIDE 40 MG: 40 INJECTION, SUSPENSION INTRA-ARTICULAR; INTRAMUSCULAR at 04:10

## 2018-03-28 VITALS
DIASTOLIC BLOOD PRESSURE: 70 MMHG | OXYGEN SATURATION: 96 % | SYSTOLIC BLOOD PRESSURE: 130 MMHG | TEMPERATURE: 98.3 F | WEIGHT: 139.8 LBS | HEART RATE: 99 BPM | BODY MASS INDEX: 20.01 KG/M2 | HEIGHT: 70 IN

## 2018-03-28 RX ORDER — TRIAMCINOLONE ACETONIDE 40 MG/ML
40 INJECTION, SUSPENSION INTRA-ARTICULAR; INTRAMUSCULAR ONCE
Status: COMPLETED | OUTPATIENT
Start: 2018-03-27 | End: 2018-03-27

## 2018-03-28 NOTE — PROGRESS NOTES
Subjective   Fuad Brumfield is a 77 y.o. male.     History of Present Illness     Wants kenalog shot this time also for itching?  He says he is using the lachydrin lotion?  Denies taking ultram?  Has been ongoing for a long time.  Wants to know if can take two 25mg atarax for the itch    Review of Systems   Constitutional: Negative for chills, fatigue and fever.   HENT: Negative for congestion, ear discharge, ear pain, facial swelling, hearing loss, postnasal drip, rhinorrhea, sinus pressure, sore throat, trouble swallowing and voice change.    Eyes: Negative for discharge, redness and visual disturbance.   Respiratory: Negative for cough, chest tightness, shortness of breath and wheezing.    Cardiovascular: Negative for chest pain and palpitations.   Gastrointestinal: Negative for abdominal pain, blood in stool, constipation, diarrhea, nausea and vomiting.   Endocrine: Negative for polydipsia and polyuria.   Genitourinary: Negative for dysuria, flank pain, hematuria and urgency.   Musculoskeletal: Negative for arthralgias, back pain, joint swelling and myalgias.   Skin: Negative for rash.   Neurological: Negative for dizziness, weakness, numbness and headaches.   Hematological: Negative for adenopathy.   Psychiatric/Behavioral: Negative for confusion and sleep disturbance. The patient is not nervous/anxious.        Objective   Physical Exam   Constitutional: He is oriented to person, place, and time. He appears well-developed and well-nourished.   HENT:   Head: Normocephalic and atraumatic.   Right Ear: External ear normal.   Left Ear: External ear normal.   Nose: Nose normal.   Eyes: Conjunctivae and EOM are normal. Pupils are equal, round, and reactive to light.   Neck: Normal range of motion.   Pulmonary/Chest: Effort normal.   Musculoskeletal: Normal range of motion.   Neurological: He is alert and oriented to person, place, and time.   Skin:   hyperpigemented stuck on appearing papule left upper back with  excoriation around it.  Skin on back is dry.      Psychiatric: He has a normal mood and affect. His behavior is normal. Judgment and thought content normal.   Nursing note and vitals reviewed.      Assessment/Plan   Fuad was seen today for rash.    Diagnoses and all orders for this visit:    Rash  -     triamcinolone acetonide (KENALOG-40) injection 40 mg; Inject 1 mL into the shoulder, thigh, or buttocks 1 (One) Time.    Itching    skin is dry.  Computer down, gave rx for triamcinolone 0.1% ointment apply bid for itch.

## 2018-06-04 ENCOUNTER — OFFICE VISIT (OUTPATIENT)
Dept: FAMILY MEDICINE CLINIC | Facility: CLINIC | Age: 78
End: 2018-06-04

## 2018-06-04 VITALS
DIASTOLIC BLOOD PRESSURE: 66 MMHG | SYSTOLIC BLOOD PRESSURE: 150 MMHG | HEART RATE: 75 BPM | OXYGEN SATURATION: 91 % | WEIGHT: 137 LBS | TEMPERATURE: 99.7 F | HEIGHT: 70 IN | BODY MASS INDEX: 19.61 KG/M2

## 2018-06-04 DIAGNOSIS — J44.9 CHRONIC OBSTRUCTIVE PULMONARY DISEASE, UNSPECIFIED COPD TYPE (HCC): ICD-10-CM

## 2018-06-04 DIAGNOSIS — R05.9 COUGH: Primary | ICD-10-CM

## 2018-06-04 DIAGNOSIS — J06.9 ACUTE URI: ICD-10-CM

## 2018-06-04 PROCEDURE — 99214 OFFICE O/P EST MOD 30 MIN: CPT | Performed by: FAMILY MEDICINE

## 2018-06-04 PROCEDURE — 96372 THER/PROPH/DIAG INJ SC/IM: CPT | Performed by: FAMILY MEDICINE

## 2018-06-04 RX ORDER — FLUTICASONE PROPIONATE 50 MCG
2 SPRAY, SUSPENSION (ML) NASAL DAILY
Qty: 1 BOTTLE | Refills: 11 | Status: SHIPPED | OUTPATIENT
Start: 2018-06-04 | End: 2019-01-01

## 2018-06-04 RX ORDER — TRIAMCINOLONE ACETONIDE 40 MG/ML
80 INJECTION, SUSPENSION INTRA-ARTICULAR; INTRAMUSCULAR ONCE
Status: COMPLETED | OUTPATIENT
Start: 2018-06-04 | End: 2018-06-04

## 2018-06-04 RX ORDER — LEVOFLOXACIN 500 MG/1
500 TABLET, FILM COATED ORAL DAILY
Qty: 5 TABLET | Refills: 0 | Status: SHIPPED | OUTPATIENT
Start: 2018-06-04 | End: 2018-01-01

## 2018-06-04 RX ORDER — MODAFINIL 200 MG/1
200 TABLET ORAL DAILY
Qty: 30 TABLET | Refills: 5 | Status: SHIPPED | OUTPATIENT
Start: 2018-06-04 | End: 2018-06-05 | Stop reason: SDUPTHER

## 2018-06-04 RX ORDER — MODAFINIL 200 MG/1
200 TABLET ORAL DAILY
Qty: 30 TABLET | Refills: 5 | Status: SHIPPED | OUTPATIENT
Start: 2018-06-04 | End: 2018-06-04 | Stop reason: SDUPTHER

## 2018-06-04 RX ORDER — ALBUTEROL SULFATE 2.5 MG/3ML
2.5 SOLUTION RESPIRATORY (INHALATION)
Qty: 300 ML | Refills: 12 | Status: SHIPPED | OUTPATIENT
Start: 2018-06-04 | End: 2019-01-01

## 2018-06-04 RX ORDER — LORATADINE 10 MG/1
10 TABLET ORAL DAILY
Qty: 30 TABLET | Refills: 11 | Status: SHIPPED | OUTPATIENT
Start: 2018-06-04 | End: 2018-06-05 | Stop reason: SDUPTHER

## 2018-06-04 RX ADMIN — TRIAMCINOLONE ACETONIDE 80 MG: 40 INJECTION, SUSPENSION INTRA-ARTICULAR; INTRAMUSCULAR at 18:01

## 2018-06-04 NOTE — PROGRESS NOTES
" Subjective   Fuad Brumfield is a 77 y.o. male.     History of Present Illness   /66 (BP Location: Left arm, Patient Position: Sitting, Cuff Size: Adult)   Pulse 75   Temp 99.7 °F (37.6 °C) (Temporal Artery )   Ht 177.8 cm (70\")   Wt 62.1 kg (137 lb)   SpO2 91%   BMI 19.66 kg/m²     Feeling bad 1 week.  Increasing sob.  Itching is chronic, skin is dry and he has various treatment for this.  Vomiting.      Review of Systems   Constitutional: Positive for fatigue. Negative for chills and fever.   HENT: Positive for congestion. Negative for ear discharge, ear pain, facial swelling, hearing loss, postnasal drip, rhinorrhea, sinus pressure, sore throat, trouble swallowing and voice change.    Eyes: Negative for discharge, redness and visual disturbance.   Respiratory: Positive for cough. Negative for chest tightness, shortness of breath and wheezing.    Cardiovascular: Negative for chest pain and palpitations.   Gastrointestinal: Negative for abdominal pain, blood in stool, constipation, diarrhea, nausea and vomiting.   Endocrine: Negative for polydipsia and polyuria.   Genitourinary: Negative for dysuria, flank pain, hematuria and urgency.   Musculoskeletal: Negative for arthralgias, back pain, joint swelling and myalgias.   Skin: Negative for rash.   Neurological: Negative for dizziness, weakness, numbness and headaches.   Hematological: Negative for adenopathy.   Psychiatric/Behavioral: Negative for confusion and sleep disturbance. The patient is not nervous/anxious.        Objective   Physical Exam   Constitutional: He is oriented to person, place, and time. He appears well-developed and well-nourished.   HENT:   Head: Normocephalic and atraumatic.   Right Ear: External ear normal.   Left Ear: External ear normal.   Nose: Nose normal.   Eyes: Conjunctivae and EOM are normal. Pupils are equal, round, and reactive to light.   Neck: Normal range of motion.   Cardiovascular: Normal rate and regular rhythm.  "   Pulmonary/Chest: Effort normal.   Diminished some slight rhonchi   Musculoskeletal: Normal range of motion.   Neurological: He is alert and oriented to person, place, and time.   Psychiatric: He has a normal mood and affect. His behavior is normal. Judgment and thought content normal.   Nursing note and vitals reviewed.      Assessment/Plan   Fuad was seen today for uri, vomiting and itching.    Diagnoses and all orders for this visit:    Cough  -     XR Chest PA & Lateral    Acute URI  -     loratadine (CLARITIN) 10 MG tablet; Take 1 tablet by mouth Daily. Take one or two tablets daily.    Chronic obstructive pulmonary disease, unspecified COPD type  -     ipratropium (ATROVENT) 0.02 % nebulizer solution; Take 2.5 mL by nebulization 4 (Four) Times a Day.  -     albuterol (PROVENTIL) (2.5 MG/3ML) 0.083% nebulizer solution; Take 2.5 mg by nebulization 4 (Four) Times a Day. Inhale via nebulizer four times daily as needed.  -     triamcinolone acetonide (KENALOG-40) injection 80 mg; Inject 2 mL into the shoulder, thigh, or buttocks 1 (One) Time.    Other orders  -     fluticasone (FLONASE) 50 MCG/ACT nasal spray; 2 sprays into each nostril Daily.  -     Discontinue: modafinil (PROVIGIL) 200 MG tablet; Take 1 tablet by mouth Daily.  -     levoFLOXacin (LEVAQUIN) 500 MG tablet; Take 1 tablet by mouth Daily.  -     modafinil (PROVIGIL) 200 MG tablet; Take 1 tablet by mouth Daily.      Some changes right lung.  Cover for pneumonia.  Since has been vomiting, given  levaquin just in case some aspiration.

## 2018-06-05 ENCOUNTER — TELEPHONE (OUTPATIENT)
Dept: FAMILY MEDICINE CLINIC | Facility: CLINIC | Age: 78
End: 2018-06-05

## 2018-06-05 DIAGNOSIS — J06.9 ACUTE URI: ICD-10-CM

## 2018-06-05 RX ORDER — LORATADINE 10 MG/1
10 TABLET ORAL DAILY
Qty: 30 TABLET | Refills: 11 | Status: SHIPPED | OUTPATIENT
Start: 2018-06-05

## 2018-06-05 RX ORDER — LORATADINE 10 MG/1
10 TABLET ORAL DAILY
Qty: 30 TABLET | Refills: 11 | Status: SHIPPED | OUTPATIENT
Start: 2018-06-05 | End: 2018-06-05 | Stop reason: SDUPTHER

## 2018-06-05 RX ORDER — MODAFINIL 200 MG/1
200 TABLET ORAL DAILY
Qty: 30 TABLET | Refills: 5 | Status: SHIPPED | OUTPATIENT
Start: 2018-06-05 | End: 2018-01-01 | Stop reason: SDUPTHER

## 2018-06-23 PROBLEM — J96.91 RESPIRATORY FAILURE WITH HYPOXIA (HCC): Status: ACTIVE | Noted: 2018-01-01

## 2018-06-23 PROBLEM — M54.9 CHRONIC BACK PAIN: Status: ACTIVE | Noted: 2018-01-01

## 2018-06-23 PROBLEM — G89.29 CHRONIC BACK PAIN: Status: ACTIVE | Noted: 2018-01-01

## 2018-06-23 PROBLEM — J18.9 PNEUMONIA OF RIGHT LOWER LOBE DUE TO INFECTIOUS ORGANISM: Status: ACTIVE | Noted: 2018-01-01

## 2018-06-23 PROBLEM — J18.9 CAP (COMMUNITY ACQUIRED PNEUMONIA): Status: ACTIVE | Noted: 2018-01-01

## 2018-06-23 PROBLEM — R09.02 HYPOXIA: Status: ACTIVE | Noted: 2018-01-01

## 2018-07-02 NOTE — PROGRESS NOTES
Subjective   Fuad Brumfield is a 78 y.o. male.     History of Present Illness     hosptal follow up pneumonnia right lower lobe.  Has follow up pulmonology tomorrow, yenni daniels.  Also during workup some subcarinal adenopahty noted and concern is neoplastic process.  He has thrush on tongue  Irritable,  On steroids  Xanax 5 tablets didn't do anything for him.  Currently oxygen nc    Review of Systems   Constitutional: Negative for chills, fatigue and fever.   HENT: Negative for congestion, ear discharge, ear pain, facial swelling, hearing loss, postnasal drip, rhinorrhea, sinus pressure, sore throat, trouble swallowing and voice change.    Eyes: Negative for discharge, redness and visual disturbance.   Respiratory: Negative for cough, chest tightness, shortness of breath and wheezing.    Cardiovascular: Negative for chest pain and palpitations.   Gastrointestinal: Negative for abdominal pain, blood in stool, constipation, diarrhea, nausea and vomiting.   Endocrine: Negative for polydipsia and polyuria.   Genitourinary: Negative for dysuria, flank pain, hematuria and urgency.   Musculoskeletal: Negative for arthralgias, back pain, joint swelling and myalgias.   Skin: Negative for rash.   Neurological: Negative for dizziness, weakness, numbness and headaches.   Hematological: Negative for adenopathy.   Psychiatric/Behavioral: Negative for confusion and sleep disturbance. The patient is not nervous/anxious.            /62 (BP Location: Left arm, Patient Position: Sitting, Cuff Size: Adult)   Pulse 103   Temp 98.5 °F (36.9 °C) (Temporal Artery )   SpO2 94%       Objective     Physical Exam   Constitutional: He is oriented to person, place, and time. He appears well-developed and well-nourished.   HENT:   Head: Normocephalic and atraumatic.   Right Ear: External ear normal.   Left Ear: External ear normal.   Nose: Nose normal.   Tongue red and dry   Eyes: Conjunctivae and EOM are normal. Pupils are equal,  round, and reactive to light.   Neck: Normal range of motion.   Pulmonary/Chest: Effort normal.   Musculoskeletal: Normal range of motion.   Neurological: He is alert and oriented to person, place, and time.   Psychiatric: He has a normal mood and affect. His behavior is normal. Judgment and thought content normal.   Nursing note and vitals reviewed.          PAST MEDICAL HISTORY     Past Medical History:   Diagnosis Date   • Allergic rhinitis    • Allergic rhinitis due to pollen    • Anxiety state    • Atopic dermatitis    • Backache    • Chronic obstructive lung disease (CMS/HCC)    • Common cold    • Constipation    • Contact dermatitis    • Cough    • Diarrhea    • Drug therapy     Long-term drug therapy     • Dyspnea    • Exanthematous disorder    • Hypoxia    • Itch of skin    • Malaise and fatigue     Other malaise and fatigue     • Male erectile disorder    • Nocturia     finding     • Osteoarthritis    • Pain in joint, ankle and foot    • Rash     O/E - a rash    • Screening for malignant neoplasm of prostate    • Upper respiratory infection       PAST SURGICAL HISTORY     Past Surgical History:   Procedure Laterality Date   • CATARACT EXTRACTION Left 12/09/2003    Cataract removal (1)   - Left eye   • INJECTION OF MEDICATION  09/09/2015    Kenalog (11) - EDA Pierson      SOCIAL HISTORY     Social History     Social History   • Marital status:      Social History Main Topics   • Smoking status: Former Smoker   • Smokeless tobacco: Former User   • Alcohol use No   • Drug use: No   • Sexual activity: Defer     Other Topics Concern   • Not on file      ALLERGIES   Patient has no known allergies.   MEDICATIONS     Current Outpatient Prescriptions   Medication Sig Dispense Refill   • acetaminophen (TYLENOL) 325 MG tablet Take 2 tablets by mouth Every 4 (Four) Hours As Needed for Mild Pain . 30 tablet 0   • albuterol (PROVENTIL) (2.5 MG/3ML) 0.083% nebulizer solution Take 2.5 mg by nebulization 4 (Four) Times  a Day. Inhale via nebulizer four times daily as needed. 300 mL 12   • ammonium lactate (LAC-HYDRIN) 12 % lotion Apply  topically. ammonium lactate 12 % lotion  -  Apply as directed 2 times per day as needed.      • famotidine (PEPCID) 40 MG tablet Take 1 tablet by mouth Daily. 30 tablet 0   • fluticasone (FLONASE) 50 MCG/ACT nasal spray 2 sprays into each nostril Daily. 1 bottle 11   • hydrOXYzine (ATARAX) 25 MG tablet Take 1 tablet by mouth Every 6 (Six) Hours As Needed for Itching. 120 tablet 11   • ipratropium (ATROVENT) 0.02 % nebulizer solution Take 2.5 mL by nebulization 4 (Four) Times a Day. 300 mL 12   • levoFLOXacin (LEVAQUIN) 500 MG tablet Take 1 tablet by mouth Daily for 5 days. 5 tablet 0   • loratadine (CLARITIN) 10 MG tablet Take 1 tablet by mouth Daily. 30 tablet 11   • modafinil (PROVIGIL) 200 MG tablet Take 1 tablet by mouth Daily. 30 tablet 5   • PredniSONE (DELTASONE) 10 MG (48) tablet pack Medrol dose pack , take as directed 1 each 0   • traMADol (ULTRAM) 50 MG tablet Take 1 tablet by mouth Every 6 (Six) Hours As Needed for Moderate Pain (4-6). 120 tablet 2   • umeclidinium-vilanterol (ANORO ELLIPTA) 62.5-25 MCG/INH aerosol powder  inhaler Inhale 1 puff Daily. 1 each 11   • busPIRone (BUSPAR) 5 MG tablet Take 1 tablet by mouth 3 (Three) Times a Day. 90 tablet 5   • ipratropium-albuterol (DUO-NEB) 0.5-2.5 mg/3 ml nebulizer Take 3 mL by nebulization Every 4 (Four) Hours As Needed for Wheezing. 360 mL 0   • methylphenidate (RITALIN) 10 MG tablet Take  by mouth. Take 1 or 2 tablets daily.     • nystatin (MYCOSTATIN) 843139 UNIT/ML suspension Swish and swallow 5 mL 4 (Four) Times a Day. Swish for 30 seconds then swallow 240 mL 5   • sildenafil (VIAGRA) 100 MG tablet 1/2 to 1 tablet 30 min up to 4 hours before sex. 5 tablet 11     No current facility-administered medications for this visit.         The following portions of the patient's history were reviewed and updated as appropriate: allergies,  "current medications, past family history, past medical history, past social history, past surgical history and problem list.        Assessment/Plan   Fuad was seen today for follow-up and pain.    Diagnoses and all orders for this visit:    Oral thrush  -     nystatin (MYCOSTATIN) 472436 UNIT/ML suspension; Swish and swallow 5 mL 4 (Four) Times a Day. Swish for 30 seconds then swallow    Pneumonia of right lower lobe due to infectious organism    Anxiety    Other orders  -     busPIRone (BUSPAR) 5 MG tablet; Take 1 tablet by mouth 3 (Three) Times a Day.    encouraged he looked good, color was good.      buspar for anxiety    Keep follow up with dr daniels, we discussed concern \"neoplastic process.\"                   No Follow-up on file.                  This document has been electronically signed by Thomas Pierson MD on July 2, 2018 3:05 PM     "

## 2018-07-03 NOTE — PROGRESS NOTES
"This gentleman has advanced COPD, hypoxemia, recent pneumonia, mediastinal adenopathy subcarinal suspicious for malignancy.  He is had weight loss recently and shortness of breath    ROS    Constitutional-no night sweats headaches  GI no abdominal pain nausea or diarrhea  Neuro no seizure or neurologic deficits  Musculoskeletal no deformity or joint pain   no dysuria or hematuria  Skin no rash or other lesions  All other systems reviewed and were negative except for the above.      Physical Exam  /68 (BP Location: Left arm, Patient Position: Sitting, Cuff Size: Adult)   Pulse 100   Ht 182.9 cm (72\")   Wt 58.7 kg (129 lb 8 oz)   SpO2 97%   BMI 17.56 kg/m²   Vital signs as above  Pupils equally round and reactive to light and accommodation, neck no JVD or adenopathy.  Cardiovascular regular rhythm and rate no murmur or gallop.  Abdomen soft no organomegaly tenderness.  Extremities no clubbing cyanosis or edema.  No cervical adenopathy.  No skin rash.  Neurologic good strength bilaterally without deficits  Chronically ill-appearing dyspneic white male using oxygen    CT reveals mediastinal adenopathy subcarinal region which appears to resemble possible malignancy    Impression advanced COPD with hypoxemia, mediastinal adenopathy suspicious for pulmonary malignancy    Plan PET scan, return afterwards          This document has been electronically signed by Indio Larsen MD on July 3, 2018 2:23 PM      "

## 2018-07-09 NOTE — TELEPHONE ENCOUNTER
[facility]ON [facility] WITH [facility] CALLED STATING PATIENT HAS NO APPETITE AND IS LOSING WEIGHT.  PATIENT WOULD LIKE TO HAVE RX TO HELP HIS APPETITE.    VITALIY IRVIN

## 2018-07-19 NOTE — PROGRESS NOTES
"This gentleman has COPD recent pneumonia and subcarinal adenopathy suspicious for malignancy.  He remains weak and short of breath.  He persists in some discolored sputum.    ROS    Constitutional-no night sweats weight loss headaches  GI no abdominal pain nausea or diarrhea  Neuro no seizure or neurologic deficits  Musculoskeletal no deformity or joint pain   no dysuria or hematuria  Skin no rash or other lesions  All other systems reviewed and were negative except for the above.      Physical Exam  /72   Pulse 92   Ht 182.9 cm (72\")   Wt 59.5 kg (131 lb 1.6 oz)   SpO2 97%   BMI 17.78 kg/m²   Vital signs as above  Pupils equally round and reactive to light and accommodation, neck no JVD or adenopathy.  Cardiovascular regular rhythm and rate no murmur or gallop.  Abdomen soft no organomegaly tenderness.  Extremities no clubbing cyanosis or edema.  No cervical adenopathy.  No skin rash.  Neurologic good strength bilaterally without deficits  Dyspneic chronically ill-appearing white male in a wheelchair using oxygen.  Lungs reveal diminished breath sounds and basilar rales    PET scan revealed a suspicious for malignancy subcarinal adenopathy however the take up was of a low degree which is frequently seen in inflammatory disease    Impression pneumonia with subcarinal adenopathy infectious versus malignant    Plan considering this man's general poor condition would like to treat him with antibiotic and follow his CT in the future.  Will take doxycycline and see me back in 1 month          This document has been electronically signed by Indio Larsen MD on July 19, 2018 3:04 PM      "

## 2018-07-24 NOTE — TELEPHONE ENCOUNTER
SCOT FROM [facility] STATED THAT [facility] DOESN'T SEEM TO BE HELPING PATIENT'S APPETITE.      SHE ALSO STATED HE NEEDS REFILL FOR PEPCID 40 MG 1 QD  VITALIY BANKS    SHE SAID RADIOLOGIST SAID PATIENT HAD A TUMOR, BUT DR. MACDONALD WANTS HIM TO TAKE ANTIBIOTICS.

## 2018-08-03 NOTE — PROGRESS NOTES
"This gentleman has advanced COPD, weight loss, subcarinal adenopathy, PET scan equivocal for infection versus malignancy, persistent weight loss.  He complains of shortness of breath purulent sputum poor appetite.  He denies chest pain or hemoptysis    ROS    Constitutional-no night sweatsheadaches  GI no abdominal pain nausea or diarrhea  Neuro no seizure or neurologic deficits  Musculoskeletal no deformity or joint pain   no dysuria or hematuria  Skin no rash or other lesions  All other systems reviewed and were negative except for the above.      Physical Exam  /72 (BP Location: Left arm, Patient Position: Sitting, Cuff Size: Adult)   Pulse 94   Ht 182.9 cm (72\")   Wt 57.6 kg (127 lb)   SpO2 92% Comment: 2 L OF O2  BMI 17.22 kg/m²   Vital signs as above  Pupils equally round and reactive to light and accommodation, neck no JVD or adenopathy.  Cardiovascular regular rhythm and rate no murmur or gallop.  Abdomen soft no organomegaly tenderness.  Extremities no clubbing cyanosis or edema.  No cervical adenopathy.  No skin rash.  Neurologic good strength bilaterally without deficits  Chronically ill-appearing dyspneic white male, lungs reveal scattered rhonchi and diminished breath sounds    Impression COPD, weight loss, subcarinal adenopathy, general debility    Plan sputum culture, Depo-Medrol, Ceftin, periactin, Depo-Medrol, return in a couple weeks.  This patient very well may have a malignancy however he is so debilitated this point I doubt that any invasive workup or treatment would be tolerated          This document has been electronically signed by Indio Larsen MD on August 3, 2018 1:49 PM      "

## 2018-08-10 NOTE — TELEPHONE ENCOUNTER
----- Message from Anuradha Marquez sent at 8/9/2018  3:39 PM CDT -----  Contact: [phone]  Results (spitting in a cup)  He has appt 8/20 but they understood  was to call back. Daughter is Jacque Sosa.  She said meds given are not helping. He seems to be declining.  Please call one way or another, she is very concerned

## 2018-08-20 NOTE — PROGRESS NOTES
"This gentleman has COPD, severe weight loss, PET scan suggestive of malignancy in the chest.  He is receiving hospice treatments.  Both the patient and the family have a lot of questions today.  He has difficulty walking and difficulty swallowing.  He denies chest pain or hemoptysis.    ROS    Constitutional-no night sweats weight loss headaches  GI no abdominal pain nausea or diarrhea  Neuro no seizure or neurologic deficits  Musculoskeletal no deformity or joint pain   no dysuria or hematuria  Skin no rash or other lesions  All other systems reviewed and were negative except for the above.      Physical Exam  /66 (BP Location: Left arm, Patient Position: Sitting, Cuff Size: Adult)   Pulse (!) 124   Ht 182.9 cm (72\")   Wt 52.6 kg (116 lb)   SpO2 93%   BMI 15.73 kg/m²   Vital signs as above  Pupils equally round and reactive to light and accommodation, neck no JVD or adenopathy.  Cardiovascular regular rhythm and rate no murmur or gallop.  Abdomen soft no organomegaly tenderness.  Extremities no clubbing cyanosis or edema.  No cervical adenopathy.  No skin rash.  Neurologic good strength bilaterally without deficits  Chronicle ill-appearing white male appears wasted and  is in a wheelchair.  Lungs reveal diminished breath sounds    Impression general debility, advanced COPD, weight loss, probable thoracic malignancy.    Because of this man's general condition I do not believe it would be in his best interest to have invasive procedures to prove a malignancy which week cannot treat.  I believe he would be best served by hospice        This document has been produced with the assistance of Dragon dictation  This document has been electronically signed by Indio Larsen MD on August 20, 2018 3:13 PM      "

## 2018-08-22 NOTE — TELEPHONE ENCOUNTER
PATIENT'S DAUGHTER CALLED AND SAID THAT PATIENT DECIDED HE DIDN'T WANT HOSPICE, LAST WEEK BECAUSE HE WANTED TO GO SEE DR. MACDONALD.  DR. MACDONALD TOLD HIM HOW BAD HIS CONDITION IS.  HIS DAUGHTER ASKED IF YOU CAN SEND IN STRONGER RX FOR HIS NERVES, TO HELP HIM SLEEP, AND FOR PAIN.  [facility]

## 2018-08-23 NOTE — PROGRESS NOTES
Subjective   Fuad Brumfield is a 78 y.o. male.     History of Present Illness     Adenopathy chest ct scan and pet scan, concern is lung cancer but too weak to get tissue diagnosis.  He was on hospice for some time but was sleeping all the time and canceled hospice.  He is not eating and says he is in the stage of dying and wants home health not hospice.  He says he may switch to hospice in future.   He says he wants to sleep now and have no pain.    They want a shot of something to build him up, steroid shot.     Review of Systems   Constitutional: Negative for chills, fatigue and fever.   HENT: Negative for congestion, ear discharge, ear pain, facial swelling, hearing loss, postnasal drip, rhinorrhea, sinus pressure, sore throat, trouble swallowing and voice change.    Eyes: Negative for discharge, redness and visual disturbance.   Respiratory: Positive for shortness of breath. Negative for cough, chest tightness and wheezing.    Cardiovascular: Negative for chest pain and palpitations.   Gastrointestinal: Negative for abdominal pain, blood in stool, constipation, diarrhea, nausea and vomiting.   Endocrine: Negative for polydipsia and polyuria.   Genitourinary: Negative for dysuria, flank pain, hematuria and urgency.   Musculoskeletal: Negative for arthralgias, back pain, joint swelling and myalgias.   Skin: Negative for rash.   Neurological: Negative for dizziness, weakness, numbness and headaches.   Hematological: Negative for adenopathy.   Psychiatric/Behavioral: Negative for confusion and sleep disturbance. The patient is nervous/anxious.            /70 (BP Location: Left arm, Patient Position: Sitting, Cuff Size: Adult)   Pulse 113   Temp 99 °F (37.2 °C) (Temporal Artery )   SpO2 95%       Objective     Physical Exam   Constitutional: He is oriented to person, place, and time. He appears well-developed.   113 lbs and frail   HENT:   Head: Normocephalic and atraumatic.   Right Ear: External ear  normal.   Left Ear: External ear normal.   Nose: Nose normal.   Eyes: Pupils are equal, round, and reactive to light. Conjunctivae and EOM are normal.   Neck: Normal range of motion.   Pulmonary/Chest: Effort normal.   Musculoskeletal: Normal range of motion.   Neurological: He is alert and oriented to person, place, and time.   Psychiatric: He has a normal mood and affect. His behavior is normal. Judgment and thought content normal.   Nursing note and vitals reviewed.          PAST MEDICAL HISTORY     Past Medical History:   Diagnosis Date   • Allergic rhinitis    • Allergic rhinitis due to pollen    • Anxiety state    • Atopic dermatitis    • Backache    • Chronic obstructive lung disease (CMS/HCC)    • Common cold    • Constipation    • Contact dermatitis    • Cough    • Diarrhea    • Drug therapy     Long-term drug therapy     • Dyspnea    • Exanthematous disorder    • Hypoxia    • Itch of skin    • Malaise and fatigue     Other malaise and fatigue     • Male erectile disorder    • Nocturia     finding     • Osteoarthritis    • Pain in joint, ankle and foot    • Rash     O/E - a rash    • Screening for malignant neoplasm of prostate    • Upper respiratory infection       PAST SURGICAL HISTORY     Past Surgical History:   Procedure Laterality Date   • CATARACT EXTRACTION Left 12/09/2003    Cataract removal (1)   - Left eye   • INJECTION OF MEDICATION  09/09/2015    Kenalog (11) - EDA Pierson      SOCIAL HISTORY     Social History     Social History   • Marital status:      Social History Main Topics   • Smoking status: Former Smoker   • Smokeless tobacco: Former User   • Alcohol use No   • Drug use: No   • Sexual activity: Defer     Other Topics Concern   • Not on file      ALLERGIES   Patient has no known allergies.   MEDICATIONS     Current Outpatient Prescriptions   Medication Sig Dispense Refill   • busPIRone (BUSPAR) 5 MG tablet Take 1 tablet by mouth 3 (Three) Times a Day. 90 tablet 5   • famotidine  (PEPCID) 40 MG tablet Take 1 tablet by mouth Daily. 30 tablet 11   • fluticasone (FLONASE) 50 MCG/ACT nasal spray 2 sprays into each nostril Daily. 1 bottle 11   • hydrOXYzine (ATARAX) 25 MG tablet Take 1 tablet by mouth Every 6 (Six) Hours As Needed for Itching. 120 tablet 11   • loratadine (CLARITIN) 10 MG tablet Take 1 tablet by mouth Daily. 30 tablet 11   • nystatin (MYCOSTATIN) 806296 UNIT/ML suspension Swish and swallow 5 mL 4 (Four) Times a Day. Swish for 30 seconds then swallow 240 mL 5   • traMADol (ULTRAM) 50 MG tablet Take 1 tablet by mouth Every 6 (Six) Hours As Needed for Moderate Pain (4-6). 120 tablet 2   • traZODone (DESYREL) 50 MG tablet Take 1 tablet by mouth Every Night. 30 tablet 5   • acetaminophen (TYLENOL) 325 MG tablet Take 2 tablets by mouth Every 4 (Four) Hours As Needed for Mild Pain . 30 tablet 0   • albuterol (PROVENTIL) (2.5 MG/3ML) 0.083% nebulizer solution Take 2.5 mg by nebulization 4 (Four) Times a Day. Inhale via nebulizer four times daily as needed. 300 mL 12   • ammonium lactate (LAC-HYDRIN) 12 % lotion Apply  topically. ammonium lactate 12 % lotion  -  Apply as directed 2 times per day as needed.      • cyproheptadine (PERIACTIN) 4 MG tablet Take 1 tablet by mouth 3 (Three) Times a Day As Needed for Allergies. 40 tablet 1   • ipratropium (ATROVENT) 0.02 % nebulizer solution Take 2.5 mL by nebulization 4 (Four) Times a Day. 300 mL 12   • ipratropium-albuterol (DUO-NEB) 0.5-2.5 mg/3 ml nebulizer Take 3 mL by nebulization Every 4 (Four) Hours As Needed for Wheezing. 360 mL 0   • LORazepam (ATIVAN) 0.5 MG tablet Take 1 tablet by mouth Every 6 (Six) Hours As Needed for Anxiety. 120 tablet 0   • megestrol (MEGACE ORAL) 40 MG/ML suspension Take 10 mL by mouth Daily. 300 mL 2   • methylphenidate (RITALIN) 10 MG tablet Take  by mouth. Take 1 or 2 tablets daily.     • modafinil (PROVIGIL) 200 MG tablet TAKE ONE TABLET BY MOUTH ONCE DAILY 30 tablet 5   • oxyCODONE-acetaminophen (PERCOCET)  5-325 MG per tablet Take 1 tablet by mouth Every 4 (Four) Hours As Needed for Moderate Pain . 120 tablet 0   • PredniSONE (DELTASONE) 10 MG (48) tablet pack Medrol dose pack , take as directed 1 each 0   • sildenafil (VIAGRA) 100 MG tablet 1/2 to 1 tablet 30 min up to 4 hours before sex. 5 tablet 11   • umeclidinium-vilanterol (ANORO ELLIPTA) 62.5-25 MCG/INH aerosol powder  inhaler Inhale 1 puff Daily. 1 each 11     Current Facility-Administered Medications   Medication Dose Route Frequency Provider Last Rate Last Dose   • triamcinolone acetonide (KENALOG-40) injection 80 mg  80 mg Intramuscular Once Thomas Pierson MD            The following portions of the patient's history were reviewed and updated as appropriate: allergies, current medications, past family history, past medical history, past social history, past surgical history and problem list.        Assessment/Plan   Fuda was seen today for discuss meds.    Diagnoses and all orders for this visit:    End of life care    Panlobular emphysema (CMS/HCC)  -     triamcinolone acetonide (KENALOG-40) injection 80 mg; Inject 2 mL into the appropriate muscle as directed by prescriber 1 (One) Time.  -     Ambulatory Referral to Home Health    Other orders  -     Discontinue: LORazepam (ATIVAN) 0.5 MG tablet; Take 1 tablet by mouth Every 6 (Six) Hours As Needed for Anxiety.  -     Discontinue: oxyCODONE-acetaminophen (PERCOCET) 5-325 MG per tablet; Take 1 tablet by mouth Every 4 (Four) Hours As Needed for Moderate Pain .  -     oxyCODONE-acetaminophen (PERCOCET) 5-325 MG per tablet; Take 1 tablet by mouth Every 4 (Four) Hours As Needed for Moderate Pain .  -     LORazepam (ATIVAN) 0.5 MG tablet; Take 1 tablet by mouth Every 6 (Six) Hours As Needed for Anxiety.      Call if any problems.   Encouraged hospice care.  They want home health 3 times weekly to help with bathing.     Naresh:  [id number] ok             No Follow-up on file.                  This document has  been electronically signed by Thomas Pierson MD on August 23, 2018 2:34 PM
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
## 2018-08-28 NOTE — TELEPHONE ENCOUNTER
PATIENT'S DAUGHTER, JOHN DELGADO, CALLED AND SAID PATIENT IS DOING BETTER TODAY.  THE MEDICINE IS WORKING WELL, AND SHE APPRECIATES IT.

## 2018-09-10 NOTE — PROGRESS NOTES
Subjective   Fuad Brumfield is a 78 y.o. male.     History of Present Illness     Complex situation.  Mr brumfield is full of life and not resting.  He is wanting sex from his new wife every night.  He denies this.  He is up walking all night.  He wont use his walker and falls.  His new wife is unable to lift him up and she is going to leave because she cant help him.  His daughter is with him.  She doesn't know what to do with him.  She says dr daniels told him he had lung cancer in both lungs, large tumors and was going to die.  He was on hospice for a time, got tired of sleeping all the time on pain and anxiety medicines.    He says he is not hurting  He is not eating much, no bm in 3 days.   He is still taking provigil daily which I thought had run out and he was finished.   I gave him low dose seroquel and I was told it was a miracle for him, now they say he stayed up 26 hours? And it did the opposite.  He can have clear conversation, he keeps up with the conversation and seems to understand everthing.  He understands his wife cant care for him.  He would like to go to assisted living at the [facility].   He does not want hospice.      Review of Systems   Constitutional: Negative for chills, fatigue and fever.   HENT: Negative for congestion, ear discharge, ear pain, facial swelling, hearing loss, postnasal drip, rhinorrhea, sinus pressure, sore throat, trouble swallowing and voice change.    Eyes: Negative for discharge, redness and visual disturbance.   Respiratory: Negative for cough, chest tightness, shortness of breath and wheezing.    Cardiovascular: Negative for chest pain and palpitations.   Gastrointestinal: Negative for abdominal pain, blood in stool, constipation, diarrhea, nausea and vomiting.   Endocrine: Negative for polydipsia and polyuria.   Genitourinary: Negative for dysuria, flank pain, hematuria and urgency.   Musculoskeletal: Negative for arthralgias, back pain, joint swelling and myalgias.    Skin: Negative for rash.   Neurological: Negative for dizziness, weakness, numbness and headaches.   Hematological: Negative for adenopathy.   Psychiatric/Behavioral: Negative for confusion and sleep disturbance. The patient is not nervous/anxious.            /78 (BP Location: Left arm, Patient Position: Sitting, Cuff Size: Adult)   Pulse 99   Temp 99 °F (37.2 °C) (Temporal Artery )   SpO2 93%       Objective     Physical Exam   Constitutional: He is oriented to person, place, and time. He appears well-developed and well-nourished.   HENT:   Head: Normocephalic and atraumatic.   Right Ear: External ear normal.   Left Ear: External ear normal.   Nose: Nose normal.   Eyes: Pupils are equal, round, and reactive to light. Conjunctivae and EOM are normal.   Neck: Normal range of motion.   Pulmonary/Chest: Effort normal.   Musculoskeletal: Normal range of motion.   Neurological: He is alert and oriented to person, place, and time.   Psychiatric: He has a normal mood and affect. His behavior is normal. Judgment and thought content normal.   He seems fine.  I did no formal cognitive testing.  I don't think he would allow this today.    Nursing note and vitals reviewed.          PAST MEDICAL HISTORY     Past Medical History:   Diagnosis Date   • Allergic rhinitis    • Allergic rhinitis due to pollen    • Anxiety state    • Atopic dermatitis    • Backache    • Chronic obstructive lung disease (CMS/HCC)    • Common cold    • Constipation    • Contact dermatitis    • Cough    • Diarrhea    • Drug therapy     Long-term drug therapy     • Dyspnea    • Exanthematous disorder    • Hypoxia    • Itch of skin    • Malaise and fatigue     Other malaise and fatigue     • Male erectile disorder    • Nocturia     finding     • Osteoarthritis    • Pain in joint, ankle and foot    • Rash     O/E - a rash    • Screening for malignant neoplasm of prostate    • Upper respiratory infection       PAST SURGICAL HISTORY     Past  Surgical History:   Procedure Laterality Date   • CATARACT EXTRACTION Left 12/09/2003    Cataract removal (1)   - Left eye   • INJECTION OF MEDICATION  09/09/2015    Kenalog (11) - EDA Pierson      SOCIAL HISTORY     Social History     Social History   • Marital status:      Social History Main Topics   • Smoking status: Former Smoker   • Smokeless tobacco: Former User   • Alcohol use No   • Drug use: No   • Sexual activity: Defer     Other Topics Concern   • Not on file      ALLERGIES   Patient has no known allergies.   MEDICATIONS     Current Outpatient Prescriptions   Medication Sig Dispense Refill   • ammonium lactate (LAC-HYDRIN) 12 % lotion Apply  topically. ammonium lactate 12 % lotion  -  Apply as directed 2 times per day as needed.      • busPIRone (BUSPAR) 5 MG tablet Take 1 tablet by mouth 3 (Three) Times a Day. 90 tablet 5   • HYDROcodone-acetaminophen (NORCO) 5-325 MG per tablet Take 1 tablet by mouth Every 6 (Six) Hours As Needed for Moderate Pain . 100 tablet 0   • hydrOXYzine (ATARAX) 25 MG tablet Take 1 tablet by mouth Every 6 (Six) Hours As Needed for Itching. 120 tablet 11   • megestrol (MEGACE ORAL) 40 MG/ML suspension Take 10 mL by mouth Daily. 300 mL 2   • modafinil (PROVIGIL) 200 MG tablet TAKE ONE TABLET BY MOUTH ONCE DAILY 30 tablet 5   • traZODone (DESYREL) 50 MG tablet Take 1 tablet by mouth Every Night. 30 tablet 5   • acetaminophen (TYLENOL) 325 MG tablet Take 2 tablets by mouth Every 4 (Four) Hours As Needed for Mild Pain . 30 tablet 0   • albuterol (PROVENTIL) (2.5 MG/3ML) 0.083% nebulizer solution Take 2.5 mg by nebulization 4 (Four) Times a Day. Inhale via nebulizer four times daily as needed. 300 mL 12   • cyproheptadine (PERIACTIN) 4 MG tablet Take 1 tablet by mouth 3 (Three) Times a Day As Needed for Allergies. 40 tablet 1   • famotidine (PEPCID) 40 MG tablet Take 1 tablet by mouth Daily. 30 tablet 11   • fluticasone (FLONASE) 50 MCG/ACT nasal spray 2 sprays into each nostril  Daily. 1 bottle 11   • ipratropium (ATROVENT) 0.02 % nebulizer solution Take 2.5 mL by nebulization 4 (Four) Times a Day. 300 mL 12   • ipratropium-albuterol (DUO-NEB) 0.5-2.5 mg/3 ml nebulizer Take 3 mL by nebulization Every 4 (Four) Hours As Needed for Wheezing. 360 mL 0   • loratadine (CLARITIN) 10 MG tablet Take 1 tablet by mouth Daily. 30 tablet 11   • methylphenidate (RITALIN) 10 MG tablet Take  by mouth. Take 1 or 2 tablets daily.     • nystatin (MYCOSTATIN) 874802 UNIT/ML suspension Swish and swallow 5 mL 4 (Four) Times a Day. Swish for 30 seconds then swallow 240 mL 5   • oxyCODONE-acetaminophen (PERCOCET) 5-325 MG per tablet Take 1 tablet by mouth Every 4 (Four) Hours As Needed for Moderate Pain . 120 tablet 0   • PredniSONE (DELTASONE) 10 MG (48) tablet pack Medrol dose pack , take as directed 1 each 0   • sildenafil (VIAGRA) 100 MG tablet 1/2 to 1 tablet 30 min up to 4 hours before sex. 5 tablet 11   • traMADol (ULTRAM) 50 MG tablet Take 1 tablet by mouth Every 6 (Six) Hours As Needed for Moderate Pain (4-6). 120 tablet 2   • umeclidinium-vilanterol (ANORO ELLIPTA) 62.5-25 MCG/INH aerosol powder  inhaler Inhale 1 puff Daily. 1 each 11     No current facility-administered medications for this visit.         The following portions of the patient's history were reviewed and updated as appropriate: allergies, current medications, past family history, past medical history, past social history, past surgical history and problem list.        Assessment/Plan   Fuad was seen today for fall and insomnia.    Diagnoses and all orders for this visit:    Panlobular emphysema (CMS/HCC)  -     Ambulatory Referral to Pulmonology    Altered mental status, unspecified altered mental status type    I told hm he looks very good, and when he had seen dr yenni daniels he was close to the grave and looked horrible.  He is full of energy now.  He should see yenni daniels again and see if tissue dx of lungs is still not a good  option.  I suggested just waiting 6 months.  If he has cancer he would not do anything.  I would just get another ct in 6 months.  His daughter wants it confirmed if has cancer or not.      He is not sleeping.  I suggested he stop the provigil.  Try the seroquel again.  I suggested if he is acting differently wanting sex all of the time, he may have lewy body dementia, difficult to treat and antipsychotics like the seroquel could indeed do opposite of what it usually does.   We are trying very low dose which is usually tolerated and helpful from my experience.   I offered to give him a depoprovera shot to curb his desire for sex.  He denies he is wanting sex nightly as reported by his wife.  He doesn't want a shot. His wife and daughter wants him to have the shot.     I told him he could go to er for further evaluation.  im not sure if would be admitted.    I believe his hospitalization from July may still help him get into a nursing home if desired.  He is to pick out a nursing home and call the  for assistance.   They can call me.  I told them I only go to [facility].     Some pain meds keep people up as well.  Will keep this in consideration also.  I think? He had problem with percocet.                       No Follow-up on file.                  This document has been electronically signed by Thomas Pierson MD on September 10, 2018 3:22 PM

## 2018-09-12 NOTE — TELEPHONE ENCOUNTER
----- Message from Anuradha Marquez sent at 9/12/2018 12:30 PM CDT -----  Contact: [phone]  msg left on v/m @ 12:08    He has changed his mind about the bx. He is ready to schedule due to being in a lot of pain

## 2018-09-13 NOTE — PROGRESS NOTES
"This gentleman has advanced COPD and lung mass suspicious for carcinoma.  He is not in any physical condition to tolerate any diagnostic or therapeutic procedures for this lung mass.  He remained short of breath he is quite nervous about it.  He does produce discolored sputum on occasion    ROS    Constitutional-no night sweats weight loss headaches  GI no abdominal pain nausea or diarrhea  Neuro no seizure or neurologic deficits  Musculoskeletal no deformity or joint pain   no dysuria or hematuria  Skin no rash or other lesions  All other systems reviewed and were negative except for the above.      Physical Exam  /68 (BP Location: Left arm, Patient Position: Sitting, Cuff Size: Adult)   Pulse 58   Ht 182.9 cm (72\")   Wt 54.9 kg (121 lb)   SpO2 93% Comment: 2 L OF O2  BMI 16.41 kg/m²   Vital signs as above  Pupils equally round and reactive to light and accommodation, neck no JVD or adenopathy.  Cardiovascular regular rhythm and rate no murmur or gallop.  Abdomen soft no organomegaly tenderness.  Extremities no clubbing cyanosis or edema.  No cervical adenopathy.  No skin rash.  Neurologic good strength bilaterally without deficits  Chronically ill-appearing dyspneic white male lungs reveal wheezes and rhonchi bilaterally    Impression COPD exacerbation, lung mass suspicious for carcinoma    Plan Depo-Medrol 2 cc IM, hydroxyzine as needed, amoxicillin, continue neb treatments, return as needed        This document has been produced with the assistance of Dragon dictation  This document has been electronically signed by Indio Larsen MD on September 13, 2018 2:57 PM      "

## 2018-10-05 NOTE — PROGRESS NOTES
"This gentleman has end-stage COPD, lung mass suspicious for carcinoma, general debility, severe hypoxemia.  He complains of cough color sputum and increasing shortness of breath for 1 week he's also been falling frequently.  He has not been using his oxygen all the time.  He denies chest pain or hemoptysis    ROS    Constitutional-no night sweats weight loss headaches  GI no abdominal pain nausea or diarrhea  Neuro no seizure or neurologic deficits  Musculoskeletal no deformity or joint pain   no dysuria or hematuria  Skin no rash or other lesions  All other systems reviewed and were negative except for the above.      Physical Exam  /87 (BP Location: Left arm, Patient Position: Sitting, Cuff Size: Adult)   Pulse 90   Ht 182.9 cm (72\")   Wt 51.4 kg (113 lb 4.8 oz)   SpO2 (!) 79%   BMI 15.37 kg/m²   Vital signs as above  Pupils equally round and reactive to light and accommodation, neck no JVD or adenopathy.  Cardiovascular regular rhythm and rate no murmur or gallop.  Abdomen soft no organomegaly tenderness.  Extremities no clubbing cyanosis or edema.  No cervical adenopathy.  No skin rash.  Neurologic good strength bilaterally without deficits  Lungs reveal diminished breath sounds with rhonchi, patient has evidence of marked weight loss and dyspnea.  His O2 saturation is 92% on 2 L nasal but was 79% on room air    Impression COPD exacerbation, general debility, lung mass suspicious for malignancy, hypoxemia    Plan Depo-Medrol, doxycycline, prednisone 20, return in 2 weeks.  If he does not get better with suggested visit to the emergency room.  He may require a nursing home if he gets any weaker        This document has been produced with the assistance of [employer]  This document has been electronically signed by Indio Larsen MD on October 5, 2018 3:00 PM      "

## 2018-10-19 NOTE — PROGRESS NOTES
"This gentleman has advanced COPD with chronic hypoxic respiratory failure and a lung mass suspicious for malignancy.  His breathing has improved his appetite has improved he does have some pedal edema.  He has dyspnea on minimal exertion but denies chest pain or hemoptysis    ROS    Constitutional-no night sweats weight loss headaches  GI no abdominal pain nausea or diarrhea  Neuro no seizure or neurologic deficits  Musculoskeletal no deformity or joint pain   no dysuria or hematuria  Skin no rash or other lesions  All other systems reviewed and were negative except for the above.      Physical Exam  /82 (BP Location: Left arm, Patient Position: Sitting, Cuff Size: Adult)   Pulse 88   Ht 167.6 cm (66\")   Wt 54.7 kg (120 lb 8 oz)   SpO2 96% Comment: ON 2 LITER OF O2  BMI 19.45 kg/m²   Vital signs as above  Pupils equally round and reactive to light and accommodation, neck no JVD or adenopathy.  Cardiovascular regular rhythm and rate no murmur or gallop.  Abdomen soft no organomegaly tenderness.  Extremities no clubbing cyanosis or edema.  No cervical adenopathy.  No skin rash.  Neurologic good strength bilaterally without deficits  Chronically ill-appearing dyspneic white male, lungs are clear, extremities 2+ edema    Impression advanced COPD and lung mass, general debility, hypoxemia    Plan prednisone 10 mg a day, Lasix 20 occasionally for pedal edema, continue oxygen and remainder medications, chest x-ray today, return in 1 month        This document has been produced with the assistance of Dragon dictation  This document has been electronically signed by Indio Larsen MD on October 19, 2018 1:11 PM      "

## 2018-10-29 NOTE — PROGRESS NOTES
" Subjective   Fuad Brumfield is a 78 y.o. male.     History of Present Illness     OXYGEN RE CERTIFICATION  Doing well.   Feet swelling despite lasix 20mg qd.  Doesn't improve when he puts his feet up.  He says he tries to put his feet up.  He says he is eating like crazy with the prednisone    LAST NOTE I TOLD HIS DAUGHTER SOME CONCERN FOR LEWY BODY DEMENTIA, I MEANT TO SAY PICKS DISEASE, FRONTOTEMPORAL DEMENTIA.    He has a follow up with dr yenni daniels in November.     He goes to bed at 9pm and wakes up at 4am.     WITH HIS OXYGEN, HE FEELS BETTER.     Review of Systems   Constitutional: Negative for chills, fatigue and fever.   HENT: Negative for congestion, ear discharge, ear pain, facial swelling, hearing loss, postnasal drip, rhinorrhea, sinus pressure, sore throat, trouble swallowing and voice change.    Eyes: Negative for discharge, redness and visual disturbance.   Respiratory: Negative for cough, chest tightness, shortness of breath and wheezing.    Cardiovascular: Negative for chest pain and palpitations.   Gastrointestinal: Negative for abdominal pain, blood in stool, constipation, diarrhea, nausea and vomiting.   Endocrine: Negative for polydipsia and polyuria.   Genitourinary: Positive for urgency. Negative for dysuria, flank pain and hematuria.   Musculoskeletal: Positive for back pain. Negative for arthralgias, joint swelling and myalgias.   Skin: Negative for rash.   Neurological: Negative for dizziness, weakness, numbness and headaches.   Hematological: Negative for adenopathy.   Psychiatric/Behavioral: Positive for confusion and sleep disturbance. The patient is nervous/anxious.            /72 (BP Location: Left arm, Patient Position: Sitting, Cuff Size: Adult)   Pulse 98   Temp 98.5 °F (36.9 °C) (Temporal Artery )   Ht 167.6 cm (65.98\")   Wt 57.4 kg (126 lb 9.6 oz)   SpO2 (!) 88%   BMI 20.44 kg/m²       Objective     Physical Exam   Constitutional: He is oriented to person, place, " and time. He appears well-developed and well-nourished.   FRAIL   HENT:   Head: Normocephalic and atraumatic.   Right Ear: External ear normal.   Left Ear: External ear normal.   Nose: Nose normal.   Eyes: Pupils are equal, round, and reactive to light. Conjunctivae and EOM are normal.   Neck: Normal range of motion.   Pulmonary/Chest: Effort normal.   Musculoskeletal: Normal range of motion.   Neurological: He is alert and oriented to person, place, and time.   Psychiatric: He has a normal mood and affect. His behavior is normal. Judgment and thought content normal.   Nursing note and vitals reviewed.          PAST MEDICAL HISTORY     Past Medical History:   Diagnosis Date   • Allergic rhinitis    • Allergic rhinitis due to pollen    • Anxiety state    • Atopic dermatitis    • Backache    • Chronic obstructive lung disease (CMS/HCC)    • Common cold    • Constipation    • Contact dermatitis    • Cough    • Diarrhea    • Drug therapy     Long-term drug therapy     • Dyspnea    • Exanthematous disorder    • Hypoxia    • Itch of skin    • Malaise and fatigue     Other malaise and fatigue     • Male erectile disorder    • Nocturia     finding     • Osteoarthritis    • Pain in joint, ankle and foot    • Rash     O/E - a rash    • Screening for malignant neoplasm of prostate    • Upper respiratory infection       PAST SURGICAL HISTORY     Past Surgical History:   Procedure Laterality Date   • CATARACT EXTRACTION Left 12/09/2003    Cataract removal (1)   - Left eye   • INJECTION OF MEDICATION  09/09/2015    Kenalog (11) - EDA Pierson      SOCIAL HISTORY     Social History     Social History   • Marital status:      Social History Main Topics   • Smoking status: Former Smoker   • Smokeless tobacco: Former User   • Alcohol use No   • Drug use: No   • Sexual activity: Defer     Other Topics Concern   • Not on file      ALLERGIES   Patient has no known allergies.   MEDICATIONS     Current Outpatient Prescriptions    Medication Sig Dispense Refill   • busPIRone (BUSPAR) 5 MG tablet Take 1 tablet by mouth 3 (Three) Times a Day. 90 tablet 5   • famotidine (PEPCID) 40 MG tablet Take 1 tablet by mouth Daily. 30 tablet 11   • fluticasone (FLONASE) 50 MCG/ACT nasal spray 2 sprays into each nostril Daily. 1 bottle 11   • furosemide (LASIX) 20 MG tablet Take 1 tablet by mouth Daily. As needed for swelling 20 tablet 0   • hydrOXYzine (ATARAX) 25 MG tablet Take 1 tablet by mouth Every 6 (Six) Hours As Needed for Itching. 120 tablet 11   • hydrOXYzine (ATARAX) 25 MG tablet Take 1 tablet by mouth 3 (Three) Times a Day As Needed for Itching. 50 tablet 1   • loratadine (CLARITIN) 10 MG tablet Take 1 tablet by mouth Daily. 30 tablet 11   • PredniSONE (DELTASONE) 10 MG (48) tablet pack Medrol dose pack , take as directed 1 each 0   • predniSONE (DELTASONE) 10 MG tablet Take 1 tablet by mouth Daily for 30 doses. 1 by mouth daily 30 tablet 0   • predniSONE (DELTASONE) 20 MG tablet Take 1 tablet by mouth Daily. 30 tablet 0   • traMADol (ULTRAM) 50 MG tablet Take 1 tablet by mouth Every 6 (Six) Hours As Needed for Moderate Pain . 120 tablet 2   • traZODone (DESYREL) 50 MG tablet Take 1 tablet by mouth Every Night. 30 tablet 5   • acetaminophen (TYLENOL) 325 MG tablet Take 2 tablets by mouth Every 4 (Four) Hours As Needed for Mild Pain . 30 tablet 0   • albuterol (PROVENTIL) (2.5 MG/3ML) 0.083% nebulizer solution Take 2.5 mg by nebulization 4 (Four) Times a Day. Inhale via nebulizer four times daily as needed. 300 mL 12   • ammonium lactate (LAC-HYDRIN) 12 % lotion Apply  topically. ammonium lactate 12 % lotion  -  Apply as directed 2 times per day as needed.      • cyproheptadine (PERIACTIN) 4 MG tablet Take 1 tablet by mouth 3 (Three) Times a Day As Needed for Allergies. 40 tablet 1   • doxycycline (MONODOX) 100 MG capsule 1 dose by mouth twice a day 20 capsule 0   • ipratropium (ATROVENT) 0.02 % nebulizer solution Take 2.5 mL by nebulization 4  (Four) Times a Day. 300 mL 12   • ipratropium-albuterol (DUO-NEB) 0.5-2.5 mg/3 ml nebulizer Take 3 mL by nebulization Every 4 (Four) Hours As Needed for Wheezing. 360 mL 0   • megestrol (MEGACE ORAL) 40 MG/ML suspension Take 10 mL by mouth Daily. 300 mL 2   • methylphenidate (RITALIN) 10 MG tablet Take  by mouth. Take 1 or 2 tablets daily.     • modafinil (PROVIGIL) 200 MG tablet TAKE ONE TABLET BY MOUTH ONCE DAILY 30 tablet 5   • nystatin (MYCOSTATIN) 790149 UNIT/ML suspension Swish and swallow 5 mL 4 (Four) Times a Day. Swish for 30 seconds then swallow 240 mL 5   • oxyCODONE-acetaminophen (PERCOCET) 5-325 MG per tablet Take 1 tablet by mouth Every 4 (Four) Hours As Needed for Moderate Pain . 120 tablet 0   • sildenafil (VIAGRA) 100 MG tablet 1/2 to 1 tablet 30 min up to 4 hours before sex. 5 tablet 11   • umeclidinium-vilanterol (ANORO ELLIPTA) 62.5-25 MCG/INH aerosol powder  inhaler Inhale 1 puff Daily. 1 each 11     No current facility-administered medications for this visit.         The following portions of the patient's history were reviewed and updated as appropriate: allergies, current medications, past family history, past medical history, past social history, past surgical history and problem list.        Assessment/Plan   Fuad was seen today for oxygen recert.    Diagnoses and all orders for this visit:    Panlobular emphysema (CMS/HCC)    Leg edema  -     Basic Metabolic Panel  -     Magnesium    Hypoxia        HIS O2 SAT ROOM AIR AT REST 88%  WITH 2 LMP OXYGEN VIA NC, 97%      Edema due to prednisone.  Try to elevate more.    I don't believe he nor wife strong enough to put on rayshawn hose.    labwork before trying an increase in lasix, bp could handle it but need to make sure potassium ok.             No Follow-up on file.                  This document has been electronically signed by Thomas Pierson MD on October 29, 2018 3:11 PM

## 2018-12-04 NOTE — PROGRESS NOTES
"This gentleman has COPD with hypoxemia.  He has some abnormalities on CT recently which are not seen on routine film he continues to have dyspnea and edema    ROS    Constitutional-no night sweats weight loss headaches  GI no abdominal pain nausea or diarrhea  Neuro no seizure or neurologic deficits  Musculoskeletal no deformity or joint pain   no dysuria or hematuria  Skin no rash or other lesions  All other systems reviewed and were negative except for the above.      Physical Exam  /96   Pulse 109   Ht 167.6 cm (65.98\")   Wt 61.5 kg (135 lb 9.6 oz)   SpO2 96%   BMI 21.90 kg/m²   Vital signs as above  Pupils equally round and reactive to light and accommodation, neck no JVD or adenopathy.  Cardiovascular regular rhythm and rate no murmur or gallop.  Abdomen soft no organomegaly tenderness.  Extremities no clubbing cyanosis or edema.  No cervical adenopathy.  No skin rash.  Neurologic good strength bilaterally without deficits  Lungs reveal diminished breath sounds without wheeze, extremities trace edema    Impression COPD, edema    Plan wean off of prednisone, continue anoro, Lasix 40 as needed for edema, return in 3 months        This document has been produced with the assistance of Dragon dictation  This document has been electronically signed by Indio Larsen MD on December 4, 2018 1:56 PM      "

## 2019-01-01 ENCOUNTER — HOSPITAL ENCOUNTER (INPATIENT)
Facility: HOSPITAL | Age: 79
LOS: 3 days | End: 2019-06-09
Attending: FAMILY MEDICINE | Admitting: FAMILY MEDICINE

## 2019-01-01 ENCOUNTER — OFFICE VISIT (OUTPATIENT)
Dept: PULMONOLOGY | Facility: CLINIC | Age: 79
End: 2019-01-01

## 2019-01-01 ENCOUNTER — APPOINTMENT (OUTPATIENT)
Dept: GENERAL RADIOLOGY | Facility: HOSPITAL | Age: 79
End: 2019-01-01

## 2019-01-01 ENCOUNTER — OFFICE VISIT (OUTPATIENT)
Dept: FAMILY MEDICINE CLINIC | Facility: CLINIC | Age: 79
End: 2019-01-01

## 2019-01-01 ENCOUNTER — HOSPITAL ENCOUNTER (OUTPATIENT)
Facility: HOSPITAL | Age: 79
Setting detail: OBSERVATION
Discharge: HOME HEALTH | End: 2019-05-15
Attending: EMERGENCY MEDICINE | Admitting: HOSPITALIST

## 2019-01-01 ENCOUNTER — OFFICE VISIT (OUTPATIENT)
Dept: PODIATRY | Facility: CLINIC | Age: 79
End: 2019-01-01

## 2019-01-01 ENCOUNTER — TELEPHONE (OUTPATIENT)
Dept: PULMONOLOGY | Facility: CLINIC | Age: 79
End: 2019-01-01

## 2019-01-01 ENCOUNTER — APPOINTMENT (OUTPATIENT)
Dept: CT IMAGING | Facility: HOSPITAL | Age: 79
End: 2019-01-01

## 2019-01-01 ENCOUNTER — TELEPHONE (OUTPATIENT)
Dept: FAMILY MEDICINE CLINIC | Facility: CLINIC | Age: 79
End: 2019-01-01

## 2019-01-01 VITALS
BODY MASS INDEX: 22.21 KG/M2 | OXYGEN SATURATION: 98 % | SYSTOLIC BLOOD PRESSURE: 130 MMHG | DIASTOLIC BLOOD PRESSURE: 80 MMHG | TEMPERATURE: 96.1 F | HEIGHT: 66 IN | WEIGHT: 138.2 LBS | HEART RATE: 74 BPM

## 2019-01-01 VITALS
SYSTOLIC BLOOD PRESSURE: 125 MMHG | HEIGHT: 71 IN | WEIGHT: 129 LBS | HEART RATE: 105 BPM | OXYGEN SATURATION: 95 % | RESPIRATION RATE: 18 BRPM | BODY MASS INDEX: 18.06 KG/M2 | TEMPERATURE: 98 F | DIASTOLIC BLOOD PRESSURE: 61 MMHG

## 2019-01-01 VITALS
SYSTOLIC BLOOD PRESSURE: 142 MMHG | BODY MASS INDEX: 20.79 KG/M2 | HEIGHT: 66 IN | HEART RATE: 112 BPM | OXYGEN SATURATION: 93 % | TEMPERATURE: 96.8 F | DIASTOLIC BLOOD PRESSURE: 72 MMHG | WEIGHT: 129.4 LBS

## 2019-01-01 VITALS
SYSTOLIC BLOOD PRESSURE: 152 MMHG | WEIGHT: 120.7 LBS | BODY MASS INDEX: 16.9 KG/M2 | OXYGEN SATURATION: 96 % | RESPIRATION RATE: 12 BRPM | HEART RATE: 116 BPM | HEIGHT: 71 IN | TEMPERATURE: 97.2 F | DIASTOLIC BLOOD PRESSURE: 81 MMHG

## 2019-01-01 VITALS
DIASTOLIC BLOOD PRESSURE: 70 MMHG | SYSTOLIC BLOOD PRESSURE: 142 MMHG | HEART RATE: 115 BPM | HEIGHT: 66 IN | WEIGHT: 129.6 LBS | BODY MASS INDEX: 20.83 KG/M2 | OXYGEN SATURATION: 94 %

## 2019-01-01 VITALS
HEART RATE: 104 BPM | OXYGEN SATURATION: 85 % | HEIGHT: 71 IN | BODY MASS INDEX: 17.25 KG/M2 | DIASTOLIC BLOOD PRESSURE: 60 MMHG | SYSTOLIC BLOOD PRESSURE: 108 MMHG | WEIGHT: 123.2 LBS | TEMPERATURE: 98.8 F

## 2019-01-01 VITALS
HEART RATE: 101 BPM | WEIGHT: 135 LBS | DIASTOLIC BLOOD PRESSURE: 103 MMHG | SYSTOLIC BLOOD PRESSURE: 172 MMHG | BODY MASS INDEX: 21.69 KG/M2 | HEIGHT: 66 IN | OXYGEN SATURATION: 92 %

## 2019-01-01 VITALS
TEMPERATURE: 98 F | SYSTOLIC BLOOD PRESSURE: 160 MMHG | OXYGEN SATURATION: 95 % | WEIGHT: 120 LBS | HEIGHT: 71 IN | HEART RATE: 94 BPM | BODY MASS INDEX: 16.8 KG/M2 | DIASTOLIC BLOOD PRESSURE: 88 MMHG

## 2019-01-01 VITALS — OXYGEN SATURATION: 99 % | WEIGHT: 129 LBS | HEART RATE: 134 BPM | BODY MASS INDEX: 20.73 KG/M2 | HEIGHT: 66 IN

## 2019-01-01 DIAGNOSIS — R63.0 ANOREXIA: Primary | ICD-10-CM

## 2019-01-01 DIAGNOSIS — R09.02 HYPOXIA: ICD-10-CM

## 2019-01-01 DIAGNOSIS — I71.40 ABDOMINAL AORTIC ANEURYSM (AAA) WITHOUT RUPTURE (HCC): ICD-10-CM

## 2019-01-01 DIAGNOSIS — M77.41 METATARSALGIA OF RIGHT FOOT: Primary | ICD-10-CM

## 2019-01-01 DIAGNOSIS — R06.02 SOB (SHORTNESS OF BREATH): Primary | ICD-10-CM

## 2019-01-01 DIAGNOSIS — Z74.09 IMPAIRED FUNCTIONAL MOBILITY, BALANCE, GAIT, AND ENDURANCE: ICD-10-CM

## 2019-01-01 DIAGNOSIS — J41.8 MIXED SIMPLE AND MUCOPURULENT CHRONIC BRONCHITIS (HCC): Primary | ICD-10-CM

## 2019-01-01 DIAGNOSIS — J96.11 CHRONIC RESPIRATORY FAILURE WITH HYPOXIA (HCC): ICD-10-CM

## 2019-01-01 DIAGNOSIS — R53.83 OTHER FATIGUE: Primary | ICD-10-CM

## 2019-01-01 DIAGNOSIS — R63.0 ANOREXIA: ICD-10-CM

## 2019-01-01 DIAGNOSIS — J42 CHRONIC BRONCHITIS, UNSPECIFIED CHRONIC BRONCHITIS TYPE (HCC): Primary | ICD-10-CM

## 2019-01-01 DIAGNOSIS — R11.2 NON-INTRACTABLE VOMITING WITH NAUSEA, UNSPECIFIED VOMITING TYPE: ICD-10-CM

## 2019-01-01 DIAGNOSIS — Z78.9 IMPAIRED MOBILITY AND ADLS: ICD-10-CM

## 2019-01-01 DIAGNOSIS — M54.9 BACK PAIN, UNSPECIFIED BACK LOCATION, UNSPECIFIED BACK PAIN LATERALITY, UNSPECIFIED CHRONICITY: ICD-10-CM

## 2019-01-01 DIAGNOSIS — J41.8 MIXED SIMPLE AND MUCOPURULENT CHRONIC BRONCHITIS (HCC): ICD-10-CM

## 2019-01-01 DIAGNOSIS — S51.012A SKIN TEAR OF LEFT ELBOW WITHOUT COMPLICATION, INITIAL ENCOUNTER: ICD-10-CM

## 2019-01-01 DIAGNOSIS — N52.9 IMPOTENCE: ICD-10-CM

## 2019-01-01 DIAGNOSIS — M79.671 RIGHT FOOT PAIN: ICD-10-CM

## 2019-01-01 DIAGNOSIS — L29.9 ITCHING: Primary | ICD-10-CM

## 2019-01-01 DIAGNOSIS — R53.1 WEAKNESS: ICD-10-CM

## 2019-01-01 DIAGNOSIS — J18.9 COMMUNITY ACQUIRED PNEUMONIA OF RIGHT LOWER LOBE OF LUNG: ICD-10-CM

## 2019-01-01 DIAGNOSIS — R77.8 ELEVATED TROPONIN: Primary | ICD-10-CM

## 2019-01-01 DIAGNOSIS — M79.673 PAIN OF FOOT, UNSPECIFIED LATERALITY: Primary | ICD-10-CM

## 2019-01-01 DIAGNOSIS — Z74.09 IMPAIRED MOBILITY AND ADLS: ICD-10-CM

## 2019-01-01 DIAGNOSIS — J18.9 PNEUMONIA OF RIGHT LOWER LOBE DUE TO INFECTIOUS ORGANISM: ICD-10-CM

## 2019-01-01 DIAGNOSIS — L89.892 PRESSURE INJURY OF RIGHT FOOT, STAGE 2 (HCC): Primary | ICD-10-CM

## 2019-01-01 LAB
ALBUMIN SERPL-MCNC: 2.9 G/DL (ref 3.5–5.2)
ALBUMIN SERPL-MCNC: 3.7 G/DL (ref 3.5–5.2)
ALBUMIN/GLOB SERPL: 0.8 G/DL
ALBUMIN/GLOB SERPL: 0.9 G/DL
ALP SERPL-CCNC: 83 U/L (ref 39–117)
ALP SERPL-CCNC: 93 U/L (ref 39–117)
ALT SERPL W P-5'-P-CCNC: 17 U/L (ref 1–41)
ALT SERPL W P-5'-P-CCNC: 5 U/L (ref 1–41)
ANION GAP SERPL CALCULATED.3IONS-SCNC: 15 MMOL/L
ANION GAP SERPL CALCULATED.3IONS-SCNC: 15 MMOL/L
ANION GAP SERPL CALCULATED.3IONS-SCNC: 16 MMOL/L
ANION GAP SERPL CALCULATED.3IONS-SCNC: 16 MMOL/L
ARTERIAL PATENCY WRIST A: ABNORMAL
ARTERIAL PATENCY WRIST A: POSITIVE
AST SERPL-CCNC: 16 U/L (ref 1–40)
AST SERPL-CCNC: 20 U/L (ref 1–40)
ATMOSPHERIC PRESS: 742 MMHG
ATMOSPHERIC PRESS: 742 MMHG
B PERT DNA SPEC QL NAA+PROBE: NOT DETECTED
BACTERIA BLD CULT: ABNORMAL
BASE EXCESS BLDA CALC-SCNC: -7.8 MMOL/L (ref 0–2)
BASE EXCESS BLDA CALC-SCNC: -8 MMOL/L (ref 0–2)
BASOPHILS # BLD AUTO: 0.08 10*3/MM3 (ref 0–0.2)
BASOPHILS # BLD AUTO: 0.08 10*3/MM3 (ref 0–0.2)
BASOPHILS # BLD AUTO: 0.1 10*3/MM3 (ref 0–0.2)
BASOPHILS NFR BLD AUTO: 0.5 % (ref 0–1.5)
BASOPHILS NFR BLD AUTO: 0.8 % (ref 0–1.5)
BASOPHILS NFR BLD AUTO: 1 % (ref 0–1.5)
BDY SITE: ABNORMAL
BDY SITE: ABNORMAL
BILIRUB SERPL-MCNC: 0.6 MG/DL (ref 0.2–1.2)
BILIRUB SERPL-MCNC: 0.6 MG/DL (ref 0.2–1.2)
BILIRUB UR QL STRIP: ABNORMAL
BUN BLD-MCNC: 15 MG/DL (ref 8–23)
BUN BLD-MCNC: 18 MG/DL (ref 8–23)
BUN BLD-MCNC: 19 MG/DL (ref 8–23)
BUN BLD-MCNC: 24 MG/DL (ref 8–23)
BUN/CREAT SERPL: 13 (ref 7–25)
BUN/CREAT SERPL: 14.3 (ref 7–25)
BUN/CREAT SERPL: 15 (ref 7–25)
BUN/CREAT SERPL: 18.8 (ref 7–25)
C PNEUM DNA NPH QL NAA+NON-PROBE: NOT DETECTED
CALCIUM SPEC-SCNC: 8.1 MG/DL (ref 8.6–10.5)
CALCIUM SPEC-SCNC: 8.1 MG/DL (ref 8.6–10.5)
CALCIUM SPEC-SCNC: 8.3 MG/DL (ref 8.6–10.5)
CALCIUM SPEC-SCNC: 8.7 MG/DL (ref 8.6–10.5)
CHLORIDE SERPL-SCNC: 103 MMOL/L (ref 98–107)
CHLORIDE SERPL-SCNC: 105 MMOL/L (ref 98–107)
CHLORIDE SERPL-SCNC: 105 MMOL/L (ref 98–107)
CHLORIDE SERPL-SCNC: 99 MMOL/L (ref 98–107)
CLARITY UR: CLEAR
CO2 SERPL-SCNC: 14 MMOL/L (ref 22–29)
CO2 SERPL-SCNC: 18 MMOL/L (ref 22–29)
CO2 SERPL-SCNC: 19 MMOL/L (ref 22–29)
CO2 SERPL-SCNC: 20 MMOL/L (ref 22–29)
COLOR UR: YELLOW
CREAT BLD-MCNC: 1.15 MG/DL (ref 0.76–1.27)
CREAT BLD-MCNC: 1.26 MG/DL (ref 0.76–1.27)
CREAT BLD-MCNC: 1.27 MG/DL (ref 0.76–1.27)
CREAT BLD-MCNC: 1.28 MG/DL (ref 0.76–1.27)
D-LACTATE SERPL-SCNC: 0.8 MMOL/L (ref 0.5–2)
D-LACTATE SERPL-SCNC: 1.4 MMOL/L (ref 0.5–2)
DACRYOCYTES BLD QL SMEAR: ABNORMAL
DEPRECATED RDW RBC AUTO: 41.6 FL (ref 37–54)
DEPRECATED RDW RBC AUTO: 41.9 FL (ref 37–54)
DEPRECATED RDW RBC AUTO: 44.5 FL (ref 37–54)
DEPRECATED RDW RBC AUTO: 45.1 FL (ref 37–54)
EOSINOPHIL # BLD AUTO: 0.15 10*3/MM3 (ref 0–0.4)
EOSINOPHIL # BLD AUTO: 0.34 10*3/MM3 (ref 0–0.4)
EOSINOPHIL # BLD AUTO: 0.36 10*3/MM3 (ref 0–0.4)
EOSINOPHIL NFR BLD AUTO: 0.8 % (ref 0.3–6.2)
EOSINOPHIL NFR BLD AUTO: 3.5 % (ref 0.3–6.2)
EOSINOPHIL NFR BLD AUTO: 4.3 % (ref 0.3–6.2)
EPAP: 6
ERYTHROCYTE [DISTWIDTH] IN BLOOD BY AUTOMATED COUNT: 13.2 % (ref 12.3–15.4)
ERYTHROCYTE [DISTWIDTH] IN BLOOD BY AUTOMATED COUNT: 13.3 % (ref 12.3–15.4)
ERYTHROCYTE [DISTWIDTH] IN BLOOD BY AUTOMATED COUNT: 13.8 % (ref 12.3–15.4)
ERYTHROCYTE [DISTWIDTH] IN BLOOD BY AUTOMATED COUNT: 13.8 % (ref 12.3–15.4)
FERRITIN SERPL-MCNC: 399.2 NG/ML (ref 30–400)
FLUAV H1 2009 PAND RNA NPH QL NAA+PROBE: NOT DETECTED
FLUAV H1 HA GENE NPH QL NAA+PROBE: NOT DETECTED
FLUAV H3 RNA NPH QL NAA+PROBE: NOT DETECTED
FLUAV SUBTYP SPEC NAA+PROBE: NOT DETECTED
FLUBV RNA ISLT QL NAA+PROBE: NOT DETECTED
GFR SERPL CREATININE-BSD FRML MDRD: 54 ML/MIN/1.73
GFR SERPL CREATININE-BSD FRML MDRD: 55 ML/MIN/1.73
GFR SERPL CREATININE-BSD FRML MDRD: 55 ML/MIN/1.73
GFR SERPL CREATININE-BSD FRML MDRD: 62 ML/MIN/1.73
GLOBULIN UR ELPH-MCNC: 3.5 GM/DL
GLOBULIN UR ELPH-MCNC: 3.9 GM/DL
GLUCOSE BLD-MCNC: 112 MG/DL (ref 65–99)
GLUCOSE BLD-MCNC: 133 MG/DL (ref 65–99)
GLUCOSE BLD-MCNC: 214 MG/DL (ref 65–99)
GLUCOSE BLD-MCNC: 79 MG/DL (ref 65–99)
GLUCOSE UR STRIP-MCNC: NEGATIVE MG/DL
HADV DNA SPEC NAA+PROBE: NOT DETECTED
HCO3 BLDA-SCNC: 13.8 MMOL/L (ref 20–26)
HCO3 BLDA-SCNC: 15.4 MMOL/L (ref 20–26)
HCOV 229E RNA SPEC QL NAA+PROBE: NOT DETECTED
HCOV HKU1 RNA SPEC QL NAA+PROBE: NOT DETECTED
HCOV NL63 RNA SPEC QL NAA+PROBE: NOT DETECTED
HCOV OC43 RNA SPEC QL NAA+PROBE: NOT DETECTED
HCT VFR BLD AUTO: 36.7 % (ref 37.5–51)
HCT VFR BLD AUTO: 37.6 % (ref 37.5–51)
HCT VFR BLD AUTO: 38.2 % (ref 37.5–51)
HCT VFR BLD AUTO: 40.7 % (ref 37.5–51)
HGB BLD-MCNC: 12.2 G/DL (ref 13–17.7)
HGB BLD-MCNC: 12.2 G/DL (ref 13–17.7)
HGB BLD-MCNC: 12.8 G/DL (ref 13–17.7)
HGB BLD-MCNC: 13.5 G/DL (ref 13–17.7)
HGB UR QL STRIP.AUTO: NEGATIVE
HMPV RNA NPH QL NAA+NON-PROBE: NOT DETECTED
HOLD SPECIMEN: NORMAL
HOROWITZ INDEX BLD+IHG-RTO: 40 %
HOROWITZ INDEX BLD+IHG-RTO: 50 %
HPIV1 RNA SPEC QL NAA+PROBE: NOT DETECTED
HPIV2 RNA SPEC QL NAA+PROBE: NOT DETECTED
HPIV3 RNA NPH QL NAA+PROBE: NOT DETECTED
HPIV4 P GENE NPH QL NAA+PROBE: NOT DETECTED
IMM GRANULOCYTES # BLD AUTO: 0.1 10*3/MM3 (ref 0–0.05)
IMM GRANULOCYTES # BLD AUTO: 0.17 10*3/MM3 (ref 0–0.05)
IMM GRANULOCYTES # BLD AUTO: 0.29 10*3/MM3 (ref 0–0.05)
IMM GRANULOCYTES NFR BLD AUTO: 1.3 % (ref 0–0.5)
IMM GRANULOCYTES NFR BLD AUTO: 1.5 % (ref 0–0.5)
IMM GRANULOCYTES NFR BLD AUTO: 1.7 % (ref 0–0.5)
INR PPP: 1.07 (ref 0.8–1.2)
IPAP: 12
KETONES UR QL STRIP: ABNORMAL
LARGE PLATELETS: ABNORMAL
LEUKOCYTE ESTERASE UR QL STRIP.AUTO: NEGATIVE
LIPASE SERPL-CCNC: 80 U/L (ref 13–60)
LYMPHOCYTES # BLD AUTO: 1.97 10*3/MM3 (ref 0.7–3.1)
LYMPHOCYTES # BLD AUTO: 2.41 10*3/MM3 (ref 0.7–3.1)
LYMPHOCYTES # BLD AUTO: 2.96 10*3/MM3 (ref 0.7–3.1)
LYMPHOCYTES # BLD MANUAL: 0.3 10*3/MM3 (ref 0.7–3.1)
LYMPHOCYTES NFR BLD AUTO: 10.1 % (ref 19.6–45.3)
LYMPHOCYTES NFR BLD AUTO: 29.1 % (ref 19.6–45.3)
LYMPHOCYTES NFR BLD AUTO: 30.4 % (ref 19.6–45.3)
LYMPHOCYTES NFR BLD MANUAL: 2 % (ref 19.6–45.3)
LYMPHOCYTES NFR BLD MANUAL: 4 % (ref 5–12)
Lab: ABNORMAL
Lab: ABNORMAL
M PNEUMO IGG SER IA-ACNC: NOT DETECTED
MAGNESIUM SERPL-MCNC: 1.6 MG/DL (ref 1.6–2.4)
MCH RBC QN AUTO: 28.9 PG (ref 26.6–33)
MCH RBC QN AUTO: 29.3 PG (ref 26.6–33)
MCHC RBC AUTO-ENTMCNC: 32.4 G/DL (ref 31.5–35.7)
MCHC RBC AUTO-ENTMCNC: 33.2 G/DL (ref 31.5–35.7)
MCHC RBC AUTO-ENTMCNC: 33.2 G/DL (ref 31.5–35.7)
MCHC RBC AUTO-ENTMCNC: 33.5 G/DL (ref 31.5–35.7)
MCV RBC AUTO: 86.2 FL (ref 79–97)
MCV RBC AUTO: 87 FL (ref 79–97)
MCV RBC AUTO: 88.3 FL (ref 79–97)
MCV RBC AUTO: 89.1 FL (ref 79–97)
MODALITY: ABNORMAL
MODALITY: ABNORMAL
MONOCYTES # BLD AUTO: 0.6 10*3/MM3 (ref 0.1–0.9)
MONOCYTES # BLD AUTO: 1.26 10*3/MM3 (ref 0.1–0.9)
MONOCYTES # BLD AUTO: 1.52 10*3/MM3 (ref 0.1–0.9)
MONOCYTES # BLD AUTO: 1.62 10*3/MM3 (ref 0.1–0.9)
MONOCYTES NFR BLD AUTO: 15.9 % (ref 5–12)
MONOCYTES NFR BLD AUTO: 15.9 % (ref 5–12)
MONOCYTES NFR BLD AUTO: 7.8 % (ref 5–12)
NEUTROPHILS # BLD AUTO: 14.01 10*3/MM3 (ref 1.7–7)
NEUTROPHILS # BLD AUTO: 15.39 10*3/MM3 (ref 1.7–7)
NEUTROPHILS # BLD AUTO: 3.73 10*3/MM3 (ref 1.7–7)
NEUTROPHILS # BLD AUTO: 4.97 10*3/MM3 (ref 1.7–7)
NEUTROPHILS NFR BLD AUTO: 47.1 % (ref 42.7–76)
NEUTROPHILS NFR BLD AUTO: 49 % (ref 42.7–76)
NEUTROPHILS NFR BLD AUTO: 79.3 % (ref 42.7–76)
NEUTROPHILS NFR BLD MANUAL: 93 % (ref 42.7–76)
NEUTS BAND NFR BLD MANUAL: 1 % (ref 0–5)
NITRITE UR QL STRIP: NEGATIVE
NRBC BLD AUTO-RTO: 0 /100 WBC (ref 0–0.2)
PCO2 BLDA: 19.9 MM HG (ref 35–45)
PCO2 BLDA: 25.6 MM HG (ref 35–45)
PH BLDA: 7.39 PH UNITS (ref 7.35–7.45)
PH BLDA: 7.45 PH UNITS (ref 7.35–7.45)
PH UR STRIP.AUTO: 5.5 [PH] (ref 5–9)
PLATELET # BLD AUTO: 156 10*3/MM3 (ref 140–450)
PLATELET # BLD AUTO: 207 10*3/MM3 (ref 140–450)
PLATELET # BLD AUTO: 276 10*3/MM3 (ref 140–450)
PLATELET # BLD AUTO: 300 10*3/MM3 (ref 140–450)
PMV BLD AUTO: 10.7 FL (ref 6–12)
PMV BLD AUTO: 11.4 FL (ref 6–12)
PMV BLD AUTO: 11.7 FL (ref 6–12)
PMV BLD AUTO: 12.4 FL (ref 6–12)
PO2 BLDA: 34.3 MM HG (ref 83–108)
PO2 BLDA: 69.4 MM HG (ref 83–108)
POLYCHROMASIA BLD QL SMEAR: ABNORMAL
POTASSIUM BLD-SCNC: 4 MMOL/L (ref 3.5–5.2)
POTASSIUM BLD-SCNC: 4.1 MMOL/L (ref 3.5–5.2)
POTASSIUM BLD-SCNC: 4.4 MMOL/L (ref 3.5–5.2)
POTASSIUM BLD-SCNC: 4.5 MMOL/L (ref 3.5–5.2)
PROCALCITONIN SERPL-MCNC: 0.14 NG/ML (ref 0.1–0.25)
PROT SERPL-MCNC: 6.4 G/DL (ref 6–8.5)
PROT SERPL-MCNC: 7.6 G/DL (ref 6–8.5)
PROT UR QL STRIP: NEGATIVE
PROTHROMBIN TIME: 13.7 SECONDS (ref 11.1–15.3)
RBC # BLD AUTO: 4.22 10*6/MM3 (ref 4.14–5.8)
RBC # BLD AUTO: 4.22 10*6/MM3 (ref 4.14–5.8)
RBC # BLD AUTO: 4.43 10*6/MM3 (ref 4.14–5.8)
RBC # BLD AUTO: 4.61 10*6/MM3 (ref 4.14–5.8)
RHINOVIRUS RNA SPEC NAA+PROBE: DETECTED
RSV RNA NPH QL NAA+NON-PROBE: NOT DETECTED
SAO2 % BLDCOA: 71.8 % (ref 94–99)
SAO2 % BLDCOA: 94.9 % (ref 94–99)
SET MECH RESP RATE: 14
SODIUM BLD-SCNC: 135 MMOL/L (ref 136–145)
SODIUM BLD-SCNC: 135 MMOL/L (ref 136–145)
SODIUM BLD-SCNC: 137 MMOL/L (ref 136–145)
SODIUM BLD-SCNC: 138 MMOL/L (ref 136–145)
SP GR UR STRIP: 1.02 (ref 1–1.03)
T4 FREE SERPL-MCNC: 1.3 NG/DL (ref 0.93–1.7)
TROPONIN T SERPL-MCNC: 0.07 NG/ML (ref 0–0.03)
TSH SERPL DL<=0.05 MIU/L-ACNC: 1.12 MIU/ML (ref 0.27–4.2)
UROBILINOGEN UR QL STRIP: ABNORMAL
VENTILATOR MODE: ABNORMAL
VENTILATOR MODE: ABNORMAL
WBC MORPH BLD: NORMAL
WBC NRBC COR # BLD: 10.16 10*3/MM3 (ref 3.4–10.8)
WBC NRBC COR # BLD: 14.9 10*3/MM3 (ref 3.4–10.8)
WBC NRBC COR # BLD: 19.42 10*3/MM3 (ref 3.4–10.8)
WBC NRBC COR # BLD: 7.92 10*3/MM3 (ref 3.4–10.8)
WHOLE BLOOD HOLD SPECIMEN: NORMAL
WHOLE BLOOD HOLD SPECIMEN: NORMAL

## 2019-01-01 PROCEDURE — 84443 ASSAY THYROID STIM HORMONE: CPT | Performed by: HOSPITALIST

## 2019-01-01 PROCEDURE — 99285 EMERGENCY DEPT VISIT HI MDM: CPT

## 2019-01-01 PROCEDURE — G0378 HOSPITAL OBSERVATION PER HR: HCPCS

## 2019-01-01 PROCEDURE — 99203 OFFICE O/P NEW LOW 30 MIN: CPT | Performed by: PODIATRIST

## 2019-01-01 PROCEDURE — 25010000002 LORAZEPAM PER 2 MG: Performed by: INTERNAL MEDICINE

## 2019-01-01 PROCEDURE — 25010000002 HYDROMORPHONE PER 4 MG: Performed by: INTERNAL MEDICINE

## 2019-01-01 PROCEDURE — 36415 COLL VENOUS BLD VENIPUNCTURE: CPT

## 2019-01-01 PROCEDURE — 25010000002 METHYLPREDNISOLONE PER 125 MG: Performed by: INTERNAL MEDICINE

## 2019-01-01 PROCEDURE — 25010000002 IOPAMIDOL 61 % SOLUTION: Performed by: EMERGENCY MEDICINE

## 2019-01-01 PROCEDURE — 94799 UNLISTED PULMONARY SVC/PX: CPT

## 2019-01-01 PROCEDURE — 97166 OT EVAL MOD COMPLEX 45 MIN: CPT

## 2019-01-01 PROCEDURE — 80048 BASIC METABOLIC PNL TOTAL CA: CPT | Performed by: HOSPITALIST

## 2019-01-01 PROCEDURE — 85025 COMPLETE CBC W/AUTO DIFF WBC: CPT | Performed by: FAMILY MEDICINE

## 2019-01-01 PROCEDURE — 99213 OFFICE O/P EST LOW 20 MIN: CPT | Performed by: INTERNAL MEDICINE

## 2019-01-01 PROCEDURE — 25010000002 MORPHINE PER 10 MG: Performed by: FAMILY MEDICINE

## 2019-01-01 PROCEDURE — 87798 DETECT AGENT NOS DNA AMP: CPT | Performed by: INTERNAL MEDICINE

## 2019-01-01 PROCEDURE — 94660 CPAP INITIATION&MGMT: CPT

## 2019-01-01 PROCEDURE — 73502 X-RAY EXAM HIP UNI 2-3 VIEWS: CPT

## 2019-01-01 PROCEDURE — 84484 ASSAY OF TROPONIN QUANT: CPT | Performed by: EMERGENCY MEDICINE

## 2019-01-01 PROCEDURE — 83735 ASSAY OF MAGNESIUM: CPT | Performed by: HOSPITALIST

## 2019-01-01 PROCEDURE — 87186 SC STD MICRODIL/AGAR DIL: CPT | Performed by: FAMILY MEDICINE

## 2019-01-01 PROCEDURE — 99213 OFFICE O/P EST LOW 20 MIN: CPT | Performed by: FAMILY MEDICINE

## 2019-01-01 PROCEDURE — 96361 HYDRATE IV INFUSION ADD-ON: CPT

## 2019-01-01 PROCEDURE — 25010000002 CEFTRIAXONE PER 250 MG: Performed by: FAMILY MEDICINE

## 2019-01-01 PROCEDURE — 85007 BL SMEAR W/DIFF WBC COUNT: CPT | Performed by: INTERNAL MEDICINE

## 2019-01-01 PROCEDURE — 94640 AIRWAY INHALATION TREATMENT: CPT

## 2019-01-01 PROCEDURE — 83690 ASSAY OF LIPASE: CPT | Performed by: EMERGENCY MEDICINE

## 2019-01-01 PROCEDURE — 97530 THERAPEUTIC ACTIVITIES: CPT

## 2019-01-01 PROCEDURE — 87486 CHLMYD PNEUM DNA AMP PROBE: CPT | Performed by: INTERNAL MEDICINE

## 2019-01-01 PROCEDURE — 25010000002 LORAZEPAM PER 2 MG: Performed by: FAMILY MEDICINE

## 2019-01-01 PROCEDURE — 96372 THER/PROPH/DIAG INJ SC/IM: CPT

## 2019-01-01 PROCEDURE — 85025 COMPLETE CBC W/AUTO DIFF WBC: CPT | Performed by: HOSPITALIST

## 2019-01-01 PROCEDURE — 97116 GAIT TRAINING THERAPY: CPT

## 2019-01-01 PROCEDURE — 36600 WITHDRAWAL OF ARTERIAL BLOOD: CPT

## 2019-01-01 PROCEDURE — 94760 N-INVAS EAR/PLS OXIMETRY 1: CPT

## 2019-01-01 PROCEDURE — 82803 BLOOD GASES ANY COMBINATION: CPT

## 2019-01-01 PROCEDURE — 84439 ASSAY OF FREE THYROXINE: CPT | Performed by: HOSPITALIST

## 2019-01-01 PROCEDURE — 85610 PROTHROMBIN TIME: CPT | Performed by: FAMILY MEDICINE

## 2019-01-01 PROCEDURE — 80053 COMPREHEN METABOLIC PANEL: CPT | Performed by: FAMILY MEDICINE

## 2019-01-01 PROCEDURE — 71045 X-RAY EXAM CHEST 1 VIEW: CPT

## 2019-01-01 PROCEDURE — 83605 ASSAY OF LACTIC ACID: CPT | Performed by: HOSPITALIST

## 2019-01-01 PROCEDURE — 84145 PROCALCITONIN (PCT): CPT | Performed by: HOSPITALIST

## 2019-01-01 PROCEDURE — 85025 COMPLETE CBC W/AUTO DIFF WBC: CPT | Performed by: EMERGENCY MEDICINE

## 2019-01-01 PROCEDURE — 25010000002 ONDANSETRON PER 1 MG: Performed by: EMERGENCY MEDICINE

## 2019-01-01 PROCEDURE — 25010000002 IOPAMIDOL 61 % SOLUTION: Performed by: HOSPITALIST

## 2019-01-01 PROCEDURE — 97162 PT EVAL MOD COMPLEX 30 MIN: CPT

## 2019-01-01 PROCEDURE — 81003 URINALYSIS AUTO W/O SCOPE: CPT | Performed by: EMERGENCY MEDICINE

## 2019-01-01 PROCEDURE — 25010000002 AZITHROMYCIN PER 500 MG: Performed by: INTERNAL MEDICINE

## 2019-01-01 PROCEDURE — 87581 M.PNEUMON DNA AMP PROBE: CPT | Performed by: INTERNAL MEDICINE

## 2019-01-01 PROCEDURE — 83605 ASSAY OF LACTIC ACID: CPT | Performed by: FAMILY MEDICINE

## 2019-01-01 PROCEDURE — 63710000001 PREDNISONE PER 5 MG: Performed by: HOSPITALIST

## 2019-01-01 PROCEDURE — 87077 CULTURE AEROBIC IDENTIFY: CPT | Performed by: FAMILY MEDICINE

## 2019-01-01 PROCEDURE — 25010000002 ENOXAPARIN PER 10 MG: Performed by: INTERNAL MEDICINE

## 2019-01-01 PROCEDURE — 87150 DNA/RNA AMPLIFIED PROBE: CPT | Performed by: FAMILY MEDICINE

## 2019-01-01 PROCEDURE — 74177 CT ABD & PELVIS W/CONTRAST: CPT

## 2019-01-01 PROCEDURE — 96372 THER/PROPH/DIAG INJ SC/IM: CPT | Performed by: FAMILY MEDICINE

## 2019-01-01 PROCEDURE — 36415 COLL VENOUS BLD VENIPUNCTURE: CPT | Performed by: FAMILY MEDICINE

## 2019-01-01 PROCEDURE — 87040 BLOOD CULTURE FOR BACTERIA: CPT | Performed by: FAMILY MEDICINE

## 2019-01-01 PROCEDURE — 99214 OFFICE O/P EST MOD 30 MIN: CPT | Performed by: FAMILY MEDICINE

## 2019-01-01 PROCEDURE — 80053 COMPREHEN METABOLIC PANEL: CPT | Performed by: EMERGENCY MEDICINE

## 2019-01-01 PROCEDURE — 71260 CT THORAX DX C+: CPT

## 2019-01-01 PROCEDURE — 25010000002 HEPARIN (PORCINE) PER 1000 UNITS: Performed by: HOSPITALIST

## 2019-01-01 PROCEDURE — 25010000002 MORPHINE PER 10 MG: Performed by: INTERNAL MEDICINE

## 2019-01-01 PROCEDURE — 93010 ELECTROCARDIOGRAM REPORT: CPT | Performed by: INTERNAL MEDICINE

## 2019-01-01 PROCEDURE — 85025 COMPLETE CBC W/AUTO DIFF WBC: CPT | Performed by: INTERNAL MEDICINE

## 2019-01-01 PROCEDURE — 82728 ASSAY OF FERRITIN: CPT | Performed by: HOSPITALIST

## 2019-01-01 PROCEDURE — 87633 RESP VIRUS 12-25 TARGETS: CPT | Performed by: INTERNAL MEDICINE

## 2019-01-01 PROCEDURE — 96374 THER/PROPH/DIAG INJ IV PUSH: CPT

## 2019-01-01 PROCEDURE — 80048 BASIC METABOLIC PNL TOTAL CA: CPT | Performed by: INTERNAL MEDICINE

## 2019-01-01 PROCEDURE — 93005 ELECTROCARDIOGRAM TRACING: CPT | Performed by: EMERGENCY MEDICINE

## 2019-01-01 RX ORDER — SODIUM CHLORIDE 0.9 % (FLUSH) 0.9 %
3-10 SYRINGE (ML) INJECTION AS NEEDED
Status: DISCONTINUED | OUTPATIENT
Start: 2019-01-01 | End: 2019-01-01 | Stop reason: HOSPADM

## 2019-01-01 RX ORDER — ACETAMINOPHEN 325 MG/1
650 TABLET ORAL EVERY 4 HOURS PRN
Status: DISCONTINUED | OUTPATIENT
Start: 2019-01-01 | End: 2019-01-01 | Stop reason: HOSPADM

## 2019-01-01 RX ORDER — FUROSEMIDE 40 MG/1
40 TABLET ORAL DAILY
Status: DISCONTINUED | OUTPATIENT
Start: 2019-01-01 | End: 2019-01-01 | Stop reason: HOSPADM

## 2019-01-01 RX ORDER — IPRATROPIUM BROMIDE AND ALBUTEROL SULFATE 2.5; .5 MG/3ML; MG/3ML
3 SOLUTION RESPIRATORY (INHALATION)
Status: DISCONTINUED | OUTPATIENT
Start: 2019-01-01 | End: 2019-01-01 | Stop reason: HOSPADM

## 2019-01-01 RX ORDER — IPRATROPIUM BROMIDE AND ALBUTEROL SULFATE 2.5; .5 MG/3ML; MG/3ML
3 SOLUTION RESPIRATORY (INHALATION) EVERY 4 HOURS PRN
Status: DISCONTINUED | OUTPATIENT
Start: 2019-01-01 | End: 2019-01-01 | Stop reason: HOSPADM

## 2019-01-01 RX ORDER — HALOPERIDOL 5 MG/ML
2 INJECTION INTRAMUSCULAR EVERY 4 HOURS PRN
Status: DISCONTINUED | OUTPATIENT
Start: 2019-01-01 | End: 2019-01-01 | Stop reason: HOSPADM

## 2019-01-01 RX ORDER — DRONABINOL 2.5 MG/1
2.5 CAPSULE ORAL
Qty: 60 CAPSULE | Refills: 5 | Status: SHIPPED | OUTPATIENT
Start: 2019-01-01

## 2019-01-01 RX ORDER — LORAZEPAM 2 MG/ML
0.5 CONCENTRATE ORAL
Status: DISCONTINUED | OUTPATIENT
Start: 2019-01-01 | End: 2019-01-01 | Stop reason: HOSPADM

## 2019-01-01 RX ORDER — NAPROXEN 375 MG/1
375 TABLET ORAL 2 TIMES DAILY PRN
Qty: 60 TABLET | Refills: 1 | Status: ON HOLD | OUTPATIENT
Start: 2019-01-01 | End: 2019-01-01

## 2019-01-01 RX ORDER — GLYCOPYRROLATE 0.2 MG/ML
0.4 INJECTION INTRAMUSCULAR; INTRAVENOUS
Status: DISCONTINUED | OUTPATIENT
Start: 2019-01-01 | End: 2019-01-01 | Stop reason: RX

## 2019-01-01 RX ORDER — DIPHENHYDRAMINE HYDROCHLORIDE 50 MG/ML
25 INJECTION INTRAMUSCULAR; INTRAVENOUS EVERY 6 HOURS PRN
Status: DISCONTINUED | OUTPATIENT
Start: 2019-01-01 | End: 2019-01-01 | Stop reason: HOSPADM

## 2019-01-01 RX ORDER — BISACODYL 10 MG
10 SUPPOSITORY, RECTAL RECTAL DAILY PRN
Status: DISCONTINUED | OUTPATIENT
Start: 2019-01-01 | End: 2019-01-01

## 2019-01-01 RX ORDER — MEGESTROL ACETATE 40 MG/ML
400 SUSPENSION ORAL DAILY
Status: DISCONTINUED | OUTPATIENT
Start: 2019-01-01 | End: 2019-01-01 | Stop reason: HOSPADM

## 2019-01-01 RX ORDER — OXYCODONE HYDROCHLORIDE AND ACETAMINOPHEN 5; 325 MG/1; MG/1
1 TABLET ORAL EVERY 4 HOURS PRN
Status: DISCONTINUED | OUTPATIENT
Start: 2019-01-01 | End: 2019-01-01 | Stop reason: HOSPADM

## 2019-01-01 RX ORDER — LORAZEPAM 2 MG/ML
2 CONCENTRATE ORAL
Status: DISCONTINUED | OUTPATIENT
Start: 2019-01-01 | End: 2019-01-01

## 2019-01-01 RX ORDER — LORAZEPAM 2 MG/ML
1 INJECTION INTRAMUSCULAR
Status: DISCONTINUED | OUTPATIENT
Start: 2019-01-01 | End: 2019-01-01 | Stop reason: HOSPADM

## 2019-01-01 RX ORDER — PROMETHAZINE HYDROCHLORIDE 12.5 MG/1
12.5 TABLET ORAL EVERY 4 HOURS PRN
Status: DISCONTINUED | OUTPATIENT
Start: 2019-01-01 | End: 2019-01-01

## 2019-01-01 RX ORDER — SODIUM CHLORIDE 0.9 % (FLUSH) 0.9 %
10 SYRINGE (ML) INJECTION AS NEEDED
Status: DISCONTINUED | OUTPATIENT
Start: 2019-01-01 | End: 2019-01-01 | Stop reason: HOSPADM

## 2019-01-01 RX ORDER — LORAZEPAM 2 MG/ML
1 CONCENTRATE ORAL
Status: DISCONTINUED | OUTPATIENT
Start: 2019-01-01 | End: 2019-01-01 | Stop reason: HOSPADM

## 2019-01-01 RX ORDER — DOXYCYCLINE 100 MG/1
CAPSULE ORAL
Qty: 20 CAPSULE | Refills: 2 | Status: SHIPPED | OUTPATIENT
Start: 2019-01-01 | End: 2019-01-01

## 2019-01-01 RX ORDER — ONDANSETRON 2 MG/ML
4 INJECTION INTRAMUSCULAR; INTRAVENOUS EVERY 6 HOURS PRN
Status: DISCONTINUED | OUTPATIENT
Start: 2019-01-01 | End: 2019-01-01

## 2019-01-01 RX ORDER — BISACODYL 5 MG/1
5 TABLET, DELAYED RELEASE ORAL DAILY PRN
Status: DISCONTINUED | OUTPATIENT
Start: 2019-01-01 | End: 2019-01-01 | Stop reason: HOSPADM

## 2019-01-01 RX ORDER — METHYLPHENIDATE HYDROCHLORIDE 5 MG/1
5 TABLET ORAL DAILY
Status: DISCONTINUED | OUTPATIENT
Start: 2019-01-01 | End: 2019-01-01

## 2019-01-01 RX ORDER — METHYLPREDNISOLONE SODIUM SUCCINATE 125 MG/2ML
60 INJECTION, POWDER, LYOPHILIZED, FOR SOLUTION INTRAMUSCULAR; INTRAVENOUS EVERY 8 HOURS
Status: DISCONTINUED | OUTPATIENT
Start: 2019-01-01 | End: 2019-01-01

## 2019-01-01 RX ORDER — TRAMADOL HYDROCHLORIDE 50 MG/1
50 TABLET ORAL EVERY 6 HOURS PRN
Status: DISCONTINUED | OUTPATIENT
Start: 2019-01-01 | End: 2019-01-01 | Stop reason: HOSPADM

## 2019-01-01 RX ORDER — SODIUM CHLORIDE 0.9 % (FLUSH) 0.9 %
3 SYRINGE (ML) INJECTION EVERY 12 HOURS SCHEDULED
Status: DISCONTINUED | OUTPATIENT
Start: 2019-01-01 | End: 2019-01-01 | Stop reason: HOSPADM

## 2019-01-01 RX ORDER — BUSPIRONE HYDROCHLORIDE 5 MG/1
5 TABLET ORAL 3 TIMES DAILY
Status: DISCONTINUED | OUTPATIENT
Start: 2019-01-01 | End: 2019-01-01 | Stop reason: HOSPADM

## 2019-01-01 RX ORDER — ACETAMINOPHEN 160 MG/5ML
650 SOLUTION ORAL EVERY 4 HOURS PRN
Status: DISCONTINUED | OUTPATIENT
Start: 2019-01-01 | End: 2019-01-01

## 2019-01-01 RX ORDER — ACETAMINOPHEN 650 MG/1
650 SUPPOSITORY RECTAL EVERY 4 HOURS PRN
Status: DISCONTINUED | OUTPATIENT
Start: 2019-01-01 | End: 2019-01-01

## 2019-01-01 RX ORDER — PROMETHAZINE HYDROCHLORIDE 12.5 MG/1
12.5 SUPPOSITORY RECTAL EVERY 4 HOURS PRN
Status: DISCONTINUED | OUTPATIENT
Start: 2019-01-01 | End: 2019-01-01 | Stop reason: HOSPADM

## 2019-01-01 RX ORDER — GLYCOPYRROLATE 0.2 MG/ML
0.2 INJECTION INTRAMUSCULAR; INTRAVENOUS
Status: DISCONTINUED | OUTPATIENT
Start: 2019-01-01 | End: 2019-01-01 | Stop reason: RX

## 2019-01-01 RX ORDER — HYDROMORPHONE HCL 110MG/55ML
2 PATIENT CONTROLLED ANALGESIA SYRINGE INTRAVENOUS
Status: DISCONTINUED | OUTPATIENT
Start: 2019-01-01 | End: 2019-01-01 | Stop reason: HOSPADM

## 2019-01-01 RX ORDER — HYDROXYZINE HYDROCHLORIDE 25 MG/1
25 TABLET, FILM COATED ORAL 3 TIMES DAILY PRN
Qty: 90 TABLET | Refills: 11 | Status: SHIPPED | OUTPATIENT
Start: 2019-01-01 | End: 2019-01-01 | Stop reason: SDUPTHER

## 2019-01-01 RX ORDER — LORAZEPAM 2 MG/ML
2 CONCENTRATE ORAL
Status: DISCONTINUED | OUTPATIENT
Start: 2019-01-01 | End: 2019-01-01 | Stop reason: HOSPADM

## 2019-01-01 RX ORDER — SODIUM CHLORIDE 0.9 % (FLUSH) 0.9 %
10 SYRINGE (ML) INJECTION AS NEEDED
Status: DISCONTINUED | OUTPATIENT
Start: 2019-01-01 | End: 2019-01-01

## 2019-01-01 RX ORDER — HEPARIN SODIUM 5000 [USP'U]/ML
5000 INJECTION, SOLUTION INTRAVENOUS; SUBCUTANEOUS EVERY 8 HOURS SCHEDULED
Status: DISCONTINUED | OUTPATIENT
Start: 2019-01-01 | End: 2019-01-01 | Stop reason: HOSPADM

## 2019-01-01 RX ORDER — AZITHROMYCIN 250 MG/1
TABLET, FILM COATED ORAL
Qty: 6 TABLET | Refills: 0 | Status: SHIPPED | OUTPATIENT
Start: 2019-01-01 | End: 2019-01-01

## 2019-01-01 RX ORDER — CYPROHEPTADINE HYDROCHLORIDE 4 MG/1
4 TABLET ORAL 3 TIMES DAILY PRN
Qty: 40 TABLET | Refills: 1 | Status: SHIPPED | OUTPATIENT
Start: 2019-01-01

## 2019-01-01 RX ORDER — MODAFINIL 200 MG/1
200 TABLET ORAL DAILY
Status: DISCONTINUED | OUTPATIENT
Start: 2019-01-01 | End: 2019-01-01

## 2019-01-01 RX ORDER — TRAMADOL HYDROCHLORIDE 50 MG/1
50 TABLET ORAL EVERY 6 HOURS PRN
Qty: 120 TABLET | Refills: 2 | Status: SHIPPED | OUTPATIENT
Start: 2019-01-01

## 2019-01-01 RX ORDER — LORAZEPAM 0.5 MG/1
0.5 TABLET ORAL
Status: DISCONTINUED | OUTPATIENT
Start: 2019-01-01 | End: 2019-01-01

## 2019-01-01 RX ORDER — HALOPERIDOL 2 MG/ML
2 SOLUTION ORAL EVERY 4 HOURS PRN
Status: DISCONTINUED | OUTPATIENT
Start: 2019-01-01 | End: 2019-01-01

## 2019-01-01 RX ORDER — LORAZEPAM 2 MG/ML
1 INJECTION INTRAMUSCULAR EVERY 6 HOURS PRN
Status: DISCONTINUED | OUTPATIENT
Start: 2019-01-01 | End: 2019-01-01

## 2019-01-01 RX ORDER — LORAZEPAM 2 MG/ML
0.5 INJECTION INTRAMUSCULAR
Status: DISCONTINUED | OUTPATIENT
Start: 2019-01-01 | End: 2019-01-01 | Stop reason: HOSPADM

## 2019-01-01 RX ORDER — BUSPIRONE HYDROCHLORIDE 5 MG/1
5 TABLET ORAL 3 TIMES DAILY
Qty: 90 TABLET | Refills: 5 | Status: SHIPPED | OUTPATIENT
Start: 2019-01-01

## 2019-01-01 RX ORDER — LORAZEPAM 2 MG/1
2 TABLET ORAL
Status: DISCONTINUED | OUTPATIENT
Start: 2019-01-01 | End: 2019-01-01

## 2019-01-01 RX ORDER — LORAZEPAM 2 MG/ML
0.5 INJECTION INTRAMUSCULAR EVERY 4 HOURS PRN
Status: DISCONTINUED | OUTPATIENT
Start: 2019-01-01 | End: 2019-01-01

## 2019-01-01 RX ORDER — HYDROXYZINE HYDROCHLORIDE 25 MG/1
25 TABLET, FILM COATED ORAL EVERY 6 HOURS PRN
Status: DISCONTINUED | OUTPATIENT
Start: 2019-01-01 | End: 2019-01-01 | Stop reason: HOSPADM

## 2019-01-01 RX ORDER — ASPIRIN 325 MG
325 TABLET ORAL ONCE
Status: COMPLETED | OUTPATIENT
Start: 2019-01-01 | End: 2019-01-01

## 2019-01-01 RX ORDER — HALOPERIDOL 1 MG/1
2 TABLET ORAL EVERY 4 HOURS PRN
Status: DISCONTINUED | OUTPATIENT
Start: 2019-01-01 | End: 2019-01-01

## 2019-01-01 RX ORDER — PREDNISONE 10 MG/1
10 TABLET ORAL DAILY
Qty: 7 TABLET | Refills: 0 | Status: SHIPPED | OUTPATIENT
Start: 2019-01-01 | End: 2019-01-01

## 2019-01-01 RX ORDER — MORPHINE SULFATE 2 MG/ML
2 INJECTION, SOLUTION INTRAMUSCULAR; INTRAVENOUS
Status: DISCONTINUED | OUTPATIENT
Start: 2019-01-01 | End: 2019-01-01

## 2019-01-01 RX ORDER — TRIAMCINOLONE ACETONIDE 40 MG/ML
80 INJECTION, SUSPENSION INTRA-ARTICULAR; INTRAMUSCULAR ONCE
Status: COMPLETED | OUTPATIENT
Start: 2019-01-01 | End: 2019-01-01

## 2019-01-01 RX ORDER — LORAZEPAM 2 MG/ML
1 CONCENTRATE ORAL
Status: DISCONTINUED | OUTPATIENT
Start: 2019-01-01 | End: 2019-01-01

## 2019-01-01 RX ORDER — FAMOTIDINE 20 MG/1
20 TABLET, FILM COATED ORAL DAILY
Status: DISCONTINUED | OUTPATIENT
Start: 2019-01-01 | End: 2019-01-01 | Stop reason: HOSPADM

## 2019-01-01 RX ORDER — IPRATROPIUM BROMIDE AND ALBUTEROL SULFATE 2.5; .5 MG/3ML; MG/3ML
3 SOLUTION RESPIRATORY (INHALATION)
Status: DISCONTINUED | OUTPATIENT
Start: 2019-01-01 | End: 2019-01-01

## 2019-01-01 RX ORDER — NAPROXEN 375 MG/1
375 TABLET ORAL 2 TIMES DAILY PRN
Qty: 60 TABLET | Refills: 1 | Status: SHIPPED | OUTPATIENT
Start: 2019-01-01 | End: 2019-01-01 | Stop reason: SDUPTHER

## 2019-01-01 RX ORDER — ALBUTEROL SULFATE 2.5 MG/3ML
2.5 SOLUTION RESPIRATORY (INHALATION)
Status: DISCONTINUED | OUTPATIENT
Start: 2019-01-01 | End: 2019-01-01 | Stop reason: HOSPADM

## 2019-01-01 RX ORDER — DIPHENHYDRAMINE HCL 25 MG
25 CAPSULE ORAL EVERY 6 HOURS PRN
Status: DISCONTINUED | OUTPATIENT
Start: 2019-01-01 | End: 2019-01-01

## 2019-01-01 RX ORDER — LORAZEPAM 2 MG/ML
2 INJECTION INTRAMUSCULAR
Status: DISCONTINUED | OUTPATIENT
Start: 2019-01-01 | End: 2019-01-01 | Stop reason: HOSPADM

## 2019-01-01 RX ORDER — ATROPINE SULFATE 10 MG/ML
2 SOLUTION/ DROPS OPHTHALMIC 2 TIMES DAILY PRN
Status: DISCONTINUED | OUTPATIENT
Start: 2019-01-01 | End: 2019-01-01 | Stop reason: HOSPADM

## 2019-01-01 RX ORDER — MORPHINE SULFATE 2 MG/ML
2 INJECTION, SOLUTION INTRAMUSCULAR; INTRAVENOUS EVERY 4 HOURS PRN
Status: DISCONTINUED | OUTPATIENT
Start: 2019-01-01 | End: 2019-01-01 | Stop reason: DRUGHIGH

## 2019-01-01 RX ORDER — SODIUM CHLORIDE 0.9 % (FLUSH) 0.9 %
3 SYRINGE (ML) INJECTION EVERY 12 HOURS SCHEDULED
Status: DISCONTINUED | OUTPATIENT
Start: 2019-01-01 | End: 2019-01-01

## 2019-01-01 RX ORDER — NALOXONE HCL 0.4 MG/ML
0.4 VIAL (ML) INJECTION
Status: DISCONTINUED | OUTPATIENT
Start: 2019-01-01 | End: 2019-01-01

## 2019-01-01 RX ORDER — ONDANSETRON 2 MG/ML
4 INJECTION INTRAMUSCULAR; INTRAVENOUS ONCE
Status: COMPLETED | OUTPATIENT
Start: 2019-01-01 | End: 2019-01-01

## 2019-01-01 RX ORDER — LORAZEPAM 1 MG/1
1 TABLET ORAL
Status: DISCONTINUED | OUTPATIENT
Start: 2019-01-01 | End: 2019-01-01

## 2019-01-01 RX ORDER — PREDNISONE 10 MG/1
TABLET ORAL
Qty: 21 TABLET | Refills: 0 | Status: SHIPPED | OUTPATIENT
Start: 2019-01-01 | End: 2019-01-01

## 2019-01-01 RX ORDER — LORAZEPAM 2 MG/ML
1 INJECTION INTRAMUSCULAR ONCE
Status: COMPLETED | OUTPATIENT
Start: 2019-01-01 | End: 2019-01-01

## 2019-01-01 RX ORDER — TRAMADOL HYDROCHLORIDE 50 MG/1
50 TABLET ORAL EVERY 6 HOURS PRN
Qty: 120 TABLET | Refills: 2 | Status: CANCELLED | OUTPATIENT
Start: 2019-01-01

## 2019-01-01 RX ORDER — AMMONIUM LACTATE 12 G/100G
LOTION TOPICAL 2 TIMES DAILY PRN
Status: DISCONTINUED | OUTPATIENT
Start: 2019-01-01 | End: 2019-01-01 | Stop reason: HOSPADM

## 2019-01-01 RX ORDER — POLYVINYL ALCOHOL 14 MG/ML
1 SOLUTION/ DROPS OPHTHALMIC
Status: DISCONTINUED | OUTPATIENT
Start: 2019-01-01 | End: 2019-01-01 | Stop reason: HOSPADM

## 2019-01-01 RX ORDER — PREDNISONE 10 MG/1
10 TABLET ORAL DAILY
Status: DISCONTINUED | OUTPATIENT
Start: 2019-01-01 | End: 2019-01-01 | Stop reason: HOSPADM

## 2019-01-01 RX ORDER — SCOLOPAMINE TRANSDERMAL SYSTEM 1 MG/1
1 PATCH, EXTENDED RELEASE TRANSDERMAL
Status: DISCONTINUED | OUTPATIENT
Start: 2019-01-01 | End: 2019-01-01 | Stop reason: HOSPADM

## 2019-01-01 RX ORDER — SODIUM CHLORIDE 0.9 % (FLUSH) 0.9 %
3-10 SYRINGE (ML) INJECTION AS NEEDED
Status: DISCONTINUED | OUTPATIENT
Start: 2019-01-01 | End: 2019-01-01

## 2019-01-01 RX ORDER — DIPHENOXYLATE HYDROCHLORIDE AND ATROPINE SULFATE 2.5; .025 MG/1; MG/1
1 TABLET ORAL
Status: DISCONTINUED | OUTPATIENT
Start: 2019-01-01 | End: 2019-01-01

## 2019-01-01 RX ORDER — TRAZODONE HYDROCHLORIDE 50 MG/1
50 TABLET ORAL NIGHTLY
Status: DISCONTINUED | OUTPATIENT
Start: 2019-01-01 | End: 2019-01-01 | Stop reason: HOSPADM

## 2019-01-01 RX ORDER — SODIUM CHLORIDE 9 MG/ML
100 INJECTION, SOLUTION INTRAVENOUS CONTINUOUS
Status: DISCONTINUED | OUTPATIENT
Start: 2019-01-01 | End: 2019-01-01

## 2019-01-01 RX ORDER — ACETAMINOPHEN 325 MG/1
650 TABLET ORAL EVERY 4 HOURS PRN
Status: DISCONTINUED | OUTPATIENT
Start: 2019-01-01 | End: 2019-01-01

## 2019-01-01 RX ORDER — MEGESTROL ACETATE 40 MG/ML
400 SUSPENSION ORAL DAILY
Qty: 300 ML | Refills: 2 | Status: SHIPPED | OUTPATIENT
Start: 2019-01-01 | End: 2019-01-01 | Stop reason: SINTOL

## 2019-01-01 RX ORDER — PROMETHAZINE HYDROCHLORIDE 25 MG/ML
12.5 INJECTION, SOLUTION INTRAMUSCULAR; INTRAVENOUS EVERY 4 HOURS PRN
Status: DISCONTINUED | OUTPATIENT
Start: 2019-01-01 | End: 2019-01-01 | Stop reason: HOSPADM

## 2019-01-01 RX ORDER — PROMETHAZINE HYDROCHLORIDE 6.25 MG/5ML
12.5 SYRUP ORAL EVERY 4 HOURS PRN
Status: DISCONTINUED | OUTPATIENT
Start: 2019-01-01 | End: 2019-01-01

## 2019-01-01 RX ORDER — SILDENAFIL CITRATE 20 MG/1
20 TABLET ORAL 3 TIMES DAILY
Status: DISCONTINUED | OUTPATIENT
Start: 2019-01-01 | End: 2019-01-01

## 2019-01-01 RX ORDER — ACETAMINOPHEN 650 MG/1
650 SUPPOSITORY RECTAL EVERY 4 HOURS PRN
Status: DISCONTINUED | OUTPATIENT
Start: 2019-01-01 | End: 2019-01-01 | Stop reason: HOSPADM

## 2019-01-01 RX ORDER — LORAZEPAM 2 MG/ML
0.5 CONCENTRATE ORAL
Status: DISCONTINUED | OUTPATIENT
Start: 2019-01-01 | End: 2019-01-01

## 2019-01-01 RX ORDER — FAMOTIDINE 10 MG/ML
20 INJECTION, SOLUTION INTRAVENOUS DAILY
Status: DISCONTINUED | OUTPATIENT
Start: 2019-01-01 | End: 2019-01-01

## 2019-01-01 RX ORDER — WATER 1000 ML/1000ML
INJECTION, SOLUTION INTRAVENOUS
Status: COMPLETED
Start: 2019-01-01 | End: 2019-01-01

## 2019-01-01 RX ADMIN — FAMOTIDINE 20 MG: 20 TABLET ORAL at 09:41

## 2019-01-01 RX ADMIN — METHYLPREDNISOLONE SODIUM SUCCINATE 60 MG: 125 INJECTION, POWDER, FOR SOLUTION INTRAMUSCULAR; INTRAVENOUS at 05:58

## 2019-01-01 RX ADMIN — IOPAMIDOL 80 ML: 612 INJECTION, SOLUTION INTRAVENOUS at 13:42

## 2019-01-01 RX ADMIN — HEPARIN SODIUM 5000 UNITS: 5000 INJECTION, SOLUTION INTRAVENOUS; SUBCUTANEOUS at 15:46

## 2019-01-01 RX ADMIN — SODIUM CHLORIDE, PRESERVATIVE FREE 3 ML: 5 INJECTION INTRAVENOUS at 21:29

## 2019-01-01 RX ADMIN — HYDROMORPHONE HYDROCHLORIDE 2 MG: 2 INJECTION INTRAMUSCULAR; INTRAVENOUS; SUBCUTANEOUS at 12:16

## 2019-01-01 RX ADMIN — HYDROMORPHONE HYDROCHLORIDE 2 MG: 2 INJECTION INTRAMUSCULAR; INTRAVENOUS; SUBCUTANEOUS at 02:43

## 2019-01-01 RX ADMIN — LORAZEPAM 0.5 MG: 2 INJECTION INTRAMUSCULAR; INTRAVENOUS at 08:33

## 2019-01-01 RX ADMIN — IPRATROPIUM BROMIDE AND ALBUTEROL SULFATE 3 ML: 2.5; .5 SOLUTION RESPIRATORY (INHALATION) at 07:10

## 2019-01-01 RX ADMIN — FAMOTIDINE 20 MG: 10 INJECTION INTRAVENOUS at 08:31

## 2019-01-01 RX ADMIN — SODIUM CHLORIDE, PRESERVATIVE FREE 3 ML: 5 INJECTION INTRAVENOUS at 08:32

## 2019-01-01 RX ADMIN — HYDROMORPHONE HYDROCHLORIDE 2 MG: 2 INJECTION INTRAMUSCULAR; INTRAVENOUS; SUBCUTANEOUS at 11:43

## 2019-01-01 RX ADMIN — MORPHINE SULFATE 4 MG: 4 INJECTION, SOLUTION INTRAMUSCULAR; INTRAVENOUS at 17:28

## 2019-01-01 RX ADMIN — HYDROMORPHONE HYDROCHLORIDE 2 MG: 2 INJECTION INTRAMUSCULAR; INTRAVENOUS; SUBCUTANEOUS at 05:05

## 2019-01-01 RX ADMIN — LORAZEPAM 2 MG: 2 INJECTION INTRAMUSCULAR; INTRAVENOUS at 18:20

## 2019-01-01 RX ADMIN — WATER 10 ML: 1 INJECTION INTRAMUSCULAR; INTRAVENOUS; SUBCUTANEOUS at 22:12

## 2019-01-01 RX ADMIN — SODIUM CHLORIDE, PRESERVATIVE FREE 10 ML: 5 INJECTION INTRAVENOUS at 09:42

## 2019-01-01 RX ADMIN — HYDROMORPHONE HYDROCHLORIDE 2 MG: 2 INJECTION INTRAMUSCULAR; INTRAVENOUS; SUBCUTANEOUS at 18:20

## 2019-01-01 RX ADMIN — LORAZEPAM 1 MG: 2 INJECTION INTRAMUSCULAR; INTRAVENOUS at 12:36

## 2019-01-01 RX ADMIN — LORAZEPAM 2 MG: 2 INJECTION INTRAMUSCULAR; INTRAVENOUS at 05:30

## 2019-01-01 RX ADMIN — IPRATROPIUM BROMIDE AND ALBUTEROL SULFATE 3 ML: 2.5; .5 SOLUTION RESPIRATORY (INHALATION) at 16:55

## 2019-01-01 RX ADMIN — LORAZEPAM 1 MG: 2 INJECTION INTRAMUSCULAR; INTRAVENOUS at 11:43

## 2019-01-01 RX ADMIN — LORAZEPAM 1 MG: 2 INJECTION INTRAMUSCULAR; INTRAVENOUS at 17:29

## 2019-01-01 RX ADMIN — IPRATROPIUM BROMIDE AND ALBUTEROL SULFATE 3 ML: 2.5; .5 SOLUTION RESPIRATORY (INHALATION) at 21:34

## 2019-01-01 RX ADMIN — IPRATROPIUM BROMIDE AND ALBUTEROL SULFATE 3 ML: 2.5; .5 SOLUTION RESPIRATORY (INHALATION) at 11:22

## 2019-01-01 RX ADMIN — PREDNISONE 10 MG: 10 TABLET ORAL at 09:41

## 2019-01-01 RX ADMIN — IPRATROPIUM BROMIDE AND ALBUTEROL SULFATE 3 ML: 2.5; .5 SOLUTION RESPIRATORY (INHALATION) at 15:27

## 2019-01-01 RX ADMIN — BUSPIRONE HYDROCHLORIDE 5 MG: 5 TABLET ORAL at 15:49

## 2019-01-01 RX ADMIN — LORAZEPAM 2 MG: 2 INJECTION INTRAMUSCULAR; INTRAVENOUS at 14:52

## 2019-01-01 RX ADMIN — SODIUM CHLORIDE 100 ML/HR: 9 INJECTION, SOLUTION INTRAVENOUS at 21:29

## 2019-01-01 RX ADMIN — MORPHINE SULFATE 2 MG: 2 INJECTION, SOLUTION INTRAMUSCULAR; INTRAVENOUS at 22:09

## 2019-01-01 RX ADMIN — ONDANSETRON 4 MG: 2 INJECTION INTRAMUSCULAR; INTRAVENOUS at 20:18

## 2019-01-01 RX ADMIN — HYDROMORPHONE HYDROCHLORIDE 2 MG: 2 INJECTION INTRAMUSCULAR; INTRAVENOUS; SUBCUTANEOUS at 14:52

## 2019-01-01 RX ADMIN — HYDROMORPHONE HYDROCHLORIDE 2 MG: 2 INJECTION INTRAMUSCULAR; INTRAVENOUS; SUBCUTANEOUS at 16:01

## 2019-01-01 RX ADMIN — SODIUM CHLORIDE 100 ML/HR: 9 INJECTION, SOLUTION INTRAVENOUS at 08:32

## 2019-01-01 RX ADMIN — MEGESTROL ACETATE 400 MG: 40 SUSPENSION ORAL at 09:41

## 2019-01-01 RX ADMIN — IOPAMIDOL 80 ML: 612 INJECTION, SOLUTION INTRAVENOUS at 22:10

## 2019-01-01 RX ADMIN — SILDENAFIL CITRATE 20 MG: 20 TABLET, FILM COATED ORAL at 09:41

## 2019-01-01 RX ADMIN — AZITHROMYCIN MONOHYDRATE 500 MG: 500 INJECTION, POWDER, LYOPHILIZED, FOR SOLUTION INTRAVENOUS at 21:29

## 2019-01-01 RX ADMIN — HEPARIN SODIUM 5000 UNITS: 5000 INJECTION, SOLUTION INTRAVENOUS; SUBCUTANEOUS at 05:52

## 2019-01-01 RX ADMIN — TRIAMCINOLONE ACETONIDE 80 MG: 40 INJECTION, SUSPENSION INTRA-ARTICULAR; INTRAMUSCULAR at 15:18

## 2019-01-01 RX ADMIN — LORAZEPAM 2 MG: 2 INJECTION INTRAMUSCULAR; INTRAVENOUS at 16:02

## 2019-01-01 RX ADMIN — HYDROMORPHONE HYDROCHLORIDE 2 MG: 2 INJECTION INTRAMUSCULAR; INTRAVENOUS; SUBCUTANEOUS at 20:37

## 2019-01-01 RX ADMIN — FUROSEMIDE 40 MG: 40 TABLET ORAL at 09:41

## 2019-01-01 RX ADMIN — ENOXAPARIN SODIUM 40 MG: 40 INJECTION SUBCUTANEOUS at 21:29

## 2019-01-01 RX ADMIN — ASPIRIN 325 MG: 325 TABLET, COATED ORAL at 21:45

## 2019-01-01 RX ADMIN — IPRATROPIUM BROMIDE AND ALBUTEROL SULFATE 3 ML: 2.5; .5 SOLUTION RESPIRATORY (INHALATION) at 07:18

## 2019-01-01 RX ADMIN — LORAZEPAM 1 MG: 2 INJECTION INTRAMUSCULAR; INTRAVENOUS at 12:16

## 2019-01-01 RX ADMIN — METHYLPREDNISOLONE SODIUM SUCCINATE 60 MG: 125 INJECTION, POWDER, FOR SOLUTION INTRAMUSCULAR; INTRAVENOUS at 21:29

## 2019-01-01 RX ADMIN — BUSPIRONE HYDROCHLORIDE 5 MG: 5 TABLET ORAL at 09:41

## 2019-01-01 RX ADMIN — SODIUM CHLORIDE 1000 ML: 9 INJECTION, SOLUTION INTRAVENOUS at 20:34

## 2019-01-01 RX ADMIN — LORAZEPAM 0.5 MG: 2 INJECTION INTRAMUSCULAR; INTRAVENOUS at 22:12

## 2019-01-01 RX ADMIN — CEFTRIAXONE SODIUM 1 G: 1 INJECTION, POWDER, FOR SOLUTION INTRAMUSCULAR; INTRAVENOUS at 20:00

## 2019-01-08 NOTE — TELEPHONE ENCOUNTER
SENT IN PREDNISONE AND ADVISED PATIENT  ----- Message from Frances Esquivel sent at 1/8/2019 10:21 AM CST -----  Pt is needing a script for prednisone called into [facility] in [facility]

## 2019-01-30 NOTE — PROGRESS NOTES
" Subjective   Fuad Brumfield is a 78 y.o. male.     History of Present Illness     Itching all over.  Just wants a kenalog shot.  This helps.   Feeling fine otherwise.    Review of Systems   Constitutional: Negative for chills, fatigue and fever.   HENT: Negative for congestion, ear discharge, ear pain, facial swelling, hearing loss, postnasal drip, rhinorrhea, sinus pressure, sore throat, trouble swallowing and voice change.    Eyes: Negative for discharge, redness and visual disturbance.   Respiratory: Negative for cough, chest tightness, shortness of breath and wheezing.    Cardiovascular: Negative for chest pain and palpitations.   Gastrointestinal: Negative for abdominal pain, blood in stool, constipation, diarrhea, nausea and vomiting.   Endocrine: Negative for polydipsia and polyuria.   Genitourinary: Negative for dysuria, flank pain, hematuria and urgency.   Musculoskeletal: Negative for arthralgias, back pain, joint swelling and myalgias.   Skin: Negative for rash.   Neurological: Negative for dizziness, weakness, numbness and headaches.   Hematological: Negative for adenopathy.   Psychiatric/Behavioral: Negative for confusion and sleep disturbance. The patient is not nervous/anxious.            /80 (BP Location: Left arm, Patient Position: Sitting, Cuff Size: Adult)   Pulse 74   Temp 96.1 °F (35.6 °C) (Temporal)   Ht 167.6 cm (65.98\")   Wt 62.7 kg (138 lb 3.2 oz)   SpO2 98%   BMI 22.32 kg/m²       Objective     Physical Exam   Constitutional: He is oriented to person, place, and time. He appears well-developed and well-nourished.   HENT:   Head: Normocephalic and atraumatic.   Right Ear: External ear normal.   Left Ear: External ear normal.   Nose: Nose normal.   Eyes: Conjunctivae and EOM are normal. Pupils are equal, round, and reactive to light.   Neck: Normal range of motion.   Pulmonary/Chest: Effort normal.   Musculoskeletal: Normal range of motion.   Neurological: He is alert and " oriented to person, place, and time.   Skin: Skin is dry. No rash noted.   Psychiatric: He has a normal mood and affect. His behavior is normal. Judgment and thought content normal.   Nursing note and vitals reviewed.          PAST MEDICAL HISTORY     Past Medical History:   Diagnosis Date   • Allergic rhinitis    • Allergic rhinitis due to pollen    • Anxiety state    • Atopic dermatitis    • Backache    • Chronic obstructive lung disease (CMS/HCC)    • Common cold    • Constipation    • Contact dermatitis    • Cough    • Diarrhea    • Drug therapy     Long-term drug therapy     • Dyspnea    • Exanthematous disorder    • Hypoxia    • Itch of skin    • Malaise and fatigue     Other malaise and fatigue     • Male erectile disorder    • Nocturia     finding     • Osteoarthritis    • Pain in joint, ankle and foot    • Rash     O/E - a rash    • Screening for malignant neoplasm of prostate    • Upper respiratory infection       PAST SURGICAL HISTORY     Past Surgical History:   Procedure Laterality Date   • CATARACT EXTRACTION Left 12/09/2003    Cataract removal (1)   - Left eye   • INJECTION OF MEDICATION  09/09/2015    Kenalog (11) - EDA Pierson      SOCIAL HISTORY     Social History     Socioeconomic History   • Marital status:      Spouse name: Not on file   • Number of children: Not on file   • Years of education: Not on file   • Highest education level: Not on file   Tobacco Use   • Smoking status: Former Smoker   • Smokeless tobacco: Former User   Substance and Sexual Activity   • Alcohol use: No   • Drug use: No   • Sexual activity: Defer      ALLERGIES   Patient has no known allergies.   MEDICATIONS     Current Outpatient Medications   Medication Sig Dispense Refill   • acetaminophen (TYLENOL) 325 MG tablet Take 2 tablets by mouth Every 4 (Four) Hours As Needed for Mild Pain . 30 tablet 0   • albuterol (PROVENTIL) (2.5 MG/3ML) 0.083% nebulizer solution Take 2.5 mg by nebulization 4 (Four) Times a Day. Inhale  via nebulizer four times daily as needed. 300 mL 12   • ammonium lactate (LAC-HYDRIN) 12 % lotion Apply  topically. ammonium lactate 12 % lotion  -  Apply as directed 2 times per day as needed.      • busPIRone (BUSPAR) 5 MG tablet Take 1 tablet by mouth 3 (Three) Times a Day. 90 tablet 5   • cyproheptadine (PERIACTIN) 4 MG tablet Take 1 tablet by mouth 3 (Three) Times a Day As Needed for Allergies. 40 tablet 1   • doxycycline (MONODOX) 100 MG capsule 1 dose by mouth twice a day 20 capsule 0   • famotidine (PEPCID) 40 MG tablet Take 1 tablet by mouth Daily. 30 tablet 11   • fluticasone (FLONASE) 50 MCG/ACT nasal spray 2 sprays into each nostril Daily. 1 bottle 11   • furosemide (LASIX) 40 MG tablet Take 1 tablet by mouth Daily. 1 tablet daily as needed for swelling 30 tablet 2   • hydrOXYzine (ATARAX) 25 MG tablet Take 1 tablet by mouth Every 6 (Six) Hours As Needed for Itching. 120 tablet 11   • hydrOXYzine (ATARAX) 25 MG tablet Take 1 tablet by mouth 3 (Three) Times a Day As Needed for Itching. 50 tablet 1   • ipratropium (ATROVENT) 0.02 % nebulizer solution Take 2.5 mL by nebulization 4 (Four) Times a Day. 300 mL 12   • ipratropium-albuterol (DUO-NEB) 0.5-2.5 mg/3 ml nebulizer Take 3 mL by nebulization Every 4 (Four) Hours As Needed for Wheezing. 360 mL 0   • loratadine (CLARITIN) 10 MG tablet Take 1 tablet by mouth Daily. 30 tablet 11   • megestrol (MEGACE ORAL) 40 MG/ML suspension Take 10 mL by mouth Daily. 300 mL 2   • methylphenidate (RITALIN) 10 MG tablet Take  by mouth. Take 1 or 2 tablets daily.     • modafinil (PROVIGIL) 200 MG tablet TAKE ONE TABLET BY MOUTH ONCE DAILY 30 tablet 5   • nystatin (MYCOSTATIN) 745586 UNIT/ML suspension Swish and swallow 5 mL 4 (Four) Times a Day. Swish for 30 seconds then swallow 240 mL 5   • oxyCODONE-acetaminophen (PERCOCET) 5-325 MG per tablet Take 1 tablet by mouth Every 4 (Four) Hours As Needed for Moderate Pain . 120 tablet 0   • traMADol (ULTRAM) 50 MG tablet Take 1  tablet by mouth Every 6 (Six) Hours As Needed for Moderate Pain . 120 tablet 2   • traZODone (DESYREL) 50 MG tablet Take 1-3 tablets by mouth Every Night. 90 tablet 11   • umeclidinium-vilanterol (ANORO ELLIPTA) 62.5-25 MCG/INH aerosol powder  inhaler Inhale 1 puff Daily. 1 each 11   • umeclidinium-vilanterol (ANORO ELLIPTA) 62.5-25 MCG/INH aerosol powder  inhaler 1 puff daily 1 each 5   • sildenafil (VIAGRA) 100 MG tablet 1/2 to 1 tablet 30 min up to 4 hours before sex. 5 tablet 11     No current facility-administered medications for this visit.         The following portions of the patient's history were reviewed and updated as appropriate: allergies, current medications, past family history, past medical history, past social history, past surgical history and problem list.        Assessment/Plan   Fuad was seen today for itching.    Diagnoses and all orders for this visit:    Itching  -     triamcinolone acetonide (KENALOG-40) injection 80 mg; Inject 2 mL into the appropriate muscle as directed by prescriber 1 (One) Time.                       No Follow-up on file.                  This document has been electronically signed by Thomas Pierson MD on January 29, 2019 6:10 PM

## 2019-03-04 NOTE — PROGRESS NOTES
"This gentleman has COPD and hypoxemia with a recent pneumonia.  His breathing has improved and his edema is improved.  He continues to produce discolored sputum.  He denies chest pain or hemoptysis    ROS    Constitutional-no night sweats weight loss headaches  GI no abdominal pain nausea or diarrhea  Neuro no seizure or neurologic deficits  Musculoskeletal no deformity or joint pain   no dysuria or hematuria  Skin no rash or other lesions  All other systems reviewed and were negative except for the above.      Physical Exam  BP (!) 172/103   Pulse 101   Ht 167.6 cm (65.98\")   Wt 61.2 kg (135 lb)   SpO2 92%   BMI 21.80 kg/m²   Vital signs as above  Pupils equally round and reactive to light and accommodation, neck no JVD or adenopathy.  Cardiovascular regular rhythm and rate no murmur or gallop.  Abdomen soft no organomegaly tenderness.  Extremities no clubbing cyanosis or edema.  No cervical adenopathy.  No skin rash.  Neurologic good strength bilaterally without deficits  Elderly white male oxygen appears quite frail but in no respiratory distress at rest, lungs reveal diminished breath sounds without wheeze    Impression COPD with persistent bronchitis    Plan doxycycline, continue present medications, return in 3 months        This document has been produced with the assistance of Dragon dictation  This document has been electronically signed by Indio Larsen MD on March 4, 2019 3:11 PM      "

## 2019-03-19 NOTE — PROGRESS NOTES
" Subjective   Fuad Brumfield is a 78 y.o. male.     History of Present Illness     Right foot pain 1 week.  Hurts when walking and at night.   No trauma    Review of Systems   Constitutional: Negative for chills, fatigue and fever.   HENT: Negative for congestion, ear discharge, ear pain, facial swelling, hearing loss, postnasal drip, rhinorrhea, sinus pressure, sore throat, trouble swallowing and voice change.    Eyes: Negative for discharge, redness and visual disturbance.   Respiratory: Negative for cough, chest tightness, shortness of breath and wheezing.    Cardiovascular: Negative for chest pain and palpitations.   Gastrointestinal: Negative for abdominal pain, blood in stool, constipation, diarrhea, nausea and vomiting.   Endocrine: Negative for polydipsia and polyuria.   Genitourinary: Negative for dysuria, flank pain, hematuria and urgency.   Musculoskeletal: Positive for back pain. Negative for arthralgias, joint swelling and myalgias.   Skin: Negative for rash.   Neurological: Negative for dizziness, weakness, numbness and headaches.   Hematological: Negative for adenopathy.   Psychiatric/Behavioral: Negative for confusion and sleep disturbance. The patient is not nervous/anxious.            /72 (BP Location: Left arm, Patient Position: Sitting, Cuff Size: Adult)   Pulse 112   Temp 96.8 °F (36 °C) (Tympanic)   Ht 167.6 cm (65.98\")   Wt 58.7 kg (129 lb 6.4 oz)   SpO2 93%   BMI 20.90 kg/m²       Objective     Physical Exam   Constitutional: He is oriented to person, place, and time. He appears well-developed and well-nourished.   HENT:   Head: Normocephalic and atraumatic.   Right Ear: External ear normal.   Left Ear: External ear normal.   Nose: Nose normal.   Mouth/Throat: Oropharynx is clear and moist.   Eyes: Conjunctivae and EOM are normal. Pupils are equal, round, and reactive to light.   Neck: Normal range of motion. Neck supple.   Cardiovascular: Normal rate, regular rhythm and normal " heart sounds. Exam reveals no gallop and no friction rub.   No murmur heard.  Pulmonary/Chest: Effort normal and breath sounds normal.   Abdominal: Soft. Bowel sounds are normal. He exhibits no distension. There is no tenderness. There is no rebound and no guarding.   Musculoskeletal: Normal range of motion. He exhibits no edema or deformity.   Slight callous over 5th tarsal metatarsal joint. Tender to lightest touch.  Mild redness, very mild. No swelling.   Neurological: He is alert and oriented to person, place, and time. No cranial nerve deficit.   Skin: Skin is warm and dry. No rash noted. No erythema.   Psychiatric: He has a normal mood and affect. His behavior is normal. Judgment and thought content normal.   Nursing note and vitals reviewed.          PAST MEDICAL HISTORY     Past Medical History:   Diagnosis Date   • Allergic rhinitis    • Allergic rhinitis due to pollen    • Anxiety state    • Atopic dermatitis    • Backache    • Chronic obstructive lung disease (CMS/HCC)    • Common cold    • Constipation    • Contact dermatitis    • Cough    • Diarrhea    • Drug therapy     Long-term drug therapy     • Dyspnea    • Exanthematous disorder    • Hypoxia    • Itch of skin    • Malaise and fatigue     Other malaise and fatigue     • Male erectile disorder    • Nocturia     finding     • Osteoarthritis    • Pain in joint, ankle and foot    • Rash     O/E - a rash    • Screening for malignant neoplasm of prostate    • Upper respiratory infection       PAST SURGICAL HISTORY     Past Surgical History:   Procedure Laterality Date   • CATARACT EXTRACTION Left 12/09/2003    Cataract removal (1)   - Left eye   • INJECTION OF MEDICATION  09/09/2015    Kenalog (11) - EDA Pierson      SOCIAL HISTORY     Social History     Socioeconomic History   • Marital status:      Spouse name: Not on file   • Number of children: Not on file   • Years of education: Not on file   • Highest education level: Not on file   Tobacco Use    • Smoking status: Former Smoker   • Smokeless tobacco: Former User   Substance and Sexual Activity   • Alcohol use: No   • Drug use: No   • Sexual activity: Defer      ALLERGIES   Patient has no known allergies.   MEDICATIONS     Current Outpatient Medications   Medication Sig Dispense Refill   • acetaminophen (TYLENOL) 325 MG tablet Take 2 tablets by mouth Every 4 (Four) Hours As Needed for Mild Pain . 30 tablet 0   • albuterol (PROVENTIL) (2.5 MG/3ML) 0.083% nebulizer solution Take 2.5 mg by nebulization 4 (Four) Times a Day. Inhale via nebulizer four times daily as needed. 300 mL 12   • ammonium lactate (LAC-HYDRIN) 12 % lotion Apply  topically. ammonium lactate 12 % lotion  -  Apply as directed 2 times per day as needed.      • busPIRone (BUSPAR) 5 MG tablet Take 1 tablet by mouth 3 (Three) Times a Day. 90 tablet 5   • cyproheptadine (PERIACTIN) 4 MG tablet Take 1 tablet by mouth 3 (Three) Times a Day As Needed for Allergies. 40 tablet 1   • famotidine (PEPCID) 40 MG tablet Take 1 tablet by mouth Daily. 30 tablet 11   • furosemide (LASIX) 40 MG tablet Take 1 tablet by mouth Daily. 1 tablet daily as needed for swelling 30 tablet 2   • hydrOXYzine (ATARAX) 25 MG tablet Take 1 tablet by mouth Every 6 (Six) Hours As Needed for Itching. 120 tablet 11   • ipratropium (ATROVENT) 0.02 % nebulizer solution Take 2.5 mL by nebulization 4 (Four) Times a Day. 300 mL 12   • ipratropium-albuterol (DUO-NEB) 0.5-2.5 mg/3 ml nebulizer Take 3 mL by nebulization Every 4 (Four) Hours As Needed for Wheezing. 360 mL 0   • loratadine (CLARITIN) 10 MG tablet Take 1 tablet by mouth Daily. 30 tablet 11   • megestrol (MEGACE ORAL) 40 MG/ML suspension Take 10 mL by mouth Daily. 300 mL 2   • methylphenidate (RITALIN) 10 MG tablet Take  by mouth. Take 1 or 2 tablets daily.     • modafinil (PROVIGIL) 200 MG tablet TAKE ONE TABLET BY MOUTH ONCE DAILY 30 tablet 5   • nystatin (MYCOSTATIN) 958629 UNIT/ML suspension Swish and swallow 5 mL 4  (Four) Times a Day. Swish for 30 seconds then swallow 240 mL 5   • oxyCODONE-acetaminophen (PERCOCET) 5-325 MG per tablet Take 1 tablet by mouth Every 4 (Four) Hours As Needed for Moderate Pain . 120 tablet 0   • sildenafil (VIAGRA) 100 MG tablet 1/2 to 1 tablet 30 min up to 4 hours before sex. 5 tablet 11   • traMADol (ULTRAM) 50 MG tablet Take 1 tablet by mouth Every 6 (Six) Hours As Needed for Moderate Pain . 120 tablet 2   • traZODone (DESYREL) 50 MG tablet Take 1-3 tablets by mouth Every Night. 90 tablet 11   • umeclidinium-vilanterol (ANORO ELLIPTA) 62.5-25 MCG/INH aerosol powder  inhaler Inhale 1 puff Daily. 1 each 11   • naproxen (NAPROSYN) 375 MG tablet Take 1 tablet by mouth 2 (Two) Times a Day As Needed for Mild Pain . 60 tablet 1     No current facility-administered medications for this visit.         The following portions of the patient's history were reviewed and updated as appropriate: allergies, current medications, past family history, past medical history, past social history, past surgical history and problem list.        Assessment/Plan   Fuad was seen today for foot pain.    Diagnoses and all orders for this visit:    Metatarsalgia of right foot    Right foot pain  -     XR Foot 3+ View Right    Other orders  -     Discontinue: naproxen (NAPROSYN) 375 MG tablet; Take 1 tablet by mouth 2 (Two) Times a Day As Needed for Mild Pain .  -     naproxen (NAPROSYN) 375 MG tablet; Take 1 tablet by mouth 2 (Two) Times a Day As Needed for Mild Pain .      Get lidocaine patches otc and cut to size and apply to this part of foot.    If not better in a week or so, will send to podiatrist.     Xray:  I didn't see a fracture.                  No Follow-up on file.                  This document has been electronically signed by Thomas Pierson MD on March 19, 2019 3:02 PM

## 2019-04-15 NOTE — TELEPHONE ENCOUNTER
Dr. Larsen said we could not give him a prescription- for Prednisone without first seeing him in the office.  He will need to schedule an appointment Per Dr. Larsen               ---- Message from Frances Esquivel sent at 4/15/2019 10:57 AM CDT -----  Pt called requesting a refill of prednisone to [facility]. He says he has no appetite, has not been eating well, and is losing weight.

## 2019-04-16 NOTE — PROGRESS NOTES
"This elderly gentleman has COPD hypoxemia and general debility.  His main problem has been poor appetite.  He is not been eating very much recently.  He denies nausea and vomiting    ROS    Constitutional-no night sweats weight loss headaches  GI no abdominal pain nausea or diarrhea  Neuro no seizure or neurologic deficits  Musculoskeletal no deformity or joint pain   no dysuria or hematuria  Skin no rash or other lesions  All other systems reviewed and were negative except for the above.      Physical Exam  /70   Pulse 115   Ht 167.6 cm (65.98\")   Wt 58.8 kg (129 lb 9.6 oz)   SpO2 94%   BMI 20.93 kg/m²   Vital signs as above  Pupils equally round and reactive to light and accommodation, neck no JVD or adenopathy.  Cardiovascular regular rhythm and rate no murmur or gallop.  Abdomen soft no organomegaly tenderness.  Extremities no clubbing cyanosis or edema.  No cervical adenopathy.  No skin rash.  Neurologic good strength bilaterally without deficits  Lungs diminished breath sounds without wheeze, tongue is dry    Impression anorexia with mild dehydration, COPD and hypoxemia    Plan Periactin 4 mg 3 times daily as needed and take lots of liquids, prednisone taper over 2 weeks, return if not improved within 2 weeks        This document has been produced with the assistance of Dragon dictation  This document has been electronically signed by Indio Larsen MD on April 16, 2019 2:10 PM      "

## 2019-04-22 NOTE — PROGRESS NOTES
Fuad Brumfield  1940  78 y.o. male   Not diabetic     Patient presents today with a blister on the side of his right foot.    04/22/2019  Chief Complaint   Patient presents with   • Right Foot - Pain     Blister on side of foot           History of Present Illness    Fuad Brumfield is a 78 y.o. male who presents for evaluation of right foot pain.  He states the pain is along the outside of his right foot near his midfoot.  He describes his pain as sharp and severe, although it has been improving over the past couple of days.  He states there is a red spot in this area.  He was previously evaluated by his primary care who gave him antibiotics have him start bandaging the area.  He states he is also been placing lidocaine patches in the area.  He denies any known trauma or injuries.  He denies any drainage from the site.  He reports no other previous tried treatments.      Past Medical History:   Diagnosis Date   • Allergic rhinitis    • Allergic rhinitis due to pollen    • Anxiety state    • Atopic dermatitis    • Backache    • Chronic obstructive lung disease (CMS/HCC)    • Common cold    • Constipation    • Contact dermatitis    • Cough    • Diarrhea    • Drug therapy     Long-term drug therapy     • Dyspnea    • Exanthematous disorder    • Hypoxia    • Itch of skin    • Malaise and fatigue     Other malaise and fatigue     • Male erectile disorder    • Nocturia     finding     • Osteoarthritis    • Pain in joint, ankle and foot    • Rash     O/E - a rash    • Screening for malignant neoplasm of prostate    • Upper respiratory infection          Past Surgical History:   Procedure Laterality Date   • CATARACT EXTRACTION Left 12/09/2003    Cataract removal (1)   - Left eye   • INJECTION OF MEDICATION  09/09/2015    Kenalog (11) - EDA Pierson         History reviewed. No pertinent family history.      Social History     Socioeconomic History   • Marital status:      Spouse name: Not on file   • Number of  children: Not on file   • Years of education: Not on file   • Highest education level: Not on file   Tobacco Use   • Smoking status: Former Smoker   • Smokeless tobacco: Former User   Substance and Sexual Activity   • Alcohol use: No   • Drug use: No   • Sexual activity: Defer         Current Outpatient Medications   Medication Sig Dispense Refill   • acetaminophen (TYLENOL) 325 MG tablet Take 2 tablets by mouth Every 4 (Four) Hours As Needed for Mild Pain . 30 tablet 0   • albuterol (PROVENTIL) (2.5 MG/3ML) 0.083% nebulizer solution Take 2.5 mg by nebulization 4 (Four) Times a Day. Inhale via nebulizer four times daily as needed. 300 mL 12   • ammonium lactate (LAC-HYDRIN) 12 % lotion Apply  topically. ammonium lactate 12 % lotion  -  Apply as directed 2 times per day as needed.      • busPIRone (BUSPAR) 5 MG tablet Take 1 tablet by mouth 3 (Three) Times a Day. 90 tablet 5   • cyproheptadine (PERIACTIN) 4 MG tablet Take 1 tablet by mouth 3 (Three) Times a Day As Needed for Allergies. 40 tablet 1   • cyproheptadine (PERIACTIN) 4 MG tablet Take 1 tablet by mouth 3 (Three) Times a Day As Needed for Allergies. 40 tablet 1   • famotidine (PEPCID) 40 MG tablet Take 1 tablet by mouth Daily. 30 tablet 11   • furosemide (LASIX) 40 MG tablet Take 1 tablet by mouth Daily. 1 tablet daily as needed for swelling 30 tablet 2   • hydrOXYzine (ATARAX) 25 MG tablet Take 1 tablet by mouth Every 6 (Six) Hours As Needed for Itching. 120 tablet 11   • ipratropium (ATROVENT) 0.02 % nebulizer solution Take 2.5 mL by nebulization 4 (Four) Times a Day. 300 mL 12   • ipratropium-albuterol (DUO-NEB) 0.5-2.5 mg/3 ml nebulizer Take 3 mL by nebulization Every 4 (Four) Hours As Needed for Wheezing. 360 mL 0   • loratadine (CLARITIN) 10 MG tablet Take 1 tablet by mouth Daily. 30 tablet 11   • megestrol (MEGACE ORAL) 40 MG/ML suspension Take 10 mL by mouth Daily. 300 mL 2   • methylphenidate (RITALIN) 10 MG tablet Take  by mouth. Take 1 or 2  "tablets daily.     • modafinil (PROVIGIL) 200 MG tablet TAKE ONE TABLET BY MOUTH ONCE DAILY 30 tablet 5   • naproxen (NAPROSYN) 375 MG tablet Take 1 tablet by mouth 2 (Two) Times a Day As Needed for Mild Pain . 60 tablet 1   • nystatin (MYCOSTATIN) 457054 UNIT/ML suspension Swish and swallow 5 mL 4 (Four) Times a Day. Swish for 30 seconds then swallow 240 mL 5   • oxyCODONE-acetaminophen (PERCOCET) 5-325 MG per tablet Take 1 tablet by mouth Every 4 (Four) Hours As Needed for Moderate Pain . 120 tablet 0   • predniSONE (DELTASONE) 10 MG (21) tablet pack Use as directed on package 21 tablet 0   • sildenafil (VIAGRA) 100 MG tablet 1/2 to 1 tablet 30 min up to 4 hours before sex. 5 tablet 11   • traMADol (ULTRAM) 50 MG tablet Take 1 tablet by mouth Every 6 (Six) Hours As Needed for Moderate Pain . 120 tablet 2   • traZODone (DESYREL) 50 MG tablet Take 1-3 tablets by mouth Every Night. 90 tablet 11   • umeclidinium-vilanterol (ANORO ELLIPTA) 62.5-25 MCG/INH aerosol powder  inhaler Inhale 1 puff Daily. 1 each 11     No current facility-administered medications for this visit.          OBJECTIVE    Pulse (!) 134   Ht 167.6 cm (65.99\")   Wt 58.5 kg (129 lb)   SpO2 99%   BMI 20.83 kg/m²       Review of Systems   Constitutional: Negative.    HENT: Positive for hearing loss.    Eyes: Negative.    Respiratory: Positive for shortness of breath and wheezing.    Endocrine: Negative.    Genitourinary: Negative.    Musculoskeletal: Positive for back pain.   Allergic/Immunologic: Negative.    Neurological: Negative.          Physical Exam     Constitutional: he appears well-developed and well-nourished.   HEENT: Normocephalic. Atraumatic  CV: No tenderness. RRR  Resp: Non-labored respiration. No wheezes.   Psychiatric: he has a normal mood and affect. his   behavior is normal.      Lower Extremity Exam:  Vascular: DP/PT pulses palpable 1+.   Negative hair growth.   No perimalleolar edema  Neuro: Protective sensation diminished to " lesser toes, b/l.  DTRs intact  Integument: 0.5 cm stage II pressure ulceration to lateral fifth metatarsal base on the right.  Minimal surrounding erythema.  No drainage no probe to bone.  Atrophic skin noted b/l   No masses  Webspaces c/d/i  Musculoskeletal: LE muscle strength 5/5.   Gait Normal  Semirigid hammertoe deformity toes 2 through 5 bilateral.  Nails 1-5 b/l thickened  Ankle ROM within normal limits, b/l              ASSESSMENT AND PLAN    Fuad was seen today for pain.    Diagnoses and all orders for this visit:    Pressure injury of right foot, stage 2    Right foot pain      -Comprehensive foot and ankle exam.  -Discussed findings of suspected pressure ulceration to fifth metatarsal styloid process.  Discussed appropriate offloading while at rest.  -Begin daily wound care with medihoney.  To both medi honey dispensed today.  -Recheck 2 weeks          This document has been electronically signed by Thomas Boateng DPM on April 23, 2019 8:07 AM     EMR Dragon/Transcription disclaimer:   Much of this encounter note is an electronic transcription/translation of spoken language to printed text. The electronic translation of spoken language may permit erroneous, or at times, nonsensical words or phrases to be inadvertently transcribed; Although I have reviewed the note for such errors, some may still exist.    Thomas Boateng DPM  4/23/2019  8:07 AM

## 2019-05-14 PROBLEM — R77.8 ELEVATED TROPONIN: Status: ACTIVE | Noted: 2019-01-01

## 2019-05-15 PROBLEM — R53.83 FATIGUE: Status: ACTIVE | Noted: 2019-01-01

## 2019-05-15 NOTE — PROGRESS NOTES
Discharge Planning Assessment  [facility]     Patient Name: Fuad Brumfield  MRN: [id number]  Today's Date: 5/15/2019    Admit Date: 5/14/2019    Discharge Needs Assessment     Row Name 05/15/19 1500       Living Environment    Lives With  spouse    Current Living Arrangements  home/apartment/condo Information on face sheet confirmed with patient .     Primary Care Provided by  self    Provides Primary Care For  no one    Family Caregiver if Needed  spouse;other (see comments) Patient voiced that his next door neighbor, Jose, assists as needed.     Quality of Family Relationships  involved;helpful    Able to Return to Prior Arrangements  yes       Resource/Environmental Concerns    Transportation Concerns  car, none       Transition Planning    Patient/Family Anticipates Transition to  home with family    Patient/Family Anticipated Services at Transition  home health care    Transportation Anticipated  family or friend will provide If patient is unable to drive, patient voiced that his spouse and/or neighbor, Jose, will be available to assist.        Discharge Needs Assessment    Readmission Within the Last 30 Days  no previous admission in last 30 days    Concerns to be Addressed  adjustment to diagnosis/illness    Concerns Comments  MARIANARK discussed with patient the benefits of home health services at d/ for extended therapy. Patient was resistant initially; however, after explaining the benefits he has agreed to services.     Equipment Currently Used at Home  oxygen;shower chair;walker, rolling Patient voiced that he has home/portable oxygen thru [facility].     Anticipated Changes Related to Illness  none    Equipment Needed After Discharge  none    Discharge Facility/Level of Care Needs  home with home health    Offered/Gave Vendor List  yes    Patient's Choice of Community Agency(s)  [facility]     Current Discharge Risk  chronically ill        Discharge Plan     Row Name 05/15/19 150        Plan    Plan  home with home health    Plan Comments  Assessment complete. Patient is agreeable to home health services for PT/OT and skilled service: respiratory assessment. Patient agreeable to [facility] health. JOSE confirmed with patient that he has a walker available for home use and portable oxygen for transport at d/c. No additional needs presented and/or voiced.....Malu Floyd LSW    Row Name 05/15/19 1453       Plan    Plan Comments  observation f/u- CT chest pending....Ria Goldberg RN    Row Name 05/15/19 1321       Plan    Plan Comments  attemtped consult- pt off floor at this time for CT scan....Ria Goldberg RN        Destination      No service coordination in this encounter.      Durable Medical Equipment      No service coordination in this encounter.      Dialysis/Infusion      No service coordination in this encounter.      Home Medical Care      No service coordination in this encounter.      Therapy      No service coordination in this encounter.      Community Resources      No service coordination in this encounter.        Expected Discharge Date and Time     Expected Discharge Date Expected Discharge Time    May 16, 2019         Demographic Summary     Row Name 05/15/19 1459       General Information    Admission Type  observation    Arrived From  emergency department    Reason for Consult  other (see comments) Epic prompt    Preferred Language  English     Used During This Interaction  no       Contact Information    Contact Information Obtained for          Functional Status     Row Name 05/15/19 1621       Functional Status    Functional Status Comments  Patient voiced independence with ADL's prior to admission.        Functional Status, IADL    Medications  assistive person Pharmacy, [facility], and coverage confirmed with patient. Patient voiced that his spouse assists with medications.     Meal Preparation  completely dependent    Housekeeping  completely dependent     Laundry  completely dependent    Shopping  assistive person    IADL Comments  Spouse performs most of the IADL's.        Mental Status Summary    Recent Changes in Mental Status/Cognitive Functioning  no changes       Employment/    Employment Status  retired        Psychosocial    No documentation.       Abuse/Neglect    No documentation.       Legal    No documentation.       Substance Abuse    No documentation.       Patient Forms    No documentation.           ZEE Padilla

## 2019-05-15 NOTE — PROGRESS NOTES
[facility] Medicine Services  INPATIENT PROGRESS NOTE    Length of Stay: 0  Date of Admission: 5/14/2019  Primary Care Physician: Thomas Pierson MD    Subjective   Chief Complaint: No complaints    HPI: This is a 78-year-old  male with past medical history of anxiety and COPD that presented to [facility] on 5/14/2019 with complaints of 3 days of generalized weakness and several weeks of decreased appetite.  Patient also reports nausea and vomiting over the last several days.  Spoke with the patient and his wife about his past medical history and they state that he has a lung mass found last year and is being followed by Dr. Larsen and pulmonology.  CT of the abdomen and pelvis showed emphysematous changes with mild atelectasis in the lower lungs.  Notation of a mild dilation of infrarenal abdominal aorta measuring 2.8 cm. Wife states he was hurt a few days ago on his tractor and took a hard hit to his chest.     Review of Systems   Constitutional: Positive for fatigue. Negative for activity change.   HENT: Negative for ear pain and sore throat.    Eyes: Negative for pain and discharge.   Respiratory: Negative for cough and shortness of breath.    Cardiovascular: Negative for chest pain and palpitations.   Gastrointestinal: Positive for nausea. Negative for abdominal pain.   Endocrine: Negative for cold intolerance and heat intolerance.   Genitourinary: Negative for difficulty urinating and dysuria.   Musculoskeletal: Negative for arthralgias and gait problem.   Skin: Negative for color change and rash.   Neurological: Positive for weakness. Negative for dizziness.   Psychiatric/Behavioral: Negative for agitation and confusion.        Objective    Temp:  [97.8 °F (36.6 °C)-98.8 °F (37.1 °C)] 97.8 °F (36.6 °C)  Heart Rate:  [68-96] 96  Resp:  [16-18] 16  BP: (113-183)/(57-85) 127/60    Physical Exam   Constitutional: He is oriented to person, place, and time. He  appears well-developed and well-nourished.   HENT:   Head: Normocephalic and atraumatic.   Eyes: EOM are normal. Pupils are equal, round, and reactive to light.   Neck: Normal range of motion. Neck supple.   Cardiovascular: Normal rate and regular rhythm.   Pulmonary/Chest: Effort normal and breath sounds normal.   Abdominal: Soft. Bowel sounds are normal.   Musculoskeletal: Normal range of motion.   Neurological: He is alert and oriented to person, place, and time.   Skin: Skin is warm and dry.   Psychiatric: He has a normal mood and affect. His behavior is normal.     Results Review:  I have reviewed the labs, radiology results, and diagnostic studies.    Laboratory Data:   Results from last 7 days   Lab Units 05/15/19  0232 05/14/19  2024   SODIUM mmol/L 137 135*   POTASSIUM mmol/L 4.0 4.1   CHLORIDE mmol/L 103 99   CO2 mmol/L 19.0* 20.0*   BUN mg/dL 15 18   CREATININE mg/dL 1.15 1.26   GLUCOSE mg/dL 112* 79   CALCIUM mg/dL 8.1* 8.3*   BILIRUBIN mg/dL  --  0.6   ALK PHOS U/L  --  83   ALT (SGPT) U/L  --  5   AST (SGOT) U/L  --  16   ANION GAP mmol/L 15.0 16.0     Estimated Creatinine Clearance: 43.8 mL/min (by C-G formula based on SCr of 1.15 mg/dL).  Results from last 7 days   Lab Units 05/15/19  0232   MAGNESIUM mg/dL 1.6         Results from last 7 days   Lab Units 05/15/19  0232 05/14/19  2024   WBC 10*3/mm3 7.92 10.16   HEMOGLOBIN g/dL 12.2* 12.8*   HEMATOCRIT % 36.7* 38.2   PLATELETS 10*3/mm3 276 300           Culture Data:   No results found for: BLOODCX  No results found for: URINECX  No results found for: RESPCX  No results found for: WOUNDCX  No results found for: STOOLCX  No components found for: BODYFLD    Radiology Data:   Imaging Results (last 24 hours)     Procedure Component Value Units Date/Time    CT Chest With Contrast [[id number]] Updated:  05/15/19 1342    CT Abdomen Pelvis With Contrast [[id number]] Collected:  05/14/19 2211     Updated:  05/14/19 2251    Narrative:       Exam: CT abdomen   pelvis with contrast.    INDICATION: Weakness, history of lung carcinoma    TECHNIQUE: Routine CT abdomen pelvis with contrast. Sagittal  coronal reconstructions were obtained. This exam was performed  according to our departmental dose-optimization program, which  includes automated exposure control, adjustment of the mA and/or  kV according to patient size and/or use of iterative  reconstruction technique.    FINDINGS: Pulmonary emphysematous changes are present. Mild  atelectasis is present in the posterior aspects of the lower  lungs.    The liver, spleen, pancreas and adrenal glands gallbladder and  kidneys unremarkable. No urinary tract calculus or  hydronephrosis.    There is mild dilatation of infrarenal abdominal aorta measuring  up to 2.8 cm. No significant adenopathy. No bowel obstruction or  abnormal bowel wall thickening. Diverticula disease is present.  No evidence of diverticulitis. Normal appendix. No abnormal  stranding in the mesentery or ascites.    Bladder is unremarkable.    The degenerative changes present in the spine.      Impression:       1. No obvious acute abnormality   2. Diverticulosis  3. Dilatation of the infrarenal abdominal aorta measuring up to  2.8 cm. AAA Size: Follow-up Recommendation (1):  2.6 - 2.9 cm   Every 5 years (2)  3.0 - 3.4 cm   Every 3 years  3.5 - 3.9 cm   Every 12 months  4.0 - 4.4 cm   Every 12 months, vasc consult rec  4.5 - 5.4 cm   Every 6 months, vasc consult rec  >=5.5 cm   Referral to vascular surgeon recommended  ______________________________________________________  (1)Based upon the Society for Vascular Surgery Guidelines:   J Vasc Surg. 2009 Oct;50(4 Suppl):S2-49   (2)For aortas of max dung of 2.6-2.9 cm that meet criteria for AAA    (>= 1.5 x proximal normal segment)  4. Pulmonary emphysema    Electronically signed by:  Duke Boateng MD  5/14/2019 10:50 PM  CDT Workstation: [id number]    [facility] Chest 1 View [[id number]] Collected:  05/14/19 2004      Updated:  05/14/19 2039    Narrative:         EXAM:         Radiograph(s), Chest   VIEWS:   Frontal  ; 1       DATE/TIME:  5/14/2019 8:37 PM CDT                INDICATION:   weakness, history of lung cancer    COMPARISON:  CXR: 10/19/18             FINDINGS:             - lines/tubes:    none     - cardiac:         size within normal limits         - mediastinum: contour within normal limits         - lungs:         no focal air space process, pulmonary  interstitial edema, nodule(s)/mass             - pleura:         no evidence of  fluid                  - osseous:         unremarkable for age                  - misc.:         Impression:       CONCLUSION:        1. No evidence of an active cardiopulmonary process.                                                              Electronically signed by:  MAXINE Gallegos MD  5/14/2019 8:38  PM CDT Workstation: [id number]          I have reviewed the patient's current medications.     Assessment/Plan     Active Hospital Problems    Diagnosis POA   • Fatigue [R53.83] Unknown   • Elevated troponin [R74.8] Yes   • Chronic obstructive pulmonary disease (CMS/HCC) [J44.9] Yes       Plan:    1.  Fatigue/ weakness:  Continue PT/OT.  CT chest pending.  Continue PT/OT.  2.  COPD:  Continue duonebs and steroids.       Discharge Planning: I expect patient to be discharged to home in 1-2 days.      This document has been electronically signed by LUIS ALBERTO Cedillo on May 15, 2019 2:45 PM

## 2019-05-15 NOTE — H&P
[facility] Medicine Admission      Date of Admission: 5/14/2019      Primary Care Physician: Thomas Pierson MD      Chief Complaint: Weakness    HPI: The patient is a 78-year-old male with history of lung cancer that presented to the emergency room complaining of about 3 days of generalized weakness and several weeks of decreased appetite.  There has been about 2 episodes of nausea and vomiting in the last several days.  The patient underwent CAT scan of the abdomen and pelvis in the ER and showed 2 cm AAA.  Troponin is 0.072 but denies chest pain or shortness of breath.  The patient feels better after getting IV fluids and antiemetics in the ED and would like to eat food.    Denies fevers, chills, sweats, chest pain, back pain, shortness of air, constipation or diarrhea.    Concurrent Medical History:  has a past medical history of Allergic rhinitis, Allergic rhinitis due to pollen, Anxiety state, Atopic dermatitis, Backache, Chronic obstructive lung disease (CMS/HCC), Common cold, Constipation, Contact dermatitis, Cough, Diarrhea, Drug therapy, Dyspnea, Exanthematous disorder, Hypoxia, Itch of skin, Malaise and fatigue, Male erectile disorder, Nocturia, Osteoarthritis, Pain in joint, ankle and foot, Rash, Screening for malignant neoplasm of prostate, and Upper respiratory infection.    Past Surgical History:  has a past surgical history that includes Cataract Extraction (Left, 12/09/2003) and Injection of Medication (09/09/2015).    Family History: family history is not on file.     Social History:  reports that he has quit smoking. He has quit using smokeless tobacco. He reports that he does not drink alcohol or use drugs.    Allergies: No Known Allergies    Medications:   Prior to Admission medications    Medication Sig Start Date End Date Taking? Authorizing Provider   predniSONE (DELTASONE) 10 MG (21) tablet pack Use as directed on package 4/16/19  Yes Suzie  Indio MCKNIGHT MD   acetaminophen (TYLENOL) 325 MG tablet Take 2 tablets by mouth Every 4 (Four) Hours As Needed for Mild Pain . 6/27/18   Wolfgang Rabago MD   albuterol (PROVENTIL) (2.5 MG/3ML) 0.083% nebulizer solution Take 2.5 mg by nebulization 4 (Four) Times a Day. Inhale via nebulizer four times daily as needed. 6/4/18   Thomas Pierson MD   ammonium lactate (LAC-HYDRIN) 12 % lotion Apply  topically. ammonium lactate 12 % lotion  -  Apply as directed 2 times per day as needed.     Provider, MD Rose   busPIRone (BUSPAR) 5 MG tablet Take 1 tablet by mouth 3 (Three) Times a Day. 2/4/19   Thomas Pierson MD   cyproheptadine (PERIACTIN) 4 MG tablet Take 1 tablet by mouth 3 (Three) Times a Day As Needed for Allergies. 8/3/18   Indio Larsen MD   cyproheptadine (PERIACTIN) 4 MG tablet Take 1 tablet by mouth 3 (Three) Times a Day As Needed for Allergies. 4/16/19   Indio Larsen MD   famotidine (PEPCID) 40 MG tablet Take 1 tablet by mouth Daily. 8/2/18   Thomas Pierson MD   furosemide (LASIX) 40 MG tablet Take 1 tablet by mouth Daily. 1 tablet daily as needed for swelling 12/4/18   Indio Larsen MD   hydrOXYzine (ATARAX) 25 MG tablet Take 1 tablet by mouth Every 6 (Six) Hours As Needed for Itching. 12/19/17   Thomas Pierson MD   ipratropium (ATROVENT) 0.02 % nebulizer solution Take 2.5 mL by nebulization 4 (Four) Times a Day. 6/4/18   Thomas Pierson MD   ipratropium-albuterol (DUO-NEB) 0.5-2.5 mg/3 ml nebulizer Take 3 mL by nebulization Every 4 (Four) Hours As Needed for Wheezing. 6/27/18   Wolfgang Rabago MD   loratadine (CLARITIN) 10 MG tablet Take 1 tablet by mouth Daily. 6/5/18   Thomas Pierson MD   megestrol (MEGACE ORAL) 40 MG/ML suspension Take 10 mL by mouth Daily. 7/9/18   Thomas Pierson MD   methylphenidate (RITALIN) 10 MG tablet Take  by mouth. Take 1 or 2 tablets daily.    ProviderRose MD   modafinil (PROVIGIL) 200 MG tablet TAKE ONE TABLET BY MOUTH ONCE DAILY 7/25/18   Hack,  Thomas MENJIVAR MD   nystatin (MYCOSTATIN) 099392 UNIT/ML suspension Swish and swallow 5 mL 4 (Four) Times a Day. Swish for 30 seconds then swallow 7/2/18   Thomas Pierson MD   oxyCODONE-acetaminophen (PERCOCET) 5-325 MG per tablet Take 1 tablet by mouth Every 4 (Four) Hours As Needed for Moderate Pain . 8/23/18   Thomas Pierson MD   sildenafil (VIAGRA) 100 MG tablet 1/2 to 1 tablet 30 min up to 4 hours before sex. 1/22/18   Thomas Pierson MD   traMADol (ULTRAM) 50 MG tablet Take 1 tablet by mouth Every 6 (Six) Hours As Needed for Moderate Pain . 10/17/18   Thomas Pierson MD   traZODone (DESYREL) 50 MG tablet Take 1-3 tablets by mouth Every Night. 12/3/18   Thomas Pierson MD   umeclidinium-vilanterol (ANORO ELLIPTA) 62.5-25 MCG/INH aerosol powder  inhaler Inhale 1 puff Daily. 5/29/18   Thomas Pierson MD   naproxen (NAPROSYN) 375 MG tablet Take 1 tablet by mouth 2 (Two) Times a Day As Needed for Mild Pain . 3/19/19 5/15/19  Thomas Pierson MD       Review of Systems:  As per history of present illness and a complete 12 point review of systems is otherwise negative.     Physical Exam:   Temp:  [97.9 °F (36.6 °C)-98.8 °F (37.1 °C)] 97.9 °F (36.6 °C)  Heart Rate:  [79-94] 79  Resp:  [16] 16  BP: (113-183)/(57-85) 115/58    Gen.: Appears as stated age.  No acute distress.  HEENT: EOMI.  PERRLA.  Sclera anicteric.  Moist mucous membranes.  Neck: Supple.  No JVD.  No thyromegaly.  Chest: Decreased breath sounds lower lobes.  Heart: Regular rhythm and rate.  No murmurs, rubs or gallops.  Abdomen: Normoactive bowel sounds.  Nontender.  Nondistended.  No guarding. Neurological: Cranial nerves II through XII are grossly intact.  No cerebellar signs.   Extremities: Good range of motion throughout.  No cyanosis, clubbing or edema.  Skin: Warm.  No rashes.  No ulcers.  Psychiatry: Cooperative.        Results Reviewed:  I have personally reviewed current lab, radiology, and data and agree with results.  Lab Results (last  24 hours)     Procedure Component Value Units Date/Time    Ferritin [[id number]]  (Normal) Collected:  05/15/19 0232    Specimen:  Blood Updated:  05/15/19 0316     Ferritin 399.20 ng/mL     T4, Free [[id number]]  (Normal) Collected:  05/15/19 0232    Specimen:  Blood Updated:  05/15/19 0316     Free T4 1.30 ng/dL     TSH [[id number]]  (Normal) Collected:  05/15/19 0232    Specimen:  Blood Updated:  05/15/19 0316     TSH 1.120 mIU/mL     Basic Metabolic Panel [[id number]]  (Abnormal) Collected:  05/15/19 0232    Specimen:  Blood Updated:  05/15/19 0311     Glucose 112 mg/dL      BUN 15 mg/dL      Creatinine 1.15 mg/dL      Sodium 137 mmol/L      Potassium 4.0 mmol/L      Chloride 103 mmol/L      CO2 19.0 mmol/L      Calcium 8.1 mg/dL      eGFR Non African Amer 62 mL/min/1.73      BUN/Creatinine Ratio 13.0     Anion Gap 15.0 mmol/L     Narrative:       GFR Normal >60  Chronic Kidney Disease <60  Kidney Failure <15    Magnesium [[id number]]  (Normal) Collected:  05/15/19 0232    Specimen:  Blood Updated:  05/15/19 0311     Magnesium 1.6 mg/dL     Lactic Acid, Plasma [[id number]]  (Normal) Collected:  05/15/19 0232    Specimen:  Blood Updated:  05/15/19 0306     Lactate 0.8 mmol/L     CBC Auto Differential [[id number]]  (Abnormal) Collected:  05/15/19 0232    Specimen:  Blood Updated:  05/15/19 0246     WBC 7.92 10*3/mm3      RBC 4.22 10*6/mm3      Hemoglobin 12.2 g/dL      Hematocrit 36.7 %      MCV 87.0 fL      MCH 28.9 pg      MCHC 33.2 g/dL      RDW 13.3 %      RDW-SD 41.6 fl      MPV 11.4 fL      Platelets 276 10*3/mm3      Neutrophil % 47.1 %      Lymphocyte % 30.4 %      Monocyte % 15.9 %      Eosinophil % 4.3 %      Basophil % 1.0 %      Immature Grans % 1.3 %      Neutrophils, Absolute 3.73 10*3/mm3      Lymphocytes, Absolute 2.41 10*3/mm3      Monocytes, Absolute 1.26 10*3/mm3      Eosinophils, Absolute 0.34 10*3/mm3      Basophils, Absolute 0.08 10*3/mm3      Immature Grans, Absolute 0.10 10*3/mm3      nRBC 0.0  /100 WBC     Procalcitonin [[id number]] Collected:  05/15/19 0232    Specimen:  Blood Updated:  05/15/19 0243    Urinalysis With Microscopic If Indicated (No Culture) - Urine, Clean Catch [[id number]]  (Abnormal) Collected:  05/14/19 2141    Specimen:  Urine, Clean Catch Updated:  05/14/19 2151     Color, UA Yellow     Appearance, UA Clear     pH, UA 5.5     Specific Gravity, UA 1.018     Glucose, UA Negative     Ketones, UA 40 mg/dL (2+)     Bilirubin, UA Moderate (2+)     Blood, UA Negative     Protein, UA Negative     Leuk Esterase, UA Negative     Nitrite, UA Negative     Urobilinogen, UA 1.0 E.U./dL    Narrative:       Urine microscopic not indicated.    Troponin [[id number]]  (Abnormal) Collected:  05/14/19 2024    Specimen:  Blood Updated:  05/14/19 2104     Troponin T 0.072 ng/mL     Narrative:       Troponin T Reference Range:  <= 0.03 ng/mL-   Negative for AMI  >0.03 ng/mL-     Abnormal for myocardial necrosis.  Clinicians would have to utilize clinical acumen, EKG, Troponin and serial changes to determine if it is an Acute Myocardial Infarction or myocardial injury due to an underlying chronic condition.     Comprehensive Metabolic Panel [[id number]]  (Abnormal) Collected:  05/14/19 2024    Specimen:  Blood Updated:  05/14/19 2102     Glucose 79 mg/dL      BUN 18 mg/dL      Creatinine 1.26 mg/dL      Sodium 135 mmol/L      Potassium 4.1 mmol/L      Chloride 99 mmol/L      CO2 20.0 mmol/L      Calcium 8.3 mg/dL      Total Protein 6.4 g/dL      Albumin 2.90 g/dL      ALT (SGPT) 5 U/L      AST (SGOT) 16 U/L      Alkaline Phosphatase 83 U/L      Total Bilirubin 0.6 mg/dL      eGFR Non African Amer 55 mL/min/1.73      Globulin 3.5 gm/dL      A/G Ratio 0.8 g/dL      BUN/Creatinine Ratio 14.3     Anion Gap 16.0 mmol/L     Narrative:       GFR Normal >60  Chronic Kidney Disease <60  Kidney Failure <15    Lipase [[id number]]  (Abnormal) Collected:  05/14/19 2024    Specimen:  Blood Updated:  05/14/19 2048     Lipase 80  U/L     CBC & Differential [[id number]] Collected:  05/14/19 2024    Specimen:  Blood Updated:  05/14/19 2034    Narrative:       The following orders were created for panel order CBC & Differential.  Procedure                               Abnormality         Status                     ---------                               -----------         ------                     CBC Auto Differential[[id number]]        Abnormal            Final result                 Please view results for these tests on the individual orders.    CBC Auto Differential [[id number]]  (Abnormal) Collected:  05/14/19 2024    Specimen:  Blood Updated:  05/14/19 2034     WBC 10.16 10*3/mm3      RBC 4.43 10*6/mm3      Hemoglobin 12.8 g/dL      Hematocrit 38.2 %      MCV 86.2 fL      MCH 28.9 pg      MCHC 33.5 g/dL      RDW 13.2 %      RDW-SD 41.9 fl      MPV 10.7 fL      Platelets 300 10*3/mm3      Neutrophil % 49.0 %      Lymphocyte % 29.1 %      Monocyte % 15.9 %      Eosinophil % 3.5 %      Basophil % 0.8 %      Immature Grans % 1.7 %      Neutrophils, Absolute 4.97 10*3/mm3      Lymphocytes, Absolute 2.96 10*3/mm3      Monocytes, Absolute 1.62 10*3/mm3      Eosinophils, Absolute 0.36 10*3/mm3      Basophils, Absolute 0.08 10*3/mm3      Immature Grans, Absolute 0.17 10*3/mm3      nRBC 0.0 /100 WBC         Imaging Results (last 24 hours)     Procedure Component Value Units Date/Time    CT Abdomen Pelvis With Contrast [[id number]] Collected:  05/14/19 2211     Updated:  05/14/19 2251    Narrative:       Exam: CT abdomen  pelvis with contrast.    INDICATION: Weakness, history of lung carcinoma    TECHNIQUE: Routine CT abdomen pelvis with contrast. Sagittal  coronal reconstructions were obtained. This exam was performed  according to our departmental dose-optimization program, which  includes automated exposure control, adjustment of the mA and/or  kV according to patient size and/or use of iterative  reconstruction technique.    FINDINGS: Pulmonary  emphysematous changes are present. Mild  atelectasis is present in the posterior aspects of the lower  lungs.    The liver, spleen, pancreas and adrenal glands gallbladder and  kidneys unremarkable. No urinary tract calculus or  hydronephrosis.    There is mild dilatation of infrarenal abdominal aorta measuring  up to 2.8 cm. No significant adenopathy. No bowel obstruction or  abnormal bowel wall thickening. Diverticula disease is present.  No evidence of diverticulitis. Normal appendix. No abnormal  stranding in the mesentery or ascites.    Bladder is unremarkable.    The degenerative changes present in the spine.      Impression:       1. No obvious acute abnormality   2. Diverticulosis  3. Dilatation of the infrarenal abdominal aorta measuring up to  2.8 cm. AAA Size: Follow-up Recommendation (1):  2.6 - 2.9 cm   Every 5 years (2)  3.0 - 3.4 cm   Every 3 years  3.5 - 3.9 cm   Every 12 months  4.0 - 4.4 cm   Every 12 months, vasc consult rec  4.5 - 5.4 cm   Every 6 months, vasc consult rec  >=5.5 cm   Referral to vascular surgeon recommended  ______________________________________________________  (1)Based upon the Society for Vascular Surgery Guidelines:   J Vasc Surg. 2009 Oct;50(4 Suppl):S2-49   (2)For aortas of max dung of 2.6-2.9 cm that meet criteria for AAA    (>= 1.5 x proximal normal segment)  4. Pulmonary emphysema    Electronically signed by:  Duke Boateng MD  5/14/2019 10:50 PM  CDT Workstation: [id number]    [facility] Chest 1 View [[id number]] Collected:  05/14/19 2004     Updated:  05/14/19 2039    Narrative:         EXAM:         Radiograph(s), Chest   VIEWS:   Frontal  ; 1       DATE/TIME:  5/14/2019 8:37 PM CDT                INDICATION:   weakness, history of lung cancer    COMPARISON:  CXR: 10/19/18             FINDINGS:             - lines/tubes:    none     - cardiac:         size within normal limits         - mediastinum: contour within normal limits         - lungs:         no focal air space  process, pulmonary  interstitial edema, nodule(s)/mass             - pleura:         no evidence of  fluid                  - osseous:         unremarkable for age                  - misc.:         Impression:       CONCLUSION:        1. No evidence of an active cardiopulmonary process.                                                              Electronically signed by:  MAXINE Gallegos MD  5/14/2019 8:38  PM CDT Workstation: [id number]            Assessment and Plan:    Active Problems:    Elevated troponin            Rajendra Oliveira,   05/15/19  3:17 AM                    [facility] Medicine Admission      Date of Admission: 5/14/2019      Primary Care Physician: Thomas Pierson MD      Chief Complaint: Weakness    HPI: The patient is a 78-year-old male with history of lung cancer and decreased appetite on Megace that presented to the emergency room with complaints of 3 days of generalized weakness in association with several episodes of nausea and vomiting.  The patient has undergone CAT scan of the abdomen and pelvis in the ED which showed 2 cm AAA.  Troponin is 0.072 denies shortness of air or chest pain.    Denies fevers, chills, sweats, apnea, PND, abdominal pain, constipation or diarrhea    Concurrent Medical History:  has a past medical history of Allergic rhinitis, Allergic rhinitis due to pollen, Anxiety state, Atopic dermatitis, Backache, Chronic obstructive lung disease (CMS/HCC), Common cold, Constipation, Contact dermatitis, Cough, Diarrhea, Drug therapy, Dyspnea, Exanthematous disorder, Hypoxia, Itch of skin, Malaise and fatigue, Male erectile disorder, Nocturia, Osteoarthritis, Pain in joint, ankle and foot, Rash, Screening for malignant neoplasm of prostate, and Upper respiratory infection.    Past Surgical History:  has a past surgical history that includes Cataract Extraction (Left, 12/09/2003) and Injection of Medication (09/09/2015).    Family  History: family history is not on file.     Social History:  reports that he has quit smoking. He has quit using smokeless tobacco. He reports that he does not drink alcohol or use drugs.    Allergies: No Known Allergies    Medications:   Prior to Admission medications    Medication Sig Start Date End Date Taking? Authorizing Provider   predniSONE (DELTASONE) 10 MG (21) tablet pack Use as directed on package 4/16/19  Yes Indio Larsen MD   acetaminophen (TYLENOL) 325 MG tablet Take 2 tablets by mouth Every 4 (Four) Hours As Needed for Mild Pain . 6/27/18   Wolfgang Rabago MD   albuterol (PROVENTIL) (2.5 MG/3ML) 0.083% nebulizer solution Take 2.5 mg by nebulization 4 (Four) Times a Day. Inhale via nebulizer four times daily as needed. 6/4/18   Thomas Pierson MD   ammonium lactate (LAC-HYDRIN) 12 % lotion Apply  topically. ammonium lactate 12 % lotion  -  Apply as directed 2 times per day as needed.     Provider, MD Rose   busPIRone (BUSPAR) 5 MG tablet Take 1 tablet by mouth 3 (Three) Times a Day. 2/4/19   Thomas Pierson MD   cyproheptadine (PERIACTIN) 4 MG tablet Take 1 tablet by mouth 3 (Three) Times a Day As Needed for Allergies. 8/3/18   Indio Larsen MD   cyproheptadine (PERIACTIN) 4 MG tablet Take 1 tablet by mouth 3 (Three) Times a Day As Needed for Allergies. 4/16/19   Indio Larsen MD   famotidine (PEPCID) 40 MG tablet Take 1 tablet by mouth Daily. 8/2/18   Thomas Pierson MD   furosemide (LASIX) 40 MG tablet Take 1 tablet by mouth Daily. 1 tablet daily as needed for swelling 12/4/18   Indio Larsen MD   hydrOXYzine (ATARAX) 25 MG tablet Take 1 tablet by mouth Every 6 (Six) Hours As Needed for Itching. 12/19/17   Thomas Pierson MD   ipratropium (ATROVENT) 0.02 % nebulizer solution Take 2.5 mL by nebulization 4 (Four) Times a Day. 6/4/18   Thomas Pierson MD   ipratropium-albuterol (DUO-NEB) 0.5-2.5 mg/3 ml nebulizer Take 3 mL by nebulization Every 4 (Four) Hours As Needed for  Wheezing. 6/27/18   Wolfgang Rabago MD   loratadine (CLARITIN) 10 MG tablet Take 1 tablet by mouth Daily. 6/5/18   Thomas Pierson MD   megestrol (MEGACE ORAL) 40 MG/ML suspension Take 10 mL by mouth Daily. 7/9/18   Thomas Pierson MD   methylphenidate (RITALIN) 10 MG tablet Take  by mouth. Take 1 or 2 tablets daily.    ProviderRose MD   modafinil (PROVIGIL) 200 MG tablet TAKE ONE TABLET BY MOUTH ONCE DAILY 7/25/18   Thomas Pierson MD   nystatin (MYCOSTATIN) 335107 UNIT/ML suspension Swish and swallow 5 mL 4 (Four) Times a Day. Swish for 30 seconds then swallow 7/2/18   Thomas Pierson MD   oxyCODONE-acetaminophen (PERCOCET) 5-325 MG per tablet Take 1 tablet by mouth Every 4 (Four) Hours As Needed for Moderate Pain . 8/23/18   Thomas Pierson MD   sildenafil (VIAGRA) 100 MG tablet 1/2 to 1 tablet 30 min up to 4 hours before sex. 1/22/18   Thomas Pierson MD   traMADol (ULTRAM) 50 MG tablet Take 1 tablet by mouth Every 6 (Six) Hours As Needed for Moderate Pain . 10/17/18   Thomas Pierson MD   traZODone (DESYREL) 50 MG tablet Take 1-3 tablets by mouth Every Night. 12/3/18   Thomas Pierson MD   umeclidinium-vilanterol (ANORO ELLIPTA) 62.5-25 MCG/INH aerosol powder  inhaler Inhale 1 puff Daily. 5/29/18   Thomas Pierson MD   naproxen (NAPROSYN) 375 MG tablet Take 1 tablet by mouth 2 (Two) Times a Day As Needed for Mild Pain . 3/19/19 5/15/19  Thomas Pierson MD       Review of Systems:  As per history of present illness and a complete 12 point review of systems is otherwise negative.     Physical Exam:   Temp:  [97.9 °F (36.6 °C)-98.8 °F (37.1 °C)] 97.9 °F (36.6 °C)  Heart Rate:  [79-94] 79  Resp:  [16] 16  BP: (113-183)/(57-85) 115/58    Gen.: Appears as stated age.  No acute distress.  HEENT: EOMI.  PERRLA.  Sclera anicteric.  Moist mucous membranes.  Neck: Supple.  No JVD.  No thyromegaly.  Chest: Clear to auscultation bilaterally.  No wheeze, rhonchi or rales.  Heart: Regular rhythm and rate.   No murmurs, rubs or gallops.  Abdomen: Normoactive bowel sounds.  Nontender.  Nondistended.  No guarding. Neurological: Cranial nerves II through XII are grossly intact.  No cerebellar signs.   Extremities: Good range of motion throughout.  No cyanosis, clubbing or edema.  Skin: Warm.  No rashes.  No ulcers.  Psychiatry: Cooperative.        Results Reviewed:  I have personally reviewed current lab, radiology, and data and agree with results.  Lab Results (last 24 hours)     Procedure Component Value Units Date/Time    Ferritin [[id number]]  (Normal) Collected:  05/15/19 0232    Specimen:  Blood Updated:  05/15/19 0316     Ferritin 399.20 ng/mL     T4, Free [[id number]]  (Normal) Collected:  05/15/19 0232    Specimen:  Blood Updated:  05/15/19 0316     Free T4 1.30 ng/dL     TSH [[id number]]  (Normal) Collected:  05/15/19 0232    Specimen:  Blood Updated:  05/15/19 0316     TSH 1.120 mIU/mL     Basic Metabolic Panel [[id number]]  (Abnormal) Collected:  05/15/19 0232    Specimen:  Blood Updated:  05/15/19 0311     Glucose 112 mg/dL      BUN 15 mg/dL      Creatinine 1.15 mg/dL      Sodium 137 mmol/L      Potassium 4.0 mmol/L      Chloride 103 mmol/L      CO2 19.0 mmol/L      Calcium 8.1 mg/dL      eGFR Non African Amer 62 mL/min/1.73      BUN/Creatinine Ratio 13.0     Anion Gap 15.0 mmol/L     Narrative:       GFR Normal >60  Chronic Kidney Disease <60  Kidney Failure <15    Magnesium [[id number]]  (Normal) Collected:  05/15/19 0232    Specimen:  Blood Updated:  05/15/19 0311     Magnesium 1.6 mg/dL     Lactic Acid, Plasma [[id number]]  (Normal) Collected:  05/15/19 0232    Specimen:  Blood Updated:  05/15/19 0306     Lactate 0.8 mmol/L     CBC Auto Differential [[id number]]  (Abnormal) Collected:  05/15/19 0232    Specimen:  Blood Updated:  05/15/19 0246     WBC 7.92 10*3/mm3      RBC 4.22 10*6/mm3      Hemoglobin 12.2 g/dL      Hematocrit 36.7 %      MCV 87.0 fL      MCH 28.9 pg      MCHC 33.2 g/dL      RDW 13.3 %       RDW-SD 41.6 fl      MPV 11.4 fL      Platelets 276 10*3/mm3      Neutrophil % 47.1 %      Lymphocyte % 30.4 %      Monocyte % 15.9 %      Eosinophil % 4.3 %      Basophil % 1.0 %      Immature Grans % 1.3 %      Neutrophils, Absolute 3.73 10*3/mm3      Lymphocytes, Absolute 2.41 10*3/mm3      Monocytes, Absolute 1.26 10*3/mm3      Eosinophils, Absolute 0.34 10*3/mm3      Basophils, Absolute 0.08 10*3/mm3      Immature Grans, Absolute 0.10 10*3/mm3      nRBC 0.0 /100 WBC     Procalcitonin [[id number]] Collected:  05/15/19 0232    Specimen:  Blood Updated:  05/15/19 0243    Urinalysis With Microscopic If Indicated (No Culture) - Urine, Clean Catch [[id number]]  (Abnormal) Collected:  05/14/19 2141    Specimen:  Urine, Clean Catch Updated:  05/14/19 2151     Color, UA Yellow     Appearance, UA Clear     pH, UA 5.5     Specific Gravity, UA 1.018     Glucose, UA Negative     Ketones, UA 40 mg/dL (2+)     Bilirubin, UA Moderate (2+)     Blood, UA Negative     Protein, UA Negative     Leuk Esterase, UA Negative     Nitrite, UA Negative     Urobilinogen, UA 1.0 E.U./dL    Narrative:       Urine microscopic not indicated.    Troponin [[id number]]  (Abnormal) Collected:  05/14/19 2024    Specimen:  Blood Updated:  05/14/19 2104     Troponin T 0.072 ng/mL     Narrative:       Troponin T Reference Range:  <= 0.03 ng/mL-   Negative for AMI  >0.03 ng/mL-     Abnormal for myocardial necrosis.  Clinicians would have to utilize clinical acumen, EKG, Troponin and serial changes to determine if it is an Acute Myocardial Infarction or myocardial injury due to an underlying chronic condition.     Comprehensive Metabolic Panel [[id number]]  (Abnormal) Collected:  05/14/19 2024    Specimen:  Blood Updated:  05/14/19 2102     Glucose 79 mg/dL      BUN 18 mg/dL      Creatinine 1.26 mg/dL      Sodium 135 mmol/L      Potassium 4.1 mmol/L      Chloride 99 mmol/L      CO2 20.0 mmol/L      Calcium 8.3 mg/dL      Total Protein 6.4 g/dL      Albumin  2.90 g/dL      ALT (SGPT) 5 U/L      AST (SGOT) 16 U/L      Alkaline Phosphatase 83 U/L      Total Bilirubin 0.6 mg/dL      eGFR Non African Amer 55 mL/min/1.73      Globulin 3.5 gm/dL      A/G Ratio 0.8 g/dL      BUN/Creatinine Ratio 14.3     Anion Gap 16.0 mmol/L     Narrative:       GFR Normal >60  Chronic Kidney Disease <60  Kidney Failure <15    Lipase [[id number]]  (Abnormal) Collected:  05/14/19 2024    Specimen:  Blood Updated:  05/14/19 2048     Lipase 80 U/L     CBC & Differential [[id number]] Collected:  05/14/19 2024    Specimen:  Blood Updated:  05/14/19 2034    Narrative:       The following orders were created for panel order CBC & Differential.  Procedure                               Abnormality         Status                     ---------                               -----------         ------                     CBC Auto Differential[[id number]]        Abnormal            Final result                 Please view results for these tests on the individual orders.    CBC Auto Differential [[id number]]  (Abnormal) Collected:  05/14/19 2024    Specimen:  Blood Updated:  05/14/19 2034     WBC 10.16 10*3/mm3      RBC 4.43 10*6/mm3      Hemoglobin 12.8 g/dL      Hematocrit 38.2 %      MCV 86.2 fL      MCH 28.9 pg      MCHC 33.5 g/dL      RDW 13.2 %      RDW-SD 41.9 fl      MPV 10.7 fL      Platelets 300 10*3/mm3      Neutrophil % 49.0 %      Lymphocyte % 29.1 %      Monocyte % 15.9 %      Eosinophil % 3.5 %      Basophil % 0.8 %      Immature Grans % 1.7 %      Neutrophils, Absolute 4.97 10*3/mm3      Lymphocytes, Absolute 2.96 10*3/mm3      Monocytes, Absolute 1.62 10*3/mm3      Eosinophils, Absolute 0.36 10*3/mm3      Basophils, Absolute 0.08 10*3/mm3      Immature Grans, Absolute 0.17 10*3/mm3      nRBC 0.0 /100 WBC         Imaging Results (last 24 hours)     Procedure Component Value Units Date/Time    CT Abdomen Pelvis With Contrast [[id number]] Collected:  05/14/19 2211     Updated:  05/14/19 2251     Narrative:       Exam: CT abdomen  pelvis with contrast.    INDICATION: Weakness, history of lung carcinoma    TECHNIQUE: Routine CT abdomen pelvis with contrast. Sagittal  coronal reconstructions were obtained. This exam was performed  according to our departmental dose-optimization program, which  includes automated exposure control, adjustment of the mA and/or  kV according to patient size and/or use of iterative  reconstruction technique.    FINDINGS: Pulmonary emphysematous changes are present. Mild  atelectasis is present in the posterior aspects of the lower  lungs.    The liver, spleen, pancreas and adrenal glands gallbladder and  kidneys unremarkable. No urinary tract calculus or  hydronephrosis.    There is mild dilatation of infrarenal abdominal aorta measuring  up to 2.8 cm. No significant adenopathy. No bowel obstruction or  abnormal bowel wall thickening. Diverticula disease is present.  No evidence of diverticulitis. Normal appendix. No abnormal  stranding in the mesentery or ascites.    Bladder is unremarkable.    The degenerative changes present in the spine.      Impression:       1. No obvious acute abnormality   2. Diverticulosis  3. Dilatation of the infrarenal abdominal aorta measuring up to  2.8 cm. AAA Size: Follow-up Recommendation (1):  2.6 - 2.9 cm   Every 5 years (2)  3.0 - 3.4 cm   Every 3 years  3.5 - 3.9 cm   Every 12 months  4.0 - 4.4 cm   Every 12 months, vasc consult rec  4.5 - 5.4 cm   Every 6 months, vasc consult rec  >=5.5 cm   Referral to vascular surgeon recommended  ______________________________________________________  (1)Based upon the Society for Vascular Surgery Guidelines:   J Vasc Surg. 2009 Oct;50(4 Suppl):S2-49   (2)For aortas of max dung of 2.6-2.9 cm that meet criteria for AAA    (>= 1.5 x proximal normal segment)  4. Pulmonary emphysema    Electronically signed by:  Duke Boateng MD  5/14/2019 10:50 PM  CDT Workstation: [employer] Chest 1 View [[id number]]  Collected:  05/14/19 2004     Updated:  05/14/19 2039    Narrative:         EXAM:         Radiograph(s), Chest   VIEWS:   Frontal  ; 1       DATE/TIME:  5/14/2019 8:37 PM CDT                INDICATION:   weakness, history of lung cancer    COMPARISON:  CXR: 10/19/18             FINDINGS:             - lines/tubes:    none     - cardiac:         size within normal limits         - mediastinum: contour within normal limits         - lungs:         no focal air space process, pulmonary  interstitial edema, nodule(s)/mass             - pleura:         no evidence of  fluid                  - osseous:         unremarkable for age                  - misc.:         Impression:       CONCLUSION:        1. No evidence of an active cardiopulmonary process.                                                              Electronically signed by:  MAXINE Gallegos MD  5/14/2019 8:38  PM CDT Workstation: [id number]            Assessment and Plan:    Active Problems:    1.  Generalized weakness: Likely multifactorial from lung cancer and from decreased oral nutritional intake.  2.  Moderate protein calorie malnutrition: Secondary to lung cancer  --Nutrition consult  3.  Starvation ketosis: UA shows ketones in urine.  Encouraged oral intake          Rajendra Oliveira DO  05/15/19  3:17 AM

## 2019-05-15 NOTE — NURSING NOTE
Patient has 2 wounds on his right foot. See assessments for details. Pulses were not palpable. Doppler dorsalis pedis and tibial sound biphasic at best. -Needs protection and wound care. POA yes.Patient has been seen by Dr Thomas Boateng for this foot wound in the past . Needs to follow up with him.

## 2019-05-15 NOTE — ED NOTES
Pt arrives via EMS from home with c/o nausea and vomiting past 3-5 days. Pt states he has not eaten during the same amount of time; Pt has hx of lung cx; Pt states he is weak which has decreased his ADL's; Pt reports no diarrhea and is voiding regularly and reports it's brown in color; Pt reports taking Prednisone for his appetite but has not been able to take it due to he ran out.     Kenneth Pierce, RN  05/14/19 1930       Kenneth Pierce, FRANCISCO  05/14/19 1950

## 2019-05-15 NOTE — SIGNIFICANT NOTE
72-year-old  male presented with weakness.  Patient had a CT scan done last year that showed some lymph nodes concerning for metastatic disease.  Had subsequent PET scan that showed lymph nodes concerning for neoplastic involvement.  Patient is being followed by Dr. Larsen and family were told that biopsy of the lesion cannot be done secondary to his severe COPD/worsening respiratory status.  Family is convinced that patient has metastatic cancer with the lung mass.  Discussed with Dr. Guerra -oncologist and he recommended outpatient follow-up with him and he can try to get a biopsy of the lymph node to come up with a definitive diagnosis.

## 2019-05-15 NOTE — PLAN OF CARE
Problem: Patient Care Overview  Goal: Plan of Care Review  Outcome: Ongoing (interventions implemented as appropriate)   05/15/19 0336   Coping/Psychosocial   Plan of Care Reviewed With patient   Plan of Care Review   Progress improving   OTHER   Outcome Summary pt has had an appetite requesting food and drink, no c/o chest pain tonight, VSS, will continue to monitor       Problem: Cardiac: ACS (Acute Coronary Syndrome) (Adult)  Goal: Signs and Symptoms of Listed Potential Problems Will be Absent, Minimized or Managed (Cardiac: ACS)  Outcome: Ongoing (interventions implemented as appropriate)      Problem: Fall Risk (Adult)  Goal: Identify Related Risk Factors and Signs and Symptoms  Outcome: Ongoing (interventions implemented as appropriate)    Goal: Absence of Fall  Outcome: Ongoing (interventions implemented as appropriate)      Problem: Skin Injury Risk (Adult)  Goal: Identify Related Risk Factors and Signs and Symptoms  Outcome: Ongoing (interventions implemented as appropriate)    Goal: Skin Health and Integrity  Outcome: Ongoing (interventions implemented as appropriate)      Problem: Hospitalized Acutely Ill Older (Adult)  Goal: Signs and Symptoms of Listed Potential Problems Will be Absent, Minimized or Managed (Hospitalized Acutely Ill Older)  Outcome: Ongoing (interventions implemented as appropriate)

## 2019-05-15 NOTE — PLAN OF CARE
Problem: Patient Care Overview  Goal: Plan of Care Review   05/15/19 1300   Coping/Psychosocial   Plan of Care Reviewed With patient;spouse   OTHER   Outcome Summary PT evaluation completed.Pt transferred with CGA and ambulated 5ft, 20ft with CGA. O2 sats dropped to 86% on 1.5 lpm with ambulation with recovery to 92% in 45seconds. Pt complained of lightheadedness that resolved once O2 sats improved. Function limited by decreased strength, balance, and tolerance for functional mobility and activities. Pt will benefit from PT to regain lost function as pt improves medically. If patient discharged home prior to goals being met, Pt will benefit from 24/7 assist with HH therapy.

## 2019-05-15 NOTE — DISCHARGE SUMMARY
"    [facility] Emergency Room Medicine Services  DISCHARGE SUMMARY       Date of Admission: 5/14/2019  Date of Discharge:  5/15/2019  Primary Care Physician: Thomas Pierson MD    Presenting Problem/History of Present Illness:  Weakness [R53.1]  Elevated troponin [R74.8]  Abdominal aortic aneurysm (AAA) without rupture (CMS/HCC) [I71.4]  Non-intractable vomiting with nausea, unspecified vomiting type [R11.2]     Final Discharge Diagnoses:  Active Hospital Problems    Diagnosis   • Fatigue   • Elevated troponin   • Chronic obstructive pulmonary disease (CMS/HCC)       Consults:   Consults     No orders found for last 30 day(s).          Pertinent Test Results:   Lab Results (last 24 hours)     Procedure Component Value Units Date/Time    Procalcitonin [[id number]]  (Normal) Collected:  05/15/19 0232    Specimen:  Blood Updated:  05/15/19 1211     Procalcitonin 0.14 ng/mL     Narrative:       As a Marker for Sepsis (Non-Neonates):   1. <0.5 ng/mL represents a low risk of severe sepsis and/or septic shock.  1. >2 ng/mL represents a high risk of severe sepsis and/or septic shock.    As a Marker for Lower Respiratory Tract Infections that require antibiotic therapy:  PCT on Admission     Antibiotic Therapy             6-12 Hrs later  > 0.5                Strongly Recommended            >0.25 - <0.5         Recommended  0.1 - 0.25           Discouraged                   Remeasure/reassess PCT  <0.1                 Strongly Discouraged          Remeasure/reassess PCT      As 28 day mortality risk marker: \"Change in Procalcitonin Result\" (> 80 % or <=80 %) if Day 0 (or Day 1) and Day 4 values are available. Refer to http://www.[employer]-pct-calculator.com/   Change in PCT <=80 %   A decrease of PCT levels below or equal to 80 % defines a positive change in PCT test result representing a higher risk for 28-day all-cause mortality of patients diagnosed with severe sepsis or septic shock.  Change in PCT > " 80 %   A decrease of PCT levels of more than 80 % defines a negative change in PCT result representing a lower risk for 28-day all-cause mortality of patients diagnosed with severe sepsis or septic shock.                Ferritin [[id number]]  (Normal) Collected:  05/15/19 0232    Specimen:  Blood Updated:  05/15/19 0316     Ferritin 399.20 ng/mL     T4, Free [[id number]]  (Normal) Collected:  05/15/19 0232    Specimen:  Blood Updated:  05/15/19 0316     Free T4 1.30 ng/dL     TSH [[id number]]  (Normal) Collected:  05/15/19 0232    Specimen:  Blood Updated:  05/15/19 0316     TSH 1.120 mIU/mL     Basic Metabolic Panel [[id number]]  (Abnormal) Collected:  05/15/19 0232    Specimen:  Blood Updated:  05/15/19 0311     Glucose 112 mg/dL      BUN 15 mg/dL      Creatinine 1.15 mg/dL      Sodium 137 mmol/L      Potassium 4.0 mmol/L      Chloride 103 mmol/L      CO2 19.0 mmol/L      Calcium 8.1 mg/dL      eGFR Non African Amer 62 mL/min/1.73      BUN/Creatinine Ratio 13.0     Anion Gap 15.0 mmol/L     Narrative:       GFR Normal >60  Chronic Kidney Disease <60  Kidney Failure <15    Magnesium [[id number]]  (Normal) Collected:  05/15/19 0232    Specimen:  Blood Updated:  05/15/19 0311     Magnesium 1.6 mg/dL     Lactic Acid, Plasma [[id number]]  (Normal) Collected:  05/15/19 0232    Specimen:  Blood Updated:  05/15/19 0306     Lactate 0.8 mmol/L     CBC Auto Differential [[id number]]  (Abnormal) Collected:  05/15/19 0232    Specimen:  Blood Updated:  05/15/19 0246     WBC 7.92 10*3/mm3      RBC 4.22 10*6/mm3      Hemoglobin 12.2 g/dL      Hematocrit 36.7 %      MCV 87.0 fL      MCH 28.9 pg      MCHC 33.2 g/dL      RDW 13.3 %      RDW-SD 41.6 fl      MPV 11.4 fL      Platelets 276 10*3/mm3      Neutrophil % 47.1 %      Lymphocyte % 30.4 %      Monocyte % 15.9 %      Eosinophil % 4.3 %      Basophil % 1.0 %      Immature Grans % 1.3 %      Neutrophils, Absolute 3.73 10*3/mm3      Lymphocytes, Absolute 2.41 10*3/mm3      Monocytes,  Absolute 1.26 10*3/mm3      Eosinophils, Absolute 0.34 10*3/mm3      Basophils, Absolute 0.08 10*3/mm3      Immature Grans, Absolute 0.10 10*3/mm3      nRBC 0.0 /100 WBC     Urinalysis With Microscopic If Indicated (No Culture) - Urine, Clean Catch [[id number]]  (Abnormal) Collected:  05/14/19 2141    Specimen:  Urine, Clean Catch Updated:  05/14/19 2151     Color, UA Yellow     Appearance, UA Clear     pH, UA 5.5     Specific Gravity, UA 1.018     Glucose, UA Negative     Ketones, UA 40 mg/dL (2+)     Bilirubin, UA Moderate (2+)     Blood, UA Negative     Protein, UA Negative     Leuk Esterase, UA Negative     Nitrite, UA Negative     Urobilinogen, UA 1.0 E.U./dL    Narrative:       Urine microscopic not indicated.    Troponin [[id number]]  (Abnormal) Collected:  05/14/19 2024    Specimen:  Blood Updated:  05/14/19 2104     Troponin T 0.072 ng/mL     Narrative:       Troponin T Reference Range:  <= 0.03 ng/mL-   Negative for AMI  >0.03 ng/mL-     Abnormal for myocardial necrosis.  Clinicians would have to utilize clinical acumen, EKG, Troponin and serial changes to determine if it is an Acute Myocardial Infarction or myocardial injury due to an underlying chronic condition.     Comprehensive Metabolic Panel [[id number]]  (Abnormal) Collected:  05/14/19 2024    Specimen:  Blood Updated:  05/14/19 2102     Glucose 79 mg/dL      BUN 18 mg/dL      Creatinine 1.26 mg/dL      Sodium 135 mmol/L      Potassium 4.1 mmol/L      Chloride 99 mmol/L      CO2 20.0 mmol/L      Calcium 8.3 mg/dL      Total Protein 6.4 g/dL      Albumin 2.90 g/dL      ALT (SGPT) 5 U/L      AST (SGOT) 16 U/L      Alkaline Phosphatase 83 U/L      Total Bilirubin 0.6 mg/dL      eGFR Non African Amer 55 mL/min/1.73      Globulin 3.5 gm/dL      A/G Ratio 0.8 g/dL      BUN/Creatinine Ratio 14.3     Anion Gap 16.0 mmol/L     Narrative:       GFR Normal >60  Chronic Kidney Disease <60  Kidney Failure <15    Lipase [[id number]]  (Abnormal) Collected:  05/14/19  2024    Specimen:  Blood Updated:  05/14/19 2048     Lipase 80 U/L     CBC & Differential [[id number]] Collected:  05/14/19 2024    Specimen:  Blood Updated:  05/14/19 2034    Narrative:       The following orders were created for panel order CBC & Differential.  Procedure                               Abnormality         Status                     ---------                               -----------         ------                     CBC Auto Differential[[id number]]        Abnormal            Final result                 Please view results for these tests on the individual orders.    CBC Auto Differential [[id number]]  (Abnormal) Collected:  05/14/19 2024    Specimen:  Blood Updated:  05/14/19 2034     WBC 10.16 10*3/mm3      RBC 4.43 10*6/mm3      Hemoglobin 12.8 g/dL      Hematocrit 38.2 %      MCV 86.2 fL      MCH 28.9 pg      MCHC 33.5 g/dL      RDW 13.2 %      RDW-SD 41.9 fl      MPV 10.7 fL      Platelets 300 10*3/mm3      Neutrophil % 49.0 %      Lymphocyte % 29.1 %      Monocyte % 15.9 %      Eosinophil % 3.5 %      Basophil % 0.8 %      Immature Grans % 1.7 %      Neutrophils, Absolute 4.97 10*3/mm3      Lymphocytes, Absolute 2.96 10*3/mm3      Monocytes, Absolute 1.62 10*3/mm3      Eosinophils, Absolute 0.36 10*3/mm3      Basophils, Absolute 0.08 10*3/mm3      Immature Grans, Absolute 0.17 10*3/mm3      nRBC 0.0 /100 WBC         Imaging Results (last 24 hours)     Procedure Component Value Units Date/Time    CT Chest With Contrast [[id number]] Collected:  05/15/19 1321     Updated:  05/15/19 1553    Narrative:       [facility]    Patient Name: ERA MEJIA    ORDERING: NICOLE CARTWRIGHT    ATTENDING: OTILIO BEYER     REFERRING: NICOLE CARTWRIGHT    -----------------------    PROCEDURE: CT SCAN OF THE CHEST WITH INTRAVENOUS CONTRAST.    CLINICAL INFORMATION:  recheck lung mass, Trauma to chest last  week., R74.8 Abnormal levels of other serum enzymes R11.2 Nausea  with vomiting,  unspecified R53.1 Weakness I71.4 Abdominal aortic  aneurysm, without rupture Z74.09 Other reduced mobility Z74.09  Other reduced mobility     This exam was performed using radiation doses that are as low as  reasonably achievable (ALARA).  This exam was performed according to our departmental dose  optimization program, which includes automated exposure control,  adjustment of the mA and/or KV according to patient size and/or  use of iterative reconstruction technique.    COMPARISON: PET/CT dated 7/13/2018. Chest CTA dated 6/25/2018.    CONTRAST: 80 cc intravenous Isovue 300    TECHNIQUE: Axial images were obtained and multiplanar  reconstructions were made.      FINDINGS:  LUNGS/PLEURA: There are moderately severe emphysematous changes.  No suspicious lung nodule is seen.  TRACHEA AND BRONCHI:  The trachea and bronchi are patent.  MEDIASTINUM, MARK AND LYMPH NODES: There is a stable enlarged  subcarinal lymph node measuring 2.4 cm in short axis without  significant change in appearance compared to the prior exams.  HEART AND PERICARDIUM: The heart and pericardium are normal.  VASCULAR: Unremarkable  UPPER ABDOMEN: Unremarkable  OSSEOUS STRUCTURES: No acute osseous abnormality is seen. There  are degenerative changes of the lower thoracic and lower cervical  spine.      Impression:       CONCLUSION:  No suspicious lung nodule.  Moderately severe emphysematous changes.  Significantly enlarged subcarinal lymph node which appears stable  compared to prior exams.    Electronically signed by:  Joao Monte MD  5/15/2019 3:50 PM CDT  Workstation: [id number]    CT Abdomen Pelvis With Contrast [[id number]] Collected:  05/14/19 2211     Updated:  05/14/19 2251    Narrative:       Exam: CT abdomen  pelvis with contrast.    INDICATION: Weakness, history of lung carcinoma    TECHNIQUE: Routine CT abdomen pelvis with contrast. Sagittal  coronal reconstructions were obtained. This exam was performed  according to our departmental  dose-optimization program, which  includes automated exposure control, adjustment of the mA and/or  kV according to patient size and/or use of iterative  reconstruction technique.    FINDINGS: Pulmonary emphysematous changes are present. Mild  atelectasis is present in the posterior aspects of the lower  lungs.    The liver, spleen, pancreas and adrenal glands gallbladder and  kidneys unremarkable. No urinary tract calculus or  hydronephrosis.    There is mild dilatation of infrarenal abdominal aorta measuring  up to 2.8 cm. No significant adenopathy. No bowel obstruction or  abnormal bowel wall thickening. Diverticula disease is present.  No evidence of diverticulitis. Normal appendix. No abnormal  stranding in the mesentery or ascites.    Bladder is unremarkable.    The degenerative changes present in the spine.      Impression:       1. No obvious acute abnormality   2. Diverticulosis  3. Dilatation of the infrarenal abdominal aorta measuring up to  2.8 cm. AAA Size: Follow-up Recommendation (1):  2.6 - 2.9 cm   Every 5 years (2)  3.0 - 3.4 cm   Every 3 years  3.5 - 3.9 cm   Every 12 months  4.0 - 4.4 cm   Every 12 months, vasc consult rec  4.5 - 5.4 cm   Every 6 months, vasc consult rec  >=5.5 cm   Referral to vascular surgeon recommended  ______________________________________________________  (1)Based upon the Society for Vascular Surgery Guidelines:   J Vasc Surg. 2009 Oct;50(4 Suppl):S2-49   (2)For aortas of max dung of 2.6-2.9 cm that meet criteria for AAA    (>= 1.5 x proximal normal segment)  4. Pulmonary emphysema    Electronically signed by:  Duke Boateng MD  5/14/2019 10:50 PM  CDT Workstation: [id number]    XR Chest 1 View [[id number]] Collected:  05/14/19 2004     Updated:  05/14/19 2039    Narrative:         EXAM:         Radiograph(s), Chest   VIEWS:   Frontal  ; 1       DATE/TIME:  5/14/2019 8:37 PM CDT                INDICATION:   weakness, history of lung cancer    COMPARISON:  CXR: 10/19/18  "            FINDINGS:             - lines/tubes:    none     - cardiac:         size within normal limits         - mediastinum: contour within normal limits         - lungs:         no focal air space process, pulmonary  interstitial edema, nodule(s)/mass             - pleura:         no evidence of  fluid                  - osseous:         unremarkable for age                  - misc.:         Impression:       CONCLUSION:        1. No evidence of an active cardiopulmonary process.                                                              Electronically signed by:  MAXINE Gallegos MD  5/14/2019 8:38  PM CDT Workstation: [id number]        Chief Complaint on Day of Discharge: No complaints    Hospital Course:  This is a 78-year-old  male with past medical history of anxiety and COPD that presented to [facility] on 5/14/2019 with complaints of 3 days of generalized weakness and several weeks of decreased appetite.  Patient also reports nausea and vomiting over the last several days.  Spoke with the patient and his wife about his past medical history and they state that he has a lung mass found last year and is being followed by Dr. Larsen and pulmonology.  CT of the abdomen and pelvis showed emphysematous changes with mild atelectasis in the lower lungs.  Notation of a mild dilation of infrarenal abdominal aorta measuring 2.8 cm. Wife states he was hurt a few days ago on his tractor and took a hard hit to his chest. CT of the chest shows no suspicious lung mass.  The patient has oxygen already set up at home.  Home health has been set up for physical and occupational therapy.  Follow up with PCP in one week.  Follow with Dr. Babcock in one week for reevaluation of mediastinal adenopathy.      Condition on Discharge:  Stable    Physical Exam on Discharge:  /69 (BP Location: Right arm, Patient Position: Lying)   Pulse 92   Temp 98.2 °F (36.8 °C) (Temporal)   Resp 18   Ht 180.3 cm (71\")   Wt " 58.5 kg (129 lb)   SpO2 90%   BMI 17.99 kg/m²   Physical Exam   Constitutional: He is oriented to person, place, and time. He appears well-developed and well-nourished.   HENT:   Head: Normocephalic and atraumatic.   Eyes: EOM are normal. Pupils are equal, round, and reactive to light.   Neck: Normal range of motion. Neck supple.   Cardiovascular: Normal rate and regular rhythm.   Pulmonary/Chest: Effort normal and breath sounds normal.   Abdominal: Soft. Bowel sounds are normal.   Musculoskeletal: Normal range of motion.   Neurological: He is alert and oriented to person, place, and time.   Skin: Skin is warm and dry.   Psychiatric: He has a normal mood and affect. His behavior is normal.     Discharge Disposition:  [facility]-Health [facility] [place]    Discharge Medications:     Discharge Medications      Continue These Medications      Instructions Start Date   acetaminophen 325 MG tablet  Commonly known as:  TYLENOL   650 mg, Oral, Every 4 Hours PRN      albuterol (2.5 MG/3ML) 0.083% nebulizer solution  Commonly known as:  PROVENTIL   2.5 mg, Nebulization, 4 Times Daily - RT, Inhale via nebulizer four times daily as needed.       ammonium lactate 12 % lotion  Commonly known as:  LAC-HYDRIN   Topical, ammonium lactate 12 % lotion  -  Apply as directed 2 times per day as needed.      busPIRone 5 MG tablet  Commonly known as:  BUSPAR   5 mg, Oral, 3 Times Daily      cyproheptadine 4 MG tablet  Commonly known as:  PERIACTIN   4 mg, Oral, 3 Times Daily PRN      cyproheptadine 4 MG tablet  Commonly known as:  PERIACTIN   4 mg, Oral, 3 Times Daily PRN      famotidine 40 MG tablet  Commonly known as:  PEPCID   40 mg, Oral, Daily      furosemide 40 MG tablet  Commonly known as:  LASIX   40 mg, Oral, Daily, 1 tablet daily as needed for swelling      hydrOXYzine 25 MG tablet  Commonly known as:  ATARAX   25 mg, Oral, Every 6 Hours PRN      ipratropium 0.02 % nebulizer solution  Commonly known as:  ATROVENT   500 mcg, Nebulization, 4  Times Daily - RT      ipratropium-albuterol 0.5-2.5 mg/3 ml nebulizer  Commonly known as:  DUO-NEB   3 mL, Nebulization, Every 4 Hours PRN      loratadine 10 MG tablet  Commonly known as:  CLARITIN   10 mg, Oral, Daily      megestrol 40 MG/ML suspension  Commonly known as:  MEGACE ORAL   400 mg, Oral, Daily      methylphenidate 10 MG tablet  Commonly known as:  RITALIN   Oral, Take 1 or 2 tablets daily.       modafinil 200 MG tablet  Commonly known as:  PROVIGIL   TAKE ONE TABLET BY MOUTH ONCE DAILY      nystatin 533825 UNIT/ML suspension  Commonly known as:  MYCOSTATIN   500,000 Units, Swish & Swallow, 4 Times Daily, Swish for 30 seconds then swallow      oxyCODONE-acetaminophen 5-325 MG per tablet  Commonly known as:  PERCOCET   1 tablet, Oral, Every 4 Hours PRN      predniSONE 10 MG (21) tablet pack  Commonly known as:  DELTASONE   Use as directed on package      sildenafil 100 MG tablet  Commonly known as:  VIAGRA   1/2 to 1 tablet 30 min up to 4 hours before sex.       traMADol 50 MG tablet  Commonly known as:  ULTRAM   50 mg, Oral, Every 6 Hours PRN      traZODone 50 MG tablet  Commonly known as:  DESYREL    mg, Oral, Nightly      umeclidinium-vilanterol 62.5-25 MCG/INH aerosol powder  inhaler  Commonly known as:  ANORO ELLIPTA   1 puff, Inhalation, Daily             Discharge Diet:   Diet Instructions     Diet: Cardiac      Discharge Diet:  Cardiac          Activity at Discharge:   Activity Instructions     Activity as Tolerated            Discharge Care Plan/Instructions: as above    Follow-up Appointment:  Additional Instructions for the Follow-ups that You Need to Schedule     Ambulatory Referral to Home Health   As directed      Face to Face Visit Date:  5/15/2019    Follow-up Provider for Plan of Care?:  I treated the patient in an acute care facility and will not continue treatment after discharge.    Follow-up Provider:  NIMCO KIRKPATRICK [[id number]]    Reason/Clinical Findings:  deconditioning/ copd  exacerbation    Describe mobility limitations that make leaving home difficult:  deconditioning/ copd exacerbation    Nursing/Therapeutic Services Requested:  Skilled Nursing Physical Therapy Occupational Therapy    Skilled nursing orders:  COPD management Cardiopulmonary assessments Neurovascular assessments    PT orders:  Therapeutic exercise Gait Training Strengthening Home safety assessment    Weight Bearing Status:  As Tolerated    Occupational orders:  Activities of daily living Strengthening Energy conservation Home safety assessment    Frequency:  1 Week 1           Follow-up Information     Thomas Pierson MD. Go on 5/22/2019.    Specialties:  Family Medicine, Emergency Medicine  Why:  WEDNESDAY MAY 22ND 1:00 HOSPITAL FOLLOW UP  Contact information:  [address] [facility] [id number] [phone]             [facility] HOME CARE REFERRAL Follow up.    Specialty:  Home Health Services  Contact information:  [address]  [facility] [address]           Kevin Powell MD Follow up in 1 week(s).    Specialty:  Hematology and Oncology  Why:  Enlarged lymph node/ needs follow up.   OFFICE WILL CALL TO SCHEDULE HOSPITAL FOLLOW UP  Contact information:  [address] [place] [id number] [phone]                       This document has been electronically signed by LUIS ALBERTO Cedillo on May 15, 2019 4:34 PM        Time: Greater than 30 minutes.

## 2019-05-15 NOTE — CONSULTS
"Adult Nutrition  Assessment    Patient Name:  Fuad Brumfield  YOB: 1940  MRN: [id number]  Admit Date:  5/14/2019    Assessment Date:  5/15/2019    Comments:  Pt admit with n/v and COPD- noted h/o lung ca. Pt also noted to have wounds on rt foot x2. Pt presents with severe malnutriton- aeb severe muscle loss and moderate fat loss-BMI 17.9 with -9.1% wt loss in under 6 mo (severe loss is 10% in 6mo) and 71.5% IBW. Noted Glu wnl, Alb 2.9L and H&H low. Megace 400 Qd in palce along with lasix 40Qd. Diet ed on increasing pro/linus completed w/ pt. Supplements provided. RD to follow hospital course and make recs accordinlgy.    Reason for Assessment     Row Name 05/15/19 1606          Reason for Assessment    Reason For Assessment  identified at risk by screening criteria;nurse/nurse practitioner consult     Identified At Risk by Screening Criteria  BMI;large or nonhealing wound, burn or pressure injury;unintentional loss of 10 lbs or more in the past 2 mos;reduced oral intake over the last month         Nutrition/Diet History     Row Name 05/15/19 1606          Nutrition/Diet History    Typical Food/Fluid Intake  Pt states nkfa. Denies c/s and GI issues today. Pt states appetite has \"not been real good\". He states he has always had a problem holding his weight and has always been skinny. Highest weight recently 135#, approx 5-6mo ago. (down12 #)     Food Preferences  does not drink too much milk, does like ice cream     Supplemental Drinks/Foods/Additives  He does boost all the time and is \"sick of it\"           Labs/Tests/Procedures/Meds     Row Name 05/15/19 1609          Labs/Procedures/Meds    Lab Results Reviewed  reviewed     Lab Results Comments  Glu 79/112, Alb 2.9L, H&H low        Diagnostic Tests/Procedures    Diagnostic Test/Procedure Reviewed  reviewed     Diagnostic Test/Procedures Comments  CAT scan abdomen/pelvis, CXR         Medications    Pertinent Medications Reviewed  reviewed     " Pertinent Medications Comments  lasix 40 Qd, Megace 400Qd, prednisone           Estimated/Assessed Needs     Row Name 05/15/19 1610          Calculation Measurements    Weight Used For Calculations  78 kg (172 lb)        Estimated/Assessed Needs    Additional Documentation  Calorie Requirements (Group);KCAL/KG (Group);Protein Requirements (Group);Fluid Requirements (Group)        Calorie Requirements    Estimated Calorie Requirement (kcal/day)  2000        KCAL/KG    14 Kcal/Kg (kcal)  1092.27     15 Kcal/Kg (kcal)  1170.29     18 Kcal/Kg (kcal)  1404.34     20 Kcal/Kg (kcal)  1560.38     25 Kcal/Kg (kcal)  1950.48     30 Kcal/Kg (kcal)  2340.57     35 Kcal/Kg (kcal)  2730.66     40 Kcal/Kg (kcal)  3120.76     45 Kcal/Kg (kcal)  3510.86     50 Kcal/Kg (kcal)  3900.95        McDonough-St. Jeor Equation    RMR (McDonough-St. Jeor Equation)  1522.32        Protein Requirements    Est Protein Requirement Amount (gms/kg)  1.0 gm protein     Estimated Protein Requirements (gms/day)  78.02        Fluid Requirements    Estimated Fluid Requirements (mL/day)  2000     RDA Method (mL)  2000     [place]-Segar Method (over 20 kg)  3060.38         Nutrition Prescription Ordered     Row Name 05/15/19 1617          Nutrition Prescription PO    Current PO Diet  Regular     Common Modifiers  Cardiac         Evaluation of Received Nutrient/Fluid Intake     Row Name 05/15/19 1610          Calculation Measurements    Weight Used For Calculations  78 kg (172 lb)         Evaluation of Prescribed Nutrient/Fluid Intake     Row Name 05/15/19 1610          Calculation Measurements    Weight Used For Calculations  78 kg (172 lb)             Electronically signed by:  Chelly Paredes RD  05/15/19 4:26 PM

## 2019-05-15 NOTE — CONSULTS
Malnutrition Severity Assessment    Patient Name:  Fuad Brumfield  YOB: 1940  MRN: [id number]  Admit Date:  5/14/2019    Patient meets criteria for : Severe Malnutrition    Comments:  Pt presents with severe malnutriton- aeb severe muscle loss and moderate fat loss-BMI 17.9 with -9.1% wt loss in under 6 mo (severe loss is 10% in 6mo) and 71.5% IBW. Megace 400 Qd in place. Noted h/o COPD and lung cancer and wounds on right foot x2 Diet ed on increasing pro/linus completed w/ pt. Supplements provided. RD to follow hospital course.    Malnutrition Severity Assessment  Malnutrition Type: Chronic Disease - Related Malnutrition     Malnutrition Type (last 8 hours)      Malnutrition Severity Assessment     Row Name 05/15/19 1617       Malnutrition Severity Assessment    Malnutrition Type  Chronic Disease - Related Malnutrition    Row Name 05/15/19 1617       Insufficient Energy Intake     Insufficient Energy Intake   <50% of est. energy requirement for >or equal to 1 month    Row Name 05/15/19 1617       Unintentional Weight Loss     Unintentional Weight Loss   Weight loss of 10% in six months    Row Name 05/15/19 1617       Muscle Loss    Loss of Muscle Mass Findings  Severe    [place]  Moderate - slight depression    Clavicle Bone Region  Severe - protruding prominent bone    Acromion Bone Region  Severe - squared shoulders, bones, and acromion process protrusion prominent    Scapular Bone Region  Severe - prominent bones, depressions easily visible between ribs, scapula, spine, shoulders    Patellar Region  Moderate - patella more prominent, less muscle definition around patella    Anterior Thigh Region  Moderate - mild depression on inner thigh    Posterior Calf Region  Severe - thin with very little definition/firmness    Row Name 05/15/19 1617       Fat Loss    Subcutaneous Fat Loss Findings  Moderate    Orbital Region   Moderate -  somewhat hollowness, slightly dark circles    Upper Arm Region   Moderate - some fat tissue, not ample    Row Name 05/15/19 1617       Criteria Met (Must meet criteria for severity in at least 2 of these categories: M Wasting, Fat Loss, Fluid, Secondary Signs, Wt. Status, Intake)    Patient meets criteria for   Severe Malnutrition          Electronically signed by:  Chelly Paredes RD  05/15/19 4:30 PM

## 2019-05-15 NOTE — ED PROVIDER NOTES
"Subjective   78-year-old white male presents to the emergency department chief complaint of nausea and vomiting.  Patient has a history of lung cancer.  He relates he has been ill the last few days with decreased appetite, nausea and vomiting, and generalized weakness.  Patient relates he is dehydrated.  Patient denies fever sweats or chills.  He denies abdominal pain.  He denies hematemesis melena or rectal bleeding.  Patient states \"I don't hurt anywhere.\"            Review of Systems   Constitutional: Positive for appetite change and fatigue. Negative for chills, diaphoresis and fever.   Respiratory: Negative for shortness of breath.    Cardiovascular: Negative for chest pain.   Gastrointestinal: Positive for nausea and vomiting. Negative for abdominal pain and blood in stool.   Genitourinary: Negative for dysuria.   Musculoskeletal: Negative for back pain and neck pain.   Neurological: Positive for weakness. Negative for syncope and headaches.   All other systems reviewed and are negative.      Past Medical History:   Diagnosis Date   • Allergic rhinitis    • Allergic rhinitis due to pollen    • Anxiety state    • Atopic dermatitis    • Backache    • Chronic obstructive lung disease (CMS/HCC)    • Common cold    • Constipation    • Contact dermatitis    • Cough    • Diarrhea    • Drug therapy     Long-term drug therapy     • Dyspnea    • Exanthematous disorder    • Hypoxia    • Itch of skin    • Malaise and fatigue     Other malaise and fatigue     • Male erectile disorder    • Nocturia     finding     • Osteoarthritis    • Pain in joint, ankle and foot    • Rash     O/E - a rash    • Screening for malignant neoplasm of prostate    • Upper respiratory infection        No Known Allergies    Past Surgical History:   Procedure Laterality Date   • CATARACT EXTRACTION Left 12/09/2003    Cataract removal (1)   - Left eye   • INJECTION OF MEDICATION  09/09/2015    Kenalog (11) [facility]       History reviewed. No " pertinent family history.    Social History     Socioeconomic History   • Marital status:      Spouse name: Not on file   • Number of children: Not on file   • Years of education: Not on file   • Highest education level: Not on file   Tobacco Use   • Smoking status: Former Smoker   • Smokeless tobacco: Former User   Substance and Sexual Activity   • Alcohol use: No   • Drug use: No   • Sexual activity: Defer           Objective   Physical Exam   Constitutional: He is oriented to person, place, and time. No distress.   cachectic   HENT:   Head: Normocephalic and atraumatic.   Right Ear: External ear normal.   Left Ear: External ear normal.   Nose: Nose normal.   Oropharynx is dry   Eyes: Conjunctivae and EOM are normal. Pupils are equal, round, and reactive to light.   Neck: Normal range of motion. Neck supple.   Cardiovascular: Normal rate, regular rhythm, normal heart sounds and intact distal pulses.   Pulmonary/Chest: Effort normal and breath sounds normal.   Abdominal: Soft. Bowel sounds are normal. He exhibits no distension and no mass. There is no tenderness. There is no guarding.   Musculoskeletal: Normal range of motion. He exhibits no edema or tenderness.   Neurological: He is alert and oriented to person, place, and time. No cranial nerve deficit or sensory deficit. He exhibits normal muscle tone.   Skin: Skin is warm and dry. He is not diaphoretic.   Psychiatric: He has a normal mood and affect. His behavior is normal.   Nursing note and vitals reviewed.      ECG 12 Lead    Date/Time: 5/14/2019 8:00 PM  Performed by: Vlad Long MD  Authorized by: Vlad Long MD   Interpreted by physician  Clinical impression: abnormal ECG  Comments: Normal sinus rhythm with sinus arrhythmia rate of 85.  Baseline artifact.  No ST elevation.  Nonspecific findings.                 ED Course  ED Course as of May 14 2312   Tue May 14, 2019   2301 Patient is alert and resting comfortably in no acute distress.  I  reviewed the results of his evaluation with him and recommended admission to the hospital.  []   2311 Case discussed with the hospitalist and he agrees to admit to telemetry.  []      ED Course User Index  [] Vlad Long MD        Labs Reviewed   COMPREHENSIVE METABOLIC PANEL - Abnormal; Notable for the following components:       Result Value    Sodium 135 (*)     CO2 20.0 (*)     Calcium 8.3 (*)     Albumin 2.90 (*)     eGFR Non  Amer 55 (*)     All other components within normal limits    Narrative:     GFR Normal >60  Chronic Kidney Disease <60  Kidney Failure <15   LIPASE - Abnormal; Notable for the following components:    Lipase 80 (*)     All other components within normal limits   URINALYSIS W/ MICROSCOPIC IF INDICATED (NO CULTURE) - Abnormal; Notable for the following components:    Ketones, UA 40 mg/dL (2+) (*)     Bilirubin, UA Moderate (2+) (*)     All other components within normal limits    Narrative:     Urine microscopic not indicated.   TROPONIN (IN-HOUSE) - Abnormal; Notable for the following components:    Troponin T 0.072 (*)     All other components within normal limits    Narrative:     Troponin T Reference Range:  <= 0.03 ng/mL-   Negative for AMI  >0.03 ng/mL-     Abnormal for myocardial necrosis.  Clinicians would have to utilize clinical acumen, EKG, Troponin and serial changes to determine if it is an Acute Myocardial Infarction or myocardial injury due to an underlying chronic condition.    CBC WITH AUTO DIFFERENTIAL - Abnormal; Notable for the following components:    Hemoglobin 12.8 (*)     Monocyte % 15.9 (*)     Immature Grans % 1.7 (*)     Monocytes, Absolute 1.62 (*)     Immature Grans, Absolute 0.17 (*)     All other components within normal limits   CBC AND DIFFERENTIAL    Narrative:     The following orders were created for panel order CBC & Differential.  Procedure                               Abnormality         Status                     ---------                                -----------         ------                     CBC Auto Differential[[id number]]        Abnormal            Final result                 Please view results for these tests on the individual orders.     Ct Abdomen Pelvis With Contrast    Result Date: 5/14/2019  Narrative: Exam: CT abdomen  pelvis with contrast. INDICATION: Weakness, history of lung carcinoma TECHNIQUE: Routine CT abdomen pelvis with contrast. Sagittal coronal reconstructions were obtained. This exam was performed according to our departmental dose-optimization program, which includes automated exposure control, adjustment of the mA and/or kV according to patient size and/or use of iterative reconstruction technique. FINDINGS: Pulmonary emphysematous changes are present. Mild atelectasis is present in the posterior aspects of the lower lungs. The liver, spleen, pancreas and adrenal glands gallbladder and kidneys unremarkable. No urinary tract calculus or hydronephrosis. There is mild dilatation of infrarenal abdominal aorta measuring up to 2.8 cm. No significant adenopathy. No bowel obstruction or abnormal bowel wall thickening. Diverticula disease is present. No evidence of diverticulitis. Normal appendix. No abnormal stranding in the mesentery or ascites. Bladder is unremarkable. The degenerative changes present in the spine.     Impression: 1. No obvious acute abnormality 2. Diverticulosis 3. Dilatation of the infrarenal abdominal aorta measuring up to 2.8 cm. AAA Size: Follow-up Recommendation (1): 2.6 - 2.9 cm   Every 5 years (2) 3.0 - 3.4 cm   Every 3 years 3.5 - 3.9 cm   Every 12 months 4.0 - 4.4 cm   Every 12 months, vasc consult rec 4.5 - 5.4 cm   Every 6 months, vasc consult rec >=5.5 cm   Referral to vascular surgeon recommended ______________________________________________________ (1)Based upon the Society for Vascular Surgery Guidelines:  J Vasc Surg. 2009 Oct;50(4 Suppl):S2-49 (2)For aortas of max dung of 2.6-2.9 cm that  meet criteria for AAA (>= 1.5 x proximal normal segment) 4. Pulmonary emphysema Electronically signed by:  Duke Boateng MD  5/14/2019 10:50 PM CDT Workstation: [id number]    Xr Chest 1 View    Result Date: 5/14/2019  Narrative: EXAM:         Radiograph(s), Chest VIEWS:   Frontal  ; 1     DATE/TIME:  5/14/2019 8:37 PM CDT            INDICATION:   weakness, history of lung cancer  COMPARISON:  CXR: 10/19/18         FINDINGS:         - lines/tubes:    none   - cardiac:         size within normal limits       - mediastinum: contour within normal limits       - lungs:         no focal air space process, pulmonary interstitial edema, nodule(s)/mass           - pleura:         no evidence of  fluid                - osseous:         unremarkable for age                - misc.:        Impression: CONCLUSION:    1. No evidence of an active cardiopulmonary process.                                                  Electronically signed by:  MAXINE Gallegos MD  5/14/2019 8:38 PM CDT Workstation: [id number]            [facility]      Final diagnoses:   Elevated troponin   Non-intractable vomiting with nausea, unspecified vomiting type   Weakness   Abdominal aortic aneurysm (AAA) without rupture (CMS/HCC)            Vlad Long MD  05/14/19 3648       Vlad Long MD  05/14/19 5299

## 2019-05-15 NOTE — THERAPY EVALUATION
Acute Care - Occupational Therapy Initial Evaluation  [facility]     Patient Name: Fuad Brumfield  : 1940  MRN: [id number]  Today's Date: 5/15/2019  Onset of Illness/Injury or Date of Surgery: (P) 19  Date of Referral to OT: 05/15/19  Referring Physician: (P) Dr. Oliveira    Admit Date: 2019       ICD-10-CM ICD-9-CM   1. Elevated troponin R74.8 790.6   2. Non-intractable vomiting with nausea, unspecified vomiting type R11.2 787.01   3. Weakness R53.1 780.79   4. Abdominal aortic aneurysm (AAA) without rupture (CMS/HCC) I71.4 441.4   5. Impaired mobility and ADLs Z74.09 799.89     Patient Active Problem List   Diagnosis   • Impotence   • Chronic obstructive pulmonary disease (CMS/HCC)   • Pneumonia of right lower lobe due to infectious organism (CMS/HCC)   • Chronic back pain   • Hypoxia   • CAP (community acquired pneumonia)   • Respiratory failure with hypoxia (CMS/HCC)   • Elevated troponin     Past Medical History:   Diagnosis Date   • Allergic rhinitis    • Allergic rhinitis due to pollen    • Anxiety state    • Atopic dermatitis    • Backache    • Chronic obstructive lung disease (CMS/HCC)    • Common cold    • Constipation    • Contact dermatitis    • Cough    • Diarrhea    • Drug therapy     Long-term drug therapy     • Dyspnea    • Exanthematous disorder    • Hypoxia    • Itch of skin    • Malaise and fatigue     Other malaise and fatigue     • Male erectile disorder    • Nocturia     finding     • Osteoarthritis    • Pain in joint, ankle and foot    • Rash     O/E - a rash    • Screening for malignant neoplasm of prostate    • Upper respiratory infection      Past Surgical History:   Procedure Laterality Date   • CATARACT EXTRACTION Left 2003    Cataract removal (1)   - Left eye   • INJECTION OF MEDICATION  2015    Kenalog (11) - EDA Pierson          OT ASSESSMENT FLOWSHEET (last 12 hours)      Occupational Therapy Evaluation     Row Name 05/15/19 0826                    OT Evaluation Time/Intention    Subjective Information  complains of;fatigue;weakness  -        Document Type  evaluation  -        Mode of Treatment  individual therapy;occupational therapy  -        Total Evaluation Minutes, Occupational Therapy  44  -        Patient Effort  good  -           General Information    Patient Profile Reviewed?  yes  -        Onset of Illness/Injury or Date of Surgery  05/14/19  -        Referring Physician  Dr Oliveira   -        Patient Observations  agree to therapy;lethargic  -        Patient/Family Observations  wife present   -        General Observations of Patient  pt sidelying to the right on O2 1.5L, bed alarm, tele, IV,   -        Prior Level of Function  independent:;all household mobility;transfer;bed mobility;ADL's wife or hired help for IADL; wife or friends for driving   -        Equipment Currently Used at Home  cane, quad;oxygen;raised toilet;shower chair  -        Existing Precautions/Restrictions  fall;oxygen therapy device and L/min  -        Limitations/Impairments  safety/cognitive  -        Risks Reviewed  patient:;spouse/S.O.:;LOB;dizziness;increased discomfort;change in vital signs  -        Benefits Reviewed  patient:;spouse/S.O.:;improve function;increase independence;increase strength;increase balance;increase knowledge  -        Barriers to Rehab  hearing deficit  -           Relationship/Environment    Lives With  spouse  -           Resource/Environmental Concerns    Current Living Arrangements  home/apartment/condo  -           Cognitive Assessment/Interventions    Additional Documentation  Cognitive Assessment/Intervention (Group)  [facility]           Cognitive Assessment/Intervention- PT/OT    Affect/Mental Status (Cognitive)  confused;low arousal/lethargic  -        Orientation Status (Cognition)  oriented to;person;place;situation stated 2007  [facility]        Follows Commands (Cognition)  follows one step  commands;over 90% accuracy;verbal cues/prompting required;repetition of directions required  -        Safety Deficit (Cognitive)  mild deficit  -        Personal Safety Interventions  fall prevention program maintained;gait belt;nonskid shoes/slippers when out of bed;supervised activity  -           Safety Issues, Functional Mobility    Safety Issues Affecting Function (Mobility)  awareness of need for assistance;insight into deficits/self awareness;judgment;positioning of assistive device;problem solving;safety precaution awareness;safety precautions follow-through/compliance;sequencing abilities  -        Impairments Affecting Function (Mobility)  balance;coordination;endurance/activity tolerance;motor control;motor planning;postural/trunk control;strength;shortness of breath  -           Bed Mobility Assessment/Treatment    Bed Mobility Assessment/Treatment  supine-sit;sit-supine  -        Supine-Sit [place] (Bed Mobility)  contact guard;verbal cues;nonverbal cues (demo/gesture)  -        Sit-Supine [place] (Bed Mobility)  supervision;verbal cues;nonverbal cues (demo/gesture)  -        Bed Mobility, Safety Issues  impaired trunk control for bed mobility;decreased use of legs for bridging/pushing;decreased use of arms for pushing/pulling  -           Functional Mobility    Functional Mobility- Ind. Level  contact guard assist;verbal cues required;nonverbal cues required (demo/gesture)  -        Functional Mobility- Device  rolling walker  -        Functional Mobility- Comment  unsteady and unstable; encouraged pt to use RW due to weakness  -           Transfer Assessment/Treatment    Transfer Assessment/Treatment  sit-stand transfer;stand-sit transfer;toilet transfer  -           Sit-Stand Transfer    Sit-Stand [place] (Transfers)  minimum assist (75% patient effort);contact guard;nonverbal cues (demo/gesture);verbal cues  -        Assistive Device (Sit-Stand Transfers)   walker, front-wheeled  -           Stand-Sit Transfer    Stand-Sit [place] (Transfers)  contact guard  -        Assistive Device (Stand-Sit Transfers)  walker, front-wheeled  [facility]           Toilet Transfer    Type (Toilet Transfer)  stand-sit;sit-stand  -        [place] Level (Toilet Transfer)  moderate assist (50% patient effort);nonverbal cues (demo/gesture);verbal cues  -        Assistive Device (Toilet Transfer)  grab bars/safety frame;walker, front-wheeled  [facility]           ADL Assessment/Intervention    BADL Assessment/Intervention  lower body dressing  -           Lower Body Dressing Assessment/Training    Comment (Lower Body Dressing)  pt doff socks CGA sitting EOB; pt don left sock CGA, min assist to don right with sore on foot   -           BADL Safety/Performance    Impairments, BADL Safety/Performance  balance;endurance/activity tolerance;coordination;motor control;motor planning;shortness of breath;strength;trunk/postural control  -           General ROM    GENERAL ROM COMMENTS  RUE WFL, fair+ digit to thumb; LUE WFL fair+ digit to thumb   -           MMT (Manual Muscle Testing)    General MMT Comments  RUE  grossly 4-/5; RUE grossly 4-/5 ; LUE  groslsy 4- to 4/5; LUE grossly 4-/5   -           Sensory Assessment/Intervention    Additional Documentation  Hearing Assessment (Group);Vision Assessment/Intervention (Group)  -           Light Touch Sensation Assessment    Left Upper Extremity: Light Touch Sensation Assessment  intact  -        Right Upper Extremity: Light Touch Sensation Assessment  intact  -           Hearing Assessment    Hearing Status  hearing impairment, bilaterally  -           Vision Assessment/Intervention    Visual Impairment/Limitations  other (see comments) reports has glasses wears part time   -           Positioning and Restraints    Pre-Treatment Position  in bed  -        Post Treatment Position  bed  -        In Bed  supine;call  light within reach;encouraged to call for assist;exit alarm on;with family/caregiver;side rails up x2  -BH           Pain Assessment    Additional Documentation  Pain Scale: Numbers Pre/Post-Treatment (Group)  -           Pain Scale: Numbers Pre/Post-Treatment    Pain Scale: Numbers, Pretreatment  0/10 - no pain  -        Pain Scale: Numbers, Post-Treatment  0/10 - no pain  -           Wound 05/15/19 0045 Right foot    Wound - Properties Group Date first assessed: 05/15/19  -TD Time first assessed: 0045  -TD Present On Admission : yes;picture taken  -TD Side: Right  -TD Location: foot  -TD       Plan of Care Review    Plan of Care Reviewed With  patient;spouse  -           Clinical Impression (OT)    Date of Referral to OT  05/15/19  -        OT Diagnosis  Impaired mobility and ADL   -        Prognosis (OT Eval)  fair  -        Functional Level at Time of Evaluation (OT Eval)  pt decreased endurance, strength, balance, safety and independence with ADL  -        Patient/Family Goals Statement (OT Eval)  to go home, to sleep   -        Criteria for Skilled Therapeutic Interventions Met (OT Eval)  yes;treatment indicated  -        Rehab Potential (OT Eval)  fair, will monitor progress closely  -        Therapy Frequency (OT Eval)  other (see comments) 5-7 days a week   -        Predicted Duration of Therapy Intervention (Therapy Eval)  until d/c   -        Care Plan Review (OT)  evaluation/treatment results reviewed;care plan/treatment goals reviewed;risks/benefits reviewed;patient/other agree to care plan  -        Care Plan Review, Other Participant (OT Eval)  spouse  -        Anticipated Discharge Disposition (OT)  home with 24/7 care;home with home health  -           Vital Signs    Pre Systolic BP Rehab  122  -BH        Pre Treatment Diastolic BP  72  -BH        Post Systolic BP Rehab  128  -BH        Post Treatment Diastolic BP  58  -BH        Pretreatment Heart Rate (beats/min)   84  -BH        Intratreatment Heart Rate (beats/min)  92  -BH        Posttreatment Heart Rate (beats/min)  87  -BH        Pre SpO2 (%)  95  -BH        O2 Delivery Pre Treatment  supplemental O2  -BH        Intra SpO2 (%)  88 deepali to mid 90s   -BH        O2 Delivery Intra Treatment  supplemental O2  -BH        Post SpO2 (%)  93  -BH        O2 Delivery Post Treatment  supplemental O2  -BH        Pre Patient Position  Supine  -BH        Intra Patient Position  Sitting  -BH        Post Patient Position  Supine  -BH           Planned OT Interventions    Planned Therapy Interventions (OT Eval)  activity tolerance training;adaptive equipment training;BADL retraining;functional balance retraining;IADL retraining;neuromuscular control/coordination retraining;occupation/activity based interventions;passive ROM/stretching;patient/caregiver education/training;ROM/therapeutic exercise;strengthening exercise;transfer/mobility retraining  -           OT Goals    Transfer Goal Selection (OT)  transfer, OT goal 1  -        Bathing Goal Selection (OT)  bathing, OT goal 1  -        Dressing Goal Selection (OT)  dressing, OT goal 1  -        Toileting Goal Selection (OT)  toileting, OT goal 1  -        Functional Mobility Goal Selection (OT)  functional mobility, OT goal 1  -        Additional Documentation  Functional Mobility Selection (OT) (Row)  -           Transfer Goal 1 (OT)    Activity/Assistive Device (Transfer Goal 1, OT)  toilet  -        [place] Level/Cues Needed (Transfer Goal 1, OT)  conditional independence  -        Time Frame (Transfer Goal 1, OT)  long term goal (LTG);by discharge  -        Progress/Outcome (Transfer Goal 1, OT)  goal not met  -           Bathing Goal 1 (OT)    Activity/Assistive Device (Bathing Goal 1, OT)  bathing skills, all  -        [place] Level/Cues Needed (Bathing Goal 1, OT)  set-up required;supervision required  -        Time Frame (Bathing Goal 1, OT)   long term goal (LTG);by discharge  -        Progress/Outcomes (Bathing Goal 1, OT)  goal not met  -           Dressing Goal 1 (OT)    Activity/Assistive Device (Dressing Goal 1, OT)  dressing skills, all  -        [place]/Cues Needed (Dressing Goal 1, OT)  set-up required;supervision required  -        Time Frame (Dressing Goal 1, OT)  long term goal (LTG);by discharge  -        Progress/Outcome (Dressing Goal 1, OT)  goal not met  -           Toileting Goal 1 (OT)    Activity/Device (Toileting Goal 1, OT)  toileting skills, all  -        [place] Level/Cues Needed (Toileting Goal 1, OT)  conditional independence  -        Time Frame (Toileting Goal 1, OT)  long term goal (LTG);by discharge  -        Progress/Outcome (Toileting Goal 1, OT)  goal not met  -           Functional Mobility Goal 1 (OT)    Activity/Assistive Device (Functional Mobility Goal 1, OT)  walker, rolling  -        [place] Level/Cues Needed (Functional Mobility Goal 1, OT)  supervision required  -        Distance Goal 1 (Functional Mobility, OT)  for ADL and functional tasks  -        Time Frame (Functional Mobility Goal 1, OT)  long term goal (LTG);by discharge  -        Progress/Outcome (Functional Mobility Goal 1, OT)  goal not met  -           Patient Education Goal (OT)    Activity (Patient Education Goal, OT)  Pt and/or pt's spouse will communicate good home safety awareness.   -        [place]/Cues/Accuracy (Memory Goal 2, OT)  verbalizes understanding  -        Time Frame (Patient Education Goal, OT)  long term goal (LTG);by discharge  -        Progress/Outcome (Patient Education Goal, OT)  goal not met  -           Living Environment    Home Accessibility  wheelchair accessible small ramp 1 step up; walk in shower w/lip  -          User Key  (r) = Recorded By, (t) = Taken By, (c) = Cosigned By    Initials Name Effective Dates     Romy Mo, OTR/L 06/08/18 -     TD  Iraida Ozuna RNA 06/04/18 -          Occupational Therapy Education     Title: PT OT SLP Therapies (In Progress)     Topic: Occupational Therapy (In Progress)     Point: ADL training (Done)     Description: Instruct learner(s) on proper safety adaptation and remediation techniques during self care or transfers.   Instruct in proper use of assistive devices.    Learning Progress Summary           Patient Acceptance, E, VU,NR by  at 5/15/2019 11:48 AM    Comment:  Educated about OT and POC. Educated to call for assist. Educated on need for RW. Educated on safety throughout.   Significant Other Acceptance, E, VU,NR by  at 5/15/2019 11:48 AM    Comment:  Educated about OT and POC. Educated to call for assist. Educated on need for RW. Educated on safety throughout.                   Point: Precautions (Done)     Description: Instruct learner(s) on prescribed precautions during self-care and functional transfers.    Learning Progress Summary           Patient Acceptance, E, VU,NR by  at 5/15/2019 11:48 AM    Comment:  Educated about OT and POC. Educated to call for assist. Educated on need for RW. Educated on safety throughout.   Significant Other Acceptance, E, VU,NR by  at 5/15/2019 11:48 AM    Comment:  Educated about OT and POC. Educated to call for assist. Educated on need for RW. Educated on safety throughout.                               User Key     Initials Effective Dates Name Provider Type Discipline     06/08/18 -  Romy Mo, OTR/L Occupational Therapist OT                  OT Recommendation and Plan  Outcome Summary/Treatment Plan (OT)  Anticipated Discharge Disposition (OT): home with 24/7 care, home with home health  Planned Therapy Interventions (OT Eval): activity tolerance training, adaptive equipment training, BADL retraining, functional balance retraining, IADL retraining, neuromuscular control/coordination retraining, occupation/activity based interventions, passive  ROM/stretching, patient/caregiver education/training, ROM/therapeutic exercise, strengthening exercise, transfer/mobility retraining  Therapy Frequency (OT Eval): other (see comments)(5-7 days a week )  Plan of Care Review  Plan of Care Reviewed With: patient, spouse  Plan of Care Reviewed With: patient, spouse  Outcome Summary: OT eval completed this date. Pt sleepy but would awake to engage with redirection. Pt was CGA for sup to sit to sup. Pt was CGA to min assist for sit to stand off bed, mod assist for toilet t/f (BSC placed over toilet to assist pt), Pt min assist to don socks. Pt could benefit from further skilled OT to increase strength, balance, endurance, safety and independence with ADL. Recommend d/c to home with 24/7 assist and home health therapy with use of a walker and shower chair.     Outcome Measures     Row Name 05/15/19 0826             How much help from another is currently needed...    Putting on and taking off regular lower body clothing?  2  -BH      Bathing (including washing, rinsing, and drying)  2  -BH      Toileting (which includes using toilet bed pan or urinal)  3  -BH      Putting on and taking off regular upper body clothing  3  -BH      Taking care of personal grooming (such as brushing teeth)  3  -BH      Eating meals  4  -      Score  17  -         Functional Assessment    Outcome Measure Options  AM-PAC 6 Clicks Daily Activity (OT)  -        User Key  (r) = Recorded By, (t) = Taken By, (c) = Cosigned By    Initials Name Provider Type     Romy Mo, OTR/L Occupational Therapist          Time Calculation:   Time Calculation- OT     Row Name 05/15/19 1151             Time Calculation- OT    OT Start Time  0826  -      OT Stop Time  0910  -      OT Time Calculation (min)  44 min  -      OT Received On  05/15/19  -      OT Goal Re-Cert Due Date  05/28/19  -        User Key  (r) = Recorded By, (t) = Taken By, (c) = Cosigned By    Initials Name Provider Type      Romy Mo, OTR/L Occupational Therapist        Therapy Charges for Today     Code Description Service Date Service Provider Modifiers Qty    [id number] HC OT EVAL MOD COMPLEXITY 3 5/15/2019 Romy Mo, ROXANNER/L GO 1               CHERELLE Lennon/OTTO  5/15/2019

## 2019-05-15 NOTE — THERAPY EVALUATION
Acute Care - Physical Therapy Initial Evaluation  [facility]     Patient Name: Fuad Brumfield  : 1940  MRN: [id number]  Today's Date: 5/15/2019   Onset of Illness/Injury or Date of Surgery: 19  Date of Referral to PT: 05/15/19  Referring Physician: Dr. Oliveira      Admit Date: 2019    Visit Dx:     ICD-10-CM ICD-9-CM   1. Elevated troponin R74.8 790.6   2. Non-intractable vomiting with nausea, unspecified vomiting type R11.2 787.01   3. Weakness R53.1 780.79   4. Abdominal aortic aneurysm (AAA) without rupture (CMS/HCC) I71.4 441.4   5. Impaired mobility and ADLs Z74.09 799.89   6. Impaired functional mobility, balance, gait, and endurance Z74.09 V49.89     Patient Active Problem List   Diagnosis   • Impotence   • Chronic obstructive pulmonary disease (CMS/HCC)   • Pneumonia of right lower lobe due to infectious organism (CMS/HCC)   • Chronic back pain   • Hypoxia   • CAP (community acquired pneumonia)   • Respiratory failure with hypoxia (CMS/HCC)   • Elevated troponin     Past Medical History:   Diagnosis Date   • Allergic rhinitis    • Allergic rhinitis due to pollen    • Anxiety state    • Atopic dermatitis    • Backache    • Chronic obstructive lung disease (CMS/HCC)    • Common cold    • Constipation    • Contact dermatitis    • Cough    • Diarrhea    • Drug therapy     Long-term drug therapy     • Dyspnea    • Exanthematous disorder    • Hypoxia    • Itch of skin    • Malaise and fatigue     Other malaise and fatigue     • Male erectile disorder    • Nocturia     finding     • Osteoarthritis    • Pain in joint, ankle and foot    • Rash     O/E - a rash    • Screening for malignant neoplasm of prostate    • Upper respiratory infection      Past Surgical History:   Procedure Laterality Date   • CATARACT EXTRACTION Left 2003    Cataract removal (1)   - Left eye   • INJECTION OF MEDICATION  2015    Kenalog (11) [facility]        PT ASSESSMENT (last 12 hours)       Physical Therapy Evaluation     Row Name 05/15/19 1030          PT Evaluation Time/Intention    Subjective Information  no complaints  -     Document Type  evaluation  -LORENA     Mode of Treatment  individual therapy;physical therapy  -LORENA     Patient Effort  good  -LORENA     Symptoms Noted During/After Treatment  significant change in vital signs;shortness of breath  -     Row Name 05/15/19 1030          General Information    Patient Profile Reviewed?  yes  -LORENA     Onset of Illness/Injury or Date of Surgery  05/14/19  -     Referring Physician  Dr. Oliveira  -     Patient Observations  alert;cooperative;agree to therapy  -LORENA     Patient/Family Observations  wife present  -LORENA     General Observations of Patient  Pt supine;noted O2 2.5lpm per nasal cannula, bed alarm IV  -LORENA     Prior Level of Function  independent:;all household mobility;transfer;bed mobility;ADL's wife or friend for driving  -LORENA     Equipment Currently Used at Home  cane, quad;oxygen;raised toilet;shower chair  -LORENA     Pertinent History of Current Functional Problem  Pt admitted to [facility] with elevated troponins, weakness and h/o lung CA  -LORENA     Existing Precautions/Restrictions  fall;oxygen therapy device and L/min  -LORENA     Risks Reviewed  patient:;spouse/S.O.:;LOB;nausea/vomiting;dizziness;increased discomfort;change in vital signs  -     Benefits Reviewed  patient:;spouse/S.O.:;improve function;increase independence;increase strength;increase balance;decrease pain;decrease risk of DVT;improve skin integrity;increase knowledge  -     Row Name 05/15/19 1030          Relationship/Environment    Lives With  spouse  -[facility] Name 05/15/19 1030          Resource/Environmental Concerns    Current Living Arrangements  home/apartment/condo  -     Row Name 05/15/19 1030          Cognitive Assessment/Interventions    Additional Documentation  Cognitive Assessment/Intervention (Group)  -     Row Name 05/15/19 1030          Cognitive  Assessment/Intervention- PT/OT    Orientation Status (Cognition)  oriented x 4  -     Personal Safety Interventions  fall prevention program maintained;gait belt;muscle strengthening facilitated;supervised activity  -     Row Name 05/15/19 1030          Safety Issues, Functional Mobility    Safety Issues Affecting Function (Mobility)  awareness of need for assistance;insight into deficits/self awareness;safety precaution awareness;safety precautions follow-through/compliance  -     Impairments Affecting Function (Mobility)  balance;endurance/activity tolerance;strength;shortness of breath  -     Row Name 05/15/19 1030          Bed Mobility Assessment/Treatment    Bed Mobility Assessment/Treatment  supine-sit;sit-supine  -     Supine-Sit [place] (Bed Mobility)  contact guard  -     Sit-Supine [place] (Bed Mobility)  supervision  -     Assistive Device (Bed Mobility)  head of bed elevated;bed rails  -     Row Name 05/15/19 1030          Transfer Assessment/Treatment    Transfer Assessment/Treatment  sit-stand transfer;stand-sit transfer  -     Sit-Stand [place] (Transfers)  contact guard  -     Stand-Sit [place] (Transfers)  contact guard  -     [place] Level (Toilet Transfer)  contact guard;verbal cues  -     Assistive Device (Toilet Transfer)  grab bars/safety frame  -     Row Name 05/15/19 1030          Sit-Stand Transfer    Assistive Device (Sit-Stand Transfers)  other (see comments) none  -     Row Name 05/15/19 1030          Toilet Transfer    Type (Toilet Transfer)  sit-stand;stand-sit  -     Row Name 05/15/19 1030          Gait/Stairs Assessment/Training    Gait/Stairs Assessment/Training  gait/ambulation independence  -     [place] Level (Gait)  contact guard  -     Assistive Device (Gait)  other (see comments) none  -     Distance in Feet (Gait)  5, 20  -LORENA     Comment (Gait/Stairs)  O2 sats dropped to 86% with ambulation and recovered to 92%  in 45s. Pt reports he has lightheadedness at times(he calls bubble headedness)  -     Row Name 05/15/19 1030          General ROM    GENERAL ROM COMMENTS   AROM WFL all extremities  -     Row Name 05/15/19 1030          MMT (Manual Muscle Testing)    General MMT Comments  Please refer to OT evaluation for BUE strength detail;BLE: 4-/5 ankles/feet, knees, hips  -     Row Name 05/15/19 1030          Sensory Assessment/Intervention    Sensory General Assessment  no sensation deficits identified to light touch  -     Row Name 05/15/19 1030          Hearing Assessment    Hearing Status  hearing impairment, bilaterally  -     Row Name 05/15/19 1030          Vision Assessment/Intervention    Visual Impairment/Limitations  corrective lenses full time  -     Row Name 05/15/19 1030          Pain Assessment    Additional Documentation  Pain Scale: Numbers Pre/Post-Treatment (Group)  -LORENA     Row Name 05/15/19 1030          Pain Scale: Numbers Pre/Post-Treatment    Pain Scale: Numbers, Pretreatment  0/10 - no pain  -     Pain Scale: Numbers, Post-Treatment  0/10 - no pain  -LORENA     Row Name             Wound 05/15/19 0045 Right foot    Wound - Properties Group Date first assessed: 05/15/19  -TD Time first assessed: 0045  -TD Present On Admission : yes;picture taken  -TD Side: Right  -TD Location: foot  -TD    Row Name 05/15/19 1030          Plan of Care Review    Plan of Care Reviewed With  patient;spouse  -[facility] Name 05/15/19 1030          Physical Therapy Clinical Impression    Date of Referral to PT  05/15/19  -     PT Diagnosis (PT Clinical Impression)  impaired gait and mobility  -     Prognosis (PT Clinical Impression)  good  -LORENA     Patient/Family Goals Statement (PT Clinical Impression)  return to PLOF  -     Criteria for Skilled Interventions Met (PT Clinical Impression)  yes;treatment indicated  -     Pathology/Pathophysiology Noted (Describe Specifically for Each System)   musculoskeletal;pulmonary  -     Impairments Found (describe specific impairments)  aerobic capacity/endurance;gait, locomotion, and balance;ventilation and respiration/gas exchange  -     Functional Limitations in Following Categories (Describe Specific Limitations)  home management;community/leisure  -     Disability: Inability to Perform Actions/Activities of Required Roles (describe specific disability)  community/leisure  -LORENA     Rehab Potential (PT Clinical Summary)  good, to achieve stated therapy goals  -     Predicted Duration of Therapy (PT)  until discharge or goals met  -     Care Plan Review (PT)  evaluation/treatment results reviewed;care plan/treatment goals reviewed;risks/benefits reviewed;current/potential barriers reviewed;patient/other agree to care plan  -     Care Plan Review, Other Participant (PT Clinical Impression)  spouse  -     Row Name 05/15/19 1030          Vital Signs    Pre Systolic BP Rehab  112  -LORENA     Pre Treatment Diastolic BP  50  -LORENA     Post Systolic BP Rehab  118  -LORENA     Post Treatment Diastolic BP  56  -LORENA     Pretreatment Heart Rate (beats/min)  109  -LORENA     Intratreatment Heart Rate (beats/min)  111  -LORENA     Posttreatment Heart Rate (beats/min)  94  -LORENA     Pre SpO2 (%)  92  -LORENA     O2 Delivery Pre Treatment  supplemental O2  -LORENA     Intra SpO2 (%)  86  -LORENA     O2 Delivery Intra Treatment  supplemental O2  -LORENA     Post SpO2 (%)  92  -LORENA     O2 Delivery Post Treatment  supplemental O2  -LORENA     Pre Patient Position  Supine  -LORENA     Intra Patient Position  Standing  -LORENA     Post Patient Position  Supine  -LORENA     Recovery Time  45s  -     Row Name 05/15/19 1030          Physical Therapy Goals    Transfer Goal Selection (PT)  transfer, PT goal 1  -LORENA     Gait Training Goal Selection (PT)  gait training, PT goal 1  -LORENA     Balance Goal Selection (PT)  balance, PT goal (free text)  -LORENA     Stairs Goal Selection (PT)  stairs, PT goal 1  -LORENA     Additional  Documentation  Stairs Goal Selection (PT) (Row);Balance Goal Selection (PT) (Row)  -     Row Name 05/15/19 1030          Transfer Goal 1 (PT)    Activity/Assistive Device (Transfer Goal 1, PT)  sit-to-stand/stand-to-sit;bed-to-chair/chair-to-bed  -LORENA     [place] Level/Cues Needed (Transfer Goal 1, PT)  independent;conditional independence  -LORENA     Time Frame (Transfer Goal 1, PT)  by discharge  -LORENA     Progress/Outcome (Transfer Goal 1, PT)  goal not met  -LORENA     Row Name 05/15/19 1030          Gait Training Goal 1 (PT)    Activity/Assistive Device (Gait Training Goal 1, PT)  gait (walking locomotion)  -LORENA     [place] Level (Gait Training Goal 1, PT)  conditional independence;independent O2 sats will not drop below 92%  -LORENA     Distance (Gait Goal 1, PT)  50ftx3  -LORENA     Time Frame (Gait Training Goal 1, PT)  by discharge  -LORENA     Progress/Outcome (Gait Training Goal 1, PT)  goal not met  -LORENA     Row Name 05/15/19 1030          Balance Goal (PT)    Balance Goal (PT)  Pt will score at least 20 of 24 on DGI  -LORENA     Time Frame (Balance Goal, PT)  by discharge  -LORENA     Progress/Outcome (Balance Goal, PT)  goal not met  -LORENA     Row Name 05/15/19 1030          Stairs Goal 1 (PT)    Activity/Assistive Device (Stairs Goal 1, PT)  ascending stairs;descending stairs  -LORENA     [place] Level/Cues Needed (Stairs Goal 1, PT)  conditional independence  -LORENA     Number of Stairs (Stairs Goal 1, PT)  1  -LORENA     Time Frame (Stairs Goal 1, PT)  by discharge  -LORENA     Progress/Outcome (Stairs Goal 1, PT)  goal not met  -LORENA     Row Name 05/15/19 1030          Positioning and Restraints    Pre-Treatment Position  in bed  -LORENA     Post Treatment Position  bed  -LORENA     In Bed  call light within reach;encouraged to call for assist;exit alarm on;with family/caregiver;side rails up x2  -LORENA     Row Name 05/15/19 1030          Living Environment    Home Accessibility  wheelchair accessible small step up to ramp  -LORENA       User Key   (r) = Recorded By, (t) = Taken By, (c) = Cosigned By    Initials Name Provider Type    Vikki Quigley, PT Physical Therapist    Iraida Ferguson RNA Registered Nurse        Physical Therapy Education     Title: PT OT SLP Therapies (In Progress)     Topic: Physical Therapy (In Progress)     Point: Mobility training (In Progress)     Learning Progress Summary           Patient Acceptance, E, NR by  at 5/15/2019  1:00 PM   Significant Other Acceptance, E, NR by  at 5/15/2019  1:00 PM                   Point: Body mechanics (In Progress)     Learning Progress Summary           Patient Acceptance, E, NR by  at 5/15/2019  1:00 PM   Significant Other Acceptance, E, NR by  at 5/15/2019  1:00 PM                   Point: Precautions (In Progress)     Learning Progress Summary           Patient Acceptance, E, NR by  at 5/15/2019  1:00 PM   Significant Other Acceptance, E, NR by  at 5/15/2019  1:00 PM                               User Key     Initials Effective Dates Name Provider Type Discipline    LORENA 04/03/18 -  Vikki Lentz PT Physical Therapist PT              PT Recommendation and Plan  Anticipated Discharge Disposition (PT): home with assist, home with home health  Planned Therapy Interventions (PT Eval): balance training, bed mobility training, gait training, home exercise program, patient/family education, stair training, strengthening, transfer training  Therapy Frequency (PT Clinical Impression): other (see comments)(6xwk)  Outcome Summary/Treatment Plan (PT)  Anticipated Discharge Disposition (PT): home with assist, home with home health  Plan of Care Reviewed With: patient, spouse  Outcome Summary: PT evaluation completed.Pt transferred with CGA and ambulated 5ft, 20ft with CGA. O2 sats dropped to 86% on 1.5 lpm with ambulation with recovery to 92% in 45seconds. Pt complained of lightheadedness that resolved once O2 sats improved. Function limited by decreased strength, balance, and tolerance  for functional mobility and activities. Pt will benefit from PT to regain lost function as pt improves medically. If patient discharged home prior to goals being met, Pt will benefit from 24/7 assist with HH therapy.  Outcome Measures     Row Name 05/15/19 1030 05/15/19 0826          How much help from another person do you currently need...    Turning from your back to your side while in flat bed without using bedrails?  4  -LORENA  --     Moving from lying on back to sitting on the side of a flat bed without bedrails?  3  -LORENA  --     Moving to and from a bed to a chair (including a wheelchair)?  3  -LORENA  --     Standing up from a chair using your arms (e.g., wheelchair, bedside chair)?  3  -LORENA  --     Climbing 3-5 steps with a railing?  3  -LORENA  --     To walk in hospital room?  3  -LORENA  --     AM-[facility] 6 Clicks Score  19  -LORENA  --        How much help from another is currently needed...    Putting on and taking off regular lower body clothing?  --  2  -BH     Bathing (including washing, rinsing, and drying)  --  2  -BH     Toileting (which includes using toilet bed pan or urinal)  --  3  -BH     Putting on and taking off regular upper body clothing  --  3  -BH     Taking care of personal grooming (such as brushing teeth)  --  3  -BH     Eating meals  --  4  -     Score  --  17  -        Functional Assessment    Outcome Measure Options  AM-PAC 6 Clicks Basic Mobility (PT)  -[facility] 6 Clicks Daily Activity (OT)  -       User Key  (r) = Recorded By, (t) = Taken By, (c) = Cosigned By    Initials Name Provider Type     Romy Mo, OTR/L Occupational Therapist    Vikki Quigley, PT Physical Therapist         Time Calculation:   PT Charges     Row Name 05/15/19 1305             Time Calculation    Start Time  1030  -      Stop Time  1118  -      Time Calculation (min)  48 min  -      PT Received On  05/15/19  -      PT Goal Re-Cert Due Date  05/28/19  -         Time Calculation- PT    Total Timed Code  Minutes- PT  25 minute(s)  -LORENA        User Key  (r) = Recorded By, (t) = Taken By, (c) = Cosigned By    Initials Name Provider Type    Vikki Quigley, PT Physical Therapist        Therapy Charges for Today     Code Description Service Date Service Provider Modifiers Qty    49765656643 HC PT EVAL MOD COMPLEXITY 1 5/15/2019 Vikki Lentz, PT GP 1    34220781491 HC GAIT TRAINING EA 15 MIN 5/15/2019 Vikki Lentz, PT GP 1    [id number]  PT THERAPEUTIC ACT EA 15 MIN 5/15/2019 Vikki Lentz, PT GP 1          PT G-Codes  Outcome Measure Options: AM-PAC 6 Clicks Basic Mobility (PT)  AM-PAC 6 Clicks Score: 19  Score: 17      Vikki Lentz PT  5/15/2019

## 2019-05-15 NOTE — PLAN OF CARE
Problem: Patient Care Overview  Goal: Plan of Care Review  Outcome: Ongoing (interventions implemented as appropriate)   05/15/19 4010   Coping/Psychosocial   Plan of Care Reviewed With patient;spouse   OTHER   Outcome Summary OT chari completed this date. Pt sleepy but would awake to engage with redirection. Pt was CGA for sup to sit to sup. Pt was CGA to min assist for sit to stand off bed, mod assist for toilet t/f (BSC placed over toilet to assist pt), Pt min assist to don socks. Pt could benefit from further skilled OT to increase strength, balance, endurance, safety and independence with ADL. Recommend d/c to home with 24/7 assist and home health therapy with use of a walker and shower chair.

## 2019-05-15 NOTE — PLAN OF CARE
Problem: Patient Care Overview  Goal: Plan of Care Review  Outcome: Ongoing (interventions implemented as appropriate)   05/15/19 6752   Coping/Psychosocial   Plan of Care Reviewed With patient   Plan of Care Review   Progress improving       Problem: Cardiac: ACS (Acute Coronary Syndrome) (Adult)  Goal: Signs and Symptoms of Listed Potential Problems Will be Absent, Minimized or Managed (Cardiac: ACS)  Outcome: Ongoing (interventions implemented as appropriate)      Problem: Fall Risk (Adult)  Goal: Identify Related Risk Factors and Signs and Symptoms  Outcome: Outcome(s) achieved Date Met: 05/15/19    Goal: Absence of Fall  Outcome: Ongoing (interventions implemented as appropriate)      Problem: Skin Injury Risk (Adult)  Goal: Identify Related Risk Factors and Signs and Symptoms  Outcome: Outcome(s) achieved Date Met: 05/15/19    Goal: Skin Health and Integrity  Outcome: Ongoing (interventions implemented as appropriate)      Problem: Hospitalized Acutely Ill Older (Adult)  Goal: Signs and Symptoms of Listed Potential Problems Will be Absent, Minimized or Managed (Hospitalized Acutely Ill Older)  Outcome: Ongoing (interventions implemented as appropriate)

## 2019-05-22 NOTE — PROGRESS NOTES
" Subjective   Fuad Brumfield is a 78 y.o. male.     History of Present Illness     Hospital follow up.   Copd/elevated troponin/fatigue.  Gentleman with him wants me to give mr brumfield prednisone to take daily.  Mr brumfield wants something to make him want to eat.   He has megace listed as discharge medicine, he doesn't think he is taking this.   He wants a steroid shot.    Review of Systems   Constitutional: Positive for fatigue. Negative for chills and fever.   HENT: Negative for congestion, ear discharge, ear pain, facial swelling, hearing loss, postnasal drip, rhinorrhea, sinus pressure, sore throat, trouble swallowing and voice change.    Eyes: Negative for discharge, redness and visual disturbance.   Respiratory: Positive for shortness of breath. Negative for cough, chest tightness and wheezing.    Cardiovascular: Negative for chest pain and palpitations.   Gastrointestinal: Negative for abdominal pain, blood in stool, constipation, diarrhea, nausea and vomiting.   Endocrine: Negative for polydipsia and polyuria.   Genitourinary: Negative for dysuria, flank pain, hematuria and urgency.   Musculoskeletal: Negative for arthralgias, back pain, joint swelling and myalgias.   Skin: Negative for rash.   Neurological: Negative for dizziness, weakness, numbness and headaches.   Hematological: Negative for adenopathy.   Psychiatric/Behavioral: Negative for confusion and sleep disturbance. The patient is not nervous/anxious.            /60 (BP Location: Left arm, Patient Position: Sitting, Cuff Size: Adult)   Pulse 104   Temp 98.8 °F (37.1 °C) (Temporal)   Ht 180.3 cm (70.98\")   Wt 55.9 kg (123 lb 3.2 oz)   SpO2 (!) 85%   BMI 17.19 kg/m²       Objective     Physical Exam   Constitutional: He is oriented to person, place, and time. He appears well-developed and well-nourished.   HENT:   Head: Normocephalic and atraumatic.   Right Ear: External ear normal.   Left Ear: External ear normal.   Nose: Nose normal. "   Eyes: Conjunctivae and EOM are normal. Pupils are equal, round, and reactive to light.   Neck: Normal range of motion.   Cardiovascular: Normal rate and regular rhythm.   Pulmonary/Chest: Effort normal.   Diminished but clear   Musculoskeletal: Normal range of motion.   Neurological: He is alert and oriented to person, place, and time.   Psychiatric: He has a normal mood and affect. His behavior is normal. Judgment and thought content normal.   Nursing note and vitals reviewed.          PAST MEDICAL HISTORY     Past Medical History:   Diagnosis Date   • Allergic rhinitis    • Allergic rhinitis due to pollen    • Anxiety state    • Atopic dermatitis    • Backache    • Chronic obstructive lung disease (CMS/HCC)    • Common cold    • Constipation    • Contact dermatitis    • Cough    • Diarrhea    • Drug therapy     Long-term drug therapy     • Dyspnea    • Exanthematous disorder    • Hypoxia    • Itch of skin    • Malaise and fatigue     Other malaise and fatigue     • Male erectile disorder    • Nocturia     finding     • Osteoarthritis    • Pain in joint, ankle and foot    • Rash     O/E - a rash    • Screening for malignant neoplasm of prostate    • Upper respiratory infection       PAST SURGICAL HISTORY     Past Surgical History:   Procedure Laterality Date   • CATARACT EXTRACTION Left 12/09/2003    Cataract removal (1)   - Left eye   • INJECTION OF MEDICATION  09/09/2015    Kenalog (11) - EDA Pierson      SOCIAL HISTORY     Social History     Socioeconomic History   • Marital status:      Spouse name: Not on file   • Number of children: Not on file   • Years of education: Not on file   • Highest education level: Not on file   Tobacco Use   • Smoking status: Former Smoker   • Smokeless tobacco: Former User   Substance and Sexual Activity   • Alcohol use: No   • Drug use: No   • Sexual activity: Defer      ALLERGIES   Patient has no known allergies.   MEDICATIONS     Current Outpatient Medications   Medication  Sig Dispense Refill   • ipratropium-albuterol (DUO-NEB) 0.5-2.5 mg/3 ml nebulizer Take 3 mL by nebulization Every 4 (Four) Hours As Needed for Wheezing. 360 mL 0   • acetaminophen (TYLENOL) 325 MG tablet Take 2 tablets by mouth Every 4 (Four) Hours As Needed for Mild Pain . 30 tablet 0   • albuterol (PROVENTIL) (2.5 MG/3ML) 0.083% nebulizer solution Take 2.5 mg by nebulization 4 (Four) Times a Day. Inhale via nebulizer four times daily as needed. 300 mL 12   • ammonium lactate (LAC-HYDRIN) 12 % lotion Apply  topically. ammonium lactate 12 % lotion  -  Apply as directed 2 times per day as needed.      • busPIRone (BUSPAR) 5 MG tablet Take 1 tablet by mouth 3 (Three) Times a Day. 90 tablet 5   • cyproheptadine (PERIACTIN) 4 MG tablet Take 1 tablet by mouth 3 (Three) Times a Day As Needed for Allergies. 40 tablet 1   • famotidine (PEPCID) 40 MG tablet Take 1 tablet by mouth Daily. 30 tablet 11   • furosemide (LASIX) 40 MG tablet Take 1 tablet by mouth Daily. 1 tablet daily as needed for swelling 30 tablet 2   • hydrOXYzine (ATARAX) 25 MG tablet Take 1 tablet by mouth Every 6 (Six) Hours As Needed for Itching. 120 tablet 11   • ipratropium (ATROVENT) 0.02 % nebulizer solution Take 2.5 mL by nebulization 4 (Four) Times a Day. 300 mL 12   • loratadine (CLARITIN) 10 MG tablet Take 1 tablet by mouth Daily. 30 tablet 11   • megestrol (MEGACE ORAL) 40 MG/ML suspension Take 10 mL by mouth Daily. 300 mL 2   • methylphenidate (RITALIN) 10 MG tablet Take  by mouth. Take 1 or 2 tablets daily.     • modafinil (PROVIGIL) 200 MG tablet TAKE ONE TABLET BY MOUTH ONCE DAILY 30 tablet 5   • nystatin (MYCOSTATIN) 992490 UNIT/ML suspension Swish and swallow 5 mL 4 (Four) Times a Day. Swish for 30 seconds then swallow 240 mL 5   • oxyCODONE-acetaminophen (PERCOCET) 5-325 MG per tablet Take 1 tablet by mouth Every 4 (Four) Hours As Needed for Moderate Pain . 120 tablet 0   • sildenafil (VIAGRA) 100 MG tablet 1/2 to 1 tablet 30 min up to 4  hours before sex. 5 tablet 11   • traMADol (ULTRAM) 50 MG tablet Take 1 tablet by mouth Every 6 (Six) Hours As Needed for Moderate Pain . 120 tablet 2   • traZODone (DESYREL) 50 MG tablet Take 1-3 tablets by mouth Every Night. 90 tablet 11   • umeclidinium-vilanterol (ANORO ELLIPTA) 62.5-25 MCG/INH aerosol powder  inhaler Inhale 1 puff Daily. 1 each 11     No current facility-administered medications for this visit.         The following portions of the patient's history were reviewed and updated as appropriate: allergies, current medications, past family history, past medical history, past social history, past surgical history and problem list.        Assessment/Plan   Fuad was seen today for follow-up.    Diagnoses and all orders for this visit:    Other fatigue    Mixed simple and mucopurulent chronic bronchitis (CMS/HCC)    Anorexia    Other orders  -     megestrol (MEGACE ORAL) 40 MG/ML suspension; Take 10 mL by mouth Daily.    kenalog 80mg im and will send in megace.    Megace to help his appetite               No Follow-up on file.                  This document has been electronically signed by Thomas Pierson MD on May 22, 2019 1:26 PM

## 2019-06-04 NOTE — PROGRESS NOTES
" Subjective   Fuad Brumfield is a 78 y.o. male.     History of Present Illness     Fell yesterday and skin tear left elbow.  Home health dressed wound.   He says megace causes severe diarrhea and doesn't want any more.  He wants a pill.  Appetite is chronic problem.      Review of Systems   Constitutional: Negative for chills, fatigue and fever.   HENT: Negative for congestion, ear discharge, ear pain, facial swelling, hearing loss, postnasal drip, rhinorrhea, sinus pressure, sore throat, trouble swallowing and voice change.    Eyes: Negative for discharge, redness and visual disturbance.   Respiratory: Negative for cough, chest tightness, shortness of breath and wheezing.    Cardiovascular: Negative for chest pain and palpitations.   Gastrointestinal: Negative for abdominal pain, blood in stool, constipation, diarrhea, nausea and vomiting.   Endocrine: Negative for polydipsia and polyuria.   Genitourinary: Negative for dysuria, flank pain, hematuria and urgency.   Musculoskeletal: Negative for arthralgias, back pain, joint swelling and myalgias.   Skin: Negative for rash.   Neurological: Negative for dizziness, weakness, numbness and headaches.   Hematological: Negative for adenopathy.   Psychiatric/Behavioral: Negative for confusion and sleep disturbance. The patient is not nervous/anxious.            /88 (BP Location: Left arm, Patient Position: Sitting, Cuff Size: Adult)   Pulse 94   Temp 98 °F (36.7 °C) (Temporal)   Ht 180.3 cm (70.98\")   Wt 54.4 kg (120 lb)   SpO2 95%   BMI 16.74 kg/m²       Objective     Physical Exam   Constitutional: He is oriented to person, place, and time. He appears well-developed and well-nourished.   HENT:   Head: Normocephalic and atraumatic.   Right Ear: External ear normal.   Left Ear: External ear normal.   Nose: Nose normal.   Eyes: Conjunctivae and EOM are normal. Pupils are equal, round, and reactive to light.   Neck: Normal range of motion.   Pulmonary/Chest: " Effort normal.   Musculoskeletal: Normal range of motion.   Neurological: He is alert and oriented to person, place, and time.   Skin:   Skin tear about 4.5cm x 4.5cm left elbow. Skin not approximated together, no redness, signs of infection or uncleanliness.    Psychiatric: He has a normal mood and affect. His behavior is normal. Judgment and thought content normal.   Nursing note and vitals reviewed.          PAST MEDICAL HISTORY     Past Medical History:   Diagnosis Date   • Allergic rhinitis    • Allergic rhinitis due to pollen    • Anxiety state    • Atopic dermatitis    • Backache    • Chronic obstructive lung disease (CMS/HCC)    • Common cold    • Constipation    • Contact dermatitis    • Cough    • Diarrhea    • Drug therapy     Long-term drug therapy     • Dyspnea    • Exanthematous disorder    • Hypoxia    • Itch of skin    • Malaise and fatigue     Other malaise and fatigue     • Male erectile disorder    • Nocturia     finding     • Osteoarthritis    • Pain in joint, ankle and foot    • Rash     O/E - a rash    • Screening for malignant neoplasm of prostate    • Upper respiratory infection       PAST SURGICAL HISTORY     Past Surgical History:   Procedure Laterality Date   • CATARACT EXTRACTION Left 12/09/2003    Cataract removal (1)   - Left eye   • INJECTION OF MEDICATION  09/09/2015    Kenalog (11) - EDA Pierson      SOCIAL HISTORY     Social History     Socioeconomic History   • Marital status:      Spouse name: Not on file   • Number of children: Not on file   • Years of education: Not on file   • Highest education level: Not on file   Tobacco Use   • Smoking status: Former Smoker   • Smokeless tobacco: Former User   Substance and Sexual Activity   • Alcohol use: No   • Drug use: No   • Sexual activity: Defer      ALLERGIES   Patient has no known allergies.   MEDICATIONS     Current Outpatient Medications   Medication Sig Dispense Refill   • acetaminophen (TYLENOL) 325 MG tablet Take 2 tablets  by mouth Every 4 (Four) Hours As Needed for Mild Pain . 30 tablet 0   • albuterol (PROVENTIL) (2.5 MG/3ML) 0.083% nebulizer solution Take 2.5 mg by nebulization 4 (Four) Times a Day. Inhale via nebulizer four times daily as needed. 300 mL 12   • ammonium lactate (LAC-HYDRIN) 12 % lotion Apply  topically. ammonium lactate 12 % lotion  -  Apply as directed 2 times per day as needed.      • busPIRone (BUSPAR) 5 MG tablet Take 1 tablet by mouth 3 (Three) Times a Day. 90 tablet 5   • cyproheptadine (PERIACTIN) 4 MG tablet Take 1 tablet by mouth 3 (Three) Times a Day As Needed for Allergies. 40 tablet 1   • dronabinol (MARINOL) 2.5 MG capsule Take 1 capsule by mouth 2 (Two) Times a Day Before Meals. 60 capsule 5   • famotidine (PEPCID) 40 MG tablet Take 1 tablet by mouth Daily. 30 tablet 11   • furosemide (LASIX) 40 MG tablet Take 1 tablet by mouth Daily. 1 tablet daily as needed for swelling 30 tablet 2   • hydrOXYzine (ATARAX) 25 MG tablet Take 1 tablet by mouth Every 6 (Six) Hours As Needed for Itching. 120 tablet 11   • ipratropium (ATROVENT) 0.02 % nebulizer solution Take 2.5 mL by nebulization 4 (Four) Times a Day. 300 mL 12   • ipratropium-albuterol (DUO-NEB) 0.5-2.5 mg/3 ml nebulizer Take 3 mL by nebulization Every 4 (Four) Hours As Needed for Wheezing. 360 mL 0   • loratadine (CLARITIN) 10 MG tablet Take 1 tablet by mouth Daily. 30 tablet 11   • megestrol (MEGACE ORAL) 40 MG/ML suspension Take 10 mL by mouth Daily. 300 mL 2   • methylphenidate (RITALIN) 10 MG tablet Take  by mouth. Take 1 or 2 tablets daily.     • modafinil (PROVIGIL) 200 MG tablet TAKE ONE TABLET BY MOUTH ONCE DAILY 30 tablet 5   • nystatin (MYCOSTATIN) 202889 UNIT/ML suspension Swish and swallow 5 mL 4 (Four) Times a Day. Swish for 30 seconds then swallow 240 mL 5   • oxyCODONE-acetaminophen (PERCOCET) 5-325 MG per tablet Take 1 tablet by mouth Every 4 (Four) Hours As Needed for Moderate Pain . 120 tablet 0   • sildenafil (VIAGRA) 100 MG tablet  1/2 to 1 tablet 30 min up to 4 hours before sex. 5 tablet 11   • traMADol (ULTRAM) 50 MG tablet Take 1 tablet by mouth Every 6 (Six) Hours As Needed for Moderate Pain . 120 tablet 2   • traZODone (DESYREL) 50 MG tablet Take 1-3 tablets by mouth Every Night. 90 tablet 11   • umeclidinium-vilanterol (ANORO ELLIPTA) 62.5-25 MCG/INH aerosol powder  inhaler Inhale 1 puff Daily. 1 each 11     No current facility-administered medications for this visit.         The following portions of the patient's history were reviewed and updated as appropriate: allergies, current medications, past family history, past medical history, past social history, past surgical history and problem list.        Assessment/Plan   Fuad was seen today for fall.    Diagnoses and all orders for this visit:    Anorexia  -     dronabinol (MARINOL) 2.5 MG capsule; Take 1 capsule by mouth 2 (Two) Times a Day Before Meals.    Skin tear of left elbow without complication, initial encounter      Using sterile instruments, I tried approximating skin together, there wasn't enough to do so and he was uncomfortable during procedure.  Keep dressing wound as per home health.      Try marinol.  Call [facility] before getting medicine since may have to order.                No Follow-up on file.                  This document has been electronically signed by Thomas Pierson MD on June 4, 2019 2:01 PM

## 2019-06-06 PROBLEM — J18.9 PNEUMONIA: Status: ACTIVE | Noted: 2019-01-01

## 2019-06-06 PROBLEM — R06.02 SOB (SHORTNESS OF BREATH): Status: ACTIVE | Noted: 2019-01-01

## 2019-06-06 NOTE — ED TRIAGE NOTES
Pt slipped and fell around 45 minutes prior to arrival. Pt c/o right hip pain. Skin tears noted to right forearm. Pt has bandage on left forearm covering previous skin tears.

## 2019-06-06 NOTE — ED PROVIDER NOTES
"Subjective   79-year-old white male presents the emergency department with complaint of a fall.  His daughter presents the bedside and states that he fell today and landed on his right hip.  They were concerned that he may have broken his hip.  She states that he has a lot of issues with anxiety and always has.      Additionally, he has lung cancer and she states it is \"terminal.\"  He is a DNR.            Review of Systems   Constitutional: Negative for chills, diaphoresis, fatigue and fever.   HENT: Negative for nosebleeds, sore throat, trouble swallowing and voice change.    Eyes: Negative for pain and visual disturbance.   Respiratory: Negative for shortness of breath.    Cardiovascular: Negative for chest pain and palpitations.   Gastrointestinal: Negative for vomiting.   Endocrine: Negative for polydipsia and polyuria.   Genitourinary: Negative for difficulty urinating and dysuria.   Musculoskeletal: Negative for arthralgias and back pain.   Skin: Negative for color change and rash.   Allergic/Immunologic: Negative for immunocompromised state.   Neurological: Negative for weakness and numbness.   Hematological: Negative for adenopathy.   Psychiatric/Behavioral: Negative for agitation and confusion.       Past Medical History:   Diagnosis Date   • Allergic rhinitis    • Allergic rhinitis due to pollen    • Anxiety state    • Atopic dermatitis    • Backache    • Chronic obstructive lung disease (CMS/HCC)    • Common cold    • Constipation    • Contact dermatitis    • Cough    • Diarrhea    • Drug therapy     Long-term drug therapy     • Dyspnea    • Exanthematous disorder    • Hypoxia    • Itch of skin    • Malaise and fatigue     Other malaise and fatigue     • Male erectile disorder    • Nocturia     finding     • Osteoarthritis    • Pain in joint, ankle and foot    • Rash     O/E - a rash    • Screening for malignant neoplasm of prostate    • Upper respiratory infection        No Known Allergies    Past " Surgical History:   Procedure Laterality Date   • CATARACT EXTRACTION Left 12/09/2003    Cataract removal (1)   - Left eye   • INJECTION OF MEDICATION  09/09/2015    Kenalog (11) - EDA Pierson       History reviewed. No pertinent family history.    Social History     Socioeconomic History   • Marital status:      Spouse name: Not on file   • Number of children: Not on file   • Years of education: Not on file   • Highest education level: Not on file   Tobacco Use   • Smoking status: Former Smoker   • Smokeless tobacco: Former User   Substance and Sexual Activity   • Alcohol use: No   • Drug use: No   • Sexual activity: Defer           Objective   Physical Exam   Constitutional: He is oriented to person, place, and time. He appears well-developed and well-nourished. No distress.   HENT:   Head: Normocephalic and atraumatic.   Right Ear: External ear normal.   Left Ear: External ear normal.   Mouth/Throat: Oropharynx is clear and moist. No oropharyngeal exudate.   Eyes: Conjunctivae and EOM are normal. Pupils are equal, round, and reactive to light. Right eye exhibits no discharge. Left eye exhibits no discharge. No scleral icterus.   Neck: Neck supple. No JVD present. No tracheal deviation present. No thyromegaly present.   Cardiovascular: Normal rate, regular rhythm, normal heart sounds and intact distal pulses. Exam reveals no friction rub.   No murmur heard.  Pulmonary/Chest: Effort normal and breath sounds normal. No stridor. No respiratory distress. He has no wheezes. He has no rales. He exhibits no tenderness.   He has hypoxia at baseline with a pulse ox around 82% normally.   Abdominal: Soft. Bowel sounds are normal. He exhibits no distension and no mass. There is no tenderness. There is no rebound and no guarding.   Musculoskeletal: He exhibits tenderness. He exhibits no edema or deformity.   There is some tenderness on the right hip with palpation of the greater trochanter.  He has pain with abduction of  his right leg.   Lymphadenopathy:     He has no cervical adenopathy.   Neurological: He is alert and oriented to person, place, and time. No cranial nerve deficit. He exhibits normal muscle tone. Coordination normal.   Skin: Skin is warm and dry. Capillary refill takes 2 to 3 seconds. No rash noted. He is not diaphoretic. No erythema.   Psychiatric: Judgment and thought content normal.   He is very anxious   Nursing note and vitals reviewed.      Procedures           ED Course  ED Course as of Jun 06 1907   Th Jun 06, 2019 1901 Patient was placed in room 2 and evaluated by me.  He was having some difficulty breathing on arrival and was given lorazepam and morphine for symptom control.  He was placed on a nonrebreather and his pulse ox improved to the low 90s.  However, on reevaluation, his work of breathing was increased and he was placed on BiPAP and markedly improved.  Labs were obtained and showed a white blood cell count at 19,000.  He was febrile at 100.7.  He was given Rocephin and azithromycin in the emergency department and the case was discussed with Dr. Rabago who agreed to admit the patient to Step-down  [CE]      ED Course User Index  [CE] Clifford Tai,         Labs Reviewed   COMPREHENSIVE METABOLIC PANEL - Abnormal; Notable for the following components:       Result Value    Glucose 133 (*)     CO2 18.0 (*)     eGFR Non  Amer 55 (*)     All other components within normal limits    Narrative:     GFR Normal >60  Chronic Kidney Disease <60  Kidney Failure <15   CBC WITH AUTO DIFFERENTIAL - Abnormal; Notable for the following components:    WBC 19.42 (*)     Neutrophil % 79.3 (*)     Lymphocyte % 10.1 (*)     Immature Grans % 1.5 (*)     Neutrophils, Absolute 15.39 (*)     Monocytes, Absolute 1.52 (*)     Immature Grans, Absolute 0.29 (*)     All other components within normal limits   BLOOD GAS, ARTERIAL - Abnormal; Notable for the following components:    pCO2, Arterial 19.9 (*)      pO2, Arterial 34.3 (*)     HCO3, Arterial 13.8 (*)     Base Excess, Arterial -7.8 (*)     O2 Saturation, Arterial 71.8 (*)     All other components within normal limits   PROTIME-INR - Normal    Narrative:     Therapeutic range for most indications is 2.0-3.0 INR,  or 2.5-3.5 for mechanical heart valves.   BLOOD CULTURE   BLOOD CULTURE   BLOOD GAS, ARTERIAL   RAINBOW DRAW    Narrative:     The following orders were created for panel order [place] Draw.  Procedure                               Abnormality         Status                     ---------                               -----------         ------                     Gold Top - SST[[id number]]                                                                Please view results for these tests on the individual orders.   LACTIC ACID, PLASMA   CBC AND DIFFERENTIAL    Narrative:     The following orders were created for panel order CBC & Differential.  Procedure                               Abnormality         Status                     ---------                               -----------         ------                     CBC Auto Differential[[id number]]        Abnormal            Final result                 Please view results for these tests on the individual orders.   GOLD TOP - SST   EXTRA TUBES    Narrative:     The following orders were created for panel order Extra Tubes.  Procedure                               Abnormality         Status                     ---------                               -----------         ------                     Light Blue Top[[id number]]                                   In process                 Lavender Top[[id number]]                                     In process                 Gold Top - SST[[id number]]                                   In process                 Green Top (Gel)[[id number]]                                  In process                   Please view results for these tests on the individual orders.    LIGHT BLUE TOP   LAVENDER TOP   GOLD TOP - SST   GREEN TOP     Ct Chest With Contrast    Result Date: 5/15/2019  Narrative: [facility] [place] Patient Name: ERA MEJIA ORDERING: NICOLE CARTWRIGHT ATTENDING: OTILIO BEYER REFERRING: NICOLE CARTWRIGHT ----------------------- PROCEDURE: CT SCAN OF THE CHEST WITH INTRAVENOUS CONTRAST. CLINICAL INFORMATION:  recheck lung mass, Trauma to chest last week., R74.8 Abnormal levels of other serum enzymes R11.2 Nausea with vomiting, unspecified R53.1 Weakness I71.4 Abdominal aortic aneurysm, without rupture Z74.09 Other reduced mobility Z74.09 Other reduced mobility This exam was performed using radiation doses that are as low as reasonably achievable (ALARA). This exam was performed according to our departmental dose optimization program, which includes automated exposure control, adjustment of the mA and/or KV according to patient size and/or use of iterative reconstruction technique. COMPARISON: PET/CT dated 7/13/2018. Chest CTA dated 6/25/2018. CONTRAST: 80 cc intravenous Isovue 300 TECHNIQUE: Axial images were obtained and multiplanar reconstructions were made.  FINDINGS: LUNGS/PLEURA: There are moderately severe emphysematous changes. No suspicious lung nodule is seen. TRACHEA AND BRONCHI:  The trachea and bronchi are patent. MEDIASTINUM, MARK AND LYMPH NODES: There is a stable enlarged subcarinal lymph node measuring 2.4 cm in short axis without significant change in appearance compared to the prior exams. HEART AND PERICARDIUM: The heart and pericardium are normal. VASCULAR: Unremarkable UPPER ABDOMEN: Unremarkable OSSEOUS STRUCTURES: No acute osseous abnormality is seen. There are degenerative changes of the lower thoracic and lower cervical spine.     Impression: CONCLUSION: No suspicious lung nodule. Moderately severe emphysematous changes. Significantly enlarged subcarinal lymph node which appears stable compared to prior exams. Electronically  signed by:  Joao Monte MD  5/15/2019 3:50 PM CDT Workstation: [id number]    Ct Abdomen Pelvis With Contrast    Result Date: 5/14/2019  Narrative: Exam: CT abdomen  pelvis with contrast. INDICATION: Weakness, history of lung carcinoma TECHNIQUE: Routine CT abdomen pelvis with contrast. Sagittal coronal reconstructions were obtained. This exam was performed according to our departmental dose-optimization program, which includes automated exposure control, adjustment of the mA and/or kV according to patient size and/or use of iterative reconstruction technique. FINDINGS: Pulmonary emphysematous changes are present. Mild atelectasis is present in the posterior aspects of the lower lungs. The liver, spleen, pancreas and adrenal glands gallbladder and kidneys unremarkable. No urinary tract calculus or hydronephrosis. There is mild dilatation of infrarenal abdominal aorta measuring up to 2.8 cm. No significant adenopathy. No bowel obstruction or abnormal bowel wall thickening. Diverticula disease is present. No evidence of diverticulitis. Normal appendix. No abnormal stranding in the mesentery or ascites. Bladder is unremarkable. The degenerative changes present in the spine.     Impression: 1. No obvious acute abnormality 2. Diverticulosis 3. Dilatation of the infrarenal abdominal aorta measuring up to 2.8 cm. AAA Size: Follow-up Recommendation (1): 2.6 - 2.9 cm   Every 5 years (2) 3.0 - 3.4 cm   Every 3 years 3.5 - 3.9 cm   Every 12 months 4.0 - 4.4 cm   Every 12 months, vasc consult rec 4.5 - 5.4 cm   Every 6 months, vasc consult rec >=5.5 cm   Referral to vascular surgeon recommended ______________________________________________________ (1)Based upon the Society for Vascular Surgery Guidelines:  J Vasc Surg. 2009 Oct;50(4 Suppl):S2-49 (2)For aortas of max dung of 2.6-2.9 cm that meet criteria for AAA (>= 1.5 x proximal normal segment) 4. Pulmonary emphysema Electronically signed by:  Duke Boateng MD  5/14/2019 10:50 PM  CDT Workstation: [id number]    Xr Chest 1 View    Result Date: 6/6/2019  Narrative: PROCEDURE: XR CHEST 1 VW VIEWS:Single INDICATION: Shortness of breath, history of lung cancer COMPARISON: CT chest dated 5/15/2019 and chest x-ray dated 5/14/2019 FINDINGS:   - lines/tubes: None   - cardiac: Size within normal limits.   - mediastinum: Atherosclerotic calcification of the aorta. Mediastinal contour is unchanged since the previous study. Lungs are hyperinflated.   - lungs: No evidence of a focal air space process 2. Chronic appearing interstitial prominence.  - pleura: No evidence of  fluid.    - osseous: Unremarkable for age.     Impression: Chronic changes as above. No acute abnormality identified. Electronically signed by:  Lona Hancock MD  6/6/2019 5:51 PM CDT Workstation: [id number]    Xr Chest 1 View    Result Date: 5/14/2019  Narrative: EXAM:         Radiograph(s), Chest VIEWS:   Frontal  ; 1     DATE/TIME:  5/14/2019 8:37 PM CDT            INDICATION:   weakness, history of lung cancer  COMPARISON:  CXR: 10/19/18         FINDINGS:         - lines/tubes:    none   - cardiac:         size within normal limits       - mediastinum: contour within normal limits       - lungs:         no focal air space process, pulmonary interstitial edema, nodule(s)/mass           - pleura:         no evidence of  fluid                - osseous:         unremarkable for age                - misc.:        Impression: CONCLUSION:    1. No evidence of an active cardiopulmonary process.                                                  Electronically signed by:  MAXINE Gallegos MD  5/14/2019 8:38 PM CDT Workstation: [id number]    Xr Hip With Or Without Pelvis 2 - 3 View Right    Result Date: 6/6/2019  Narrative: Procedure:  Right hip, pelvis    Indication:  Trauma, patient fell.   . Technique:  Two views right hip. Single frontal view pelvis.   . Prior relevant exam: CT abdomen pelvis May 14, 2019.   No evidence of acute bony, soft  tissue, or joint abnormality is noted. Bone mineralization is within normal limits. The visualized joint spaces appear intact. Arteriovascular calcifications right femoral artery.     Impression: CONCLUSION: 1.  Normal right hip, pelvis.   Electronically signed by:  Sabino Hastings MD  6/6/2019 5:55 PM CDT Workstation: [id number]            [facility]      Final diagnoses:   SOB (shortness of breath)            Clifford Tai DO  06/06/19 1909

## 2019-06-06 NOTE — ED NOTES
Laboratory notified of need for lactic acid and blood cultures.      Brooke Garcia, RN  06/06/19 1200

## 2019-06-07 NOTE — SIGNIFICANT NOTE
06/06/19 2249   Vital Signs   Heart Rate (!) 122   Heart Rate Source Monitor   Notified Dr Dudley of Pt HR remains ST, No orders received

## 2019-06-07 NOTE — PLAN OF CARE
Problem: Patient Care Overview  Goal: Plan of Care Review  Outcome: Ongoing (interventions implemented as appropriate)  Pt receiving Comfort Care.  Monitor for change in status.   06/07/19 7490   Coping/Psychosocial   Plan of Care Reviewed With spouse   Plan of Care Review   Progress no change   OTHER   Outcome Summary initail assessment

## 2019-06-07 NOTE — CONSULTS
Adult Nutrition  Assessment    Patient Name:  Fuad Brumfield  YOB: 1940  MRN: [id number]  Admit Date:  6/6/2019    Assessment Date:  6/7/2019    Comments:  Pt is NPO receiving Comfort Care.  Wife present and made no request.  Meds and labs reviewed.  Pt with right lower posterior arm skin tear, left lower posterior arm skin tear.  BMI 16 with pt at 72% IBW.  Will monitor for change in status.     Reason for Assessment     Row Name 06/07/19 1443          Reason for Assessment    Reason For Assessment  per organizational policy;identified at risk by screening criteria underweight, wounds     Identified At Risk by Screening Criteria  MST SCORE 2+;unintentional loss of 10 lbs or more in the past 2 mos             Labs/Tests/Procedures/Meds     Row Name 06/07/19 1444          Labs/Procedures/Meds    Lab Results Reviewed  reviewed        Medications    Pertinent Medications Reviewed  reviewed         Physical Findings     Row Name 06/07/19 1444          Physical Findings    Skin  other (see comments) right lower posterior arm skin tear, left lower posterior arm skin tear         Estimated/Assessed Needs     Row Name 06/07/19 1445          Calculation Measurements    Weight Used For Calculations  75.4 kg (166 lb 3.6 oz)        Estimated/Assessed Needs    Additional Documentation  Calorie Requirements (Group);Fluid Requirements (Group);[place]-St. Jeor Equation (Group);Protein Requirements (Group)        Calorie Requirements    Estimated Calorie Requirement (kcal/day)  1939     Estimated Calorie Need Method  [place]-St Jeor        [place]-St. Jeor Equation    RMR ([place]-St. Jeor Equation)  1491.125        Fluid Requirements    Estimated Fluid Requirements (mL/day)  1886     RDA Method (mL)  1886     [place] Method (over 20 kg)  3008         Nutrition Prescription Ordered     Row Name 06/07/19 1448          Nutrition Prescription PO    Current PO Diet  NPO         Evaluation of Received  Nutrient/Fluid Intake     Row Name 06/07/19 1445          Calculation Measurements    Weight Used For Calculations  75.4 kg (166 lb 3.6 oz)         Evaluation of Prescribed Nutrient/Fluid Intake     Row Name 06/07/19 1445          Calculation Measurements    Weight Used For Calculations  75.4 kg (166 lb 3.6 oz)             Electronically signed by:  Peggy Rangel RD  06/07/19 2:48 PM

## 2019-06-07 NOTE — PLAN OF CARE
Problem: NPPV/CPAP (Adult)  Intervention: Optimize Tolerance of Noninvasive Ventilation  Patient wore Bipap during hour of rest. Applies Resmed to mask due to red areas around the nose. Patient tolerated well with Ativan.

## 2019-06-07 NOTE — H&P
[facility] Medicine Services  INPATIENT HISTORY AND PHYSICAL       Patient Care Team:  Thomas Pierson MD as PCP - General (Family Medicine)    Chief complaint   Chief Complaint   Patient presents with   • Fall   • Hip Pain       Subjective     Patient is a 79 y.o. male with history of metastatic lung cancer, COPD, previous history of nicotine dependence, GERD who was brought by the daughter to the emergency department secondary to progressively worsening shortness of air, declining functional status, decreased oral intake and repeated falls.    Daughter reports that patient did fall today but there was no head injury or loss of consciousness.  No reported history of fever, chills, nausea, vomiting, chest pain, hematemesis, melena, hematochezia or contact with anyone with febrile illness.    On triage patient was febrile with a temperature of 101.4 and tachypneic with respiratory rate ranging from 20-32.  ABG showed pH of 7.449, PCO2 19.9, PO2 34 and O2 saturation of 71% on 50% FiO2.  Chest x-ray did not show any acute changes and CBC showed leukocytosis of 19.42 left shift.  Lactic acid is 1.4 and x-ray of the hip was unremarkable.    Patient was commenced on noninvasive respiratory therapy (BiPAP), started on IV antibiotics and referred for admission.    Patient  is not able to give any history secondary to severe respiratory distress with altered mental status.  This dictation was done through review of patient's medical records and information obtained from patient's daughter.      Review of Systems  Unable to review due to altered level of consciousness.    History  Past Medical History:   Diagnosis Date   • Allergic rhinitis    • Allergic rhinitis due to pollen    • Anxiety state    • Atopic dermatitis    • Backache    • Chronic obstructive lung disease (CMS/HCC)    • Common cold    • Constipation    • Contact dermatitis    • Cough    • Diarrhea    • Drug therapy      Long-term drug therapy     • Dyspnea    • Exanthematous disorder    • Hypoxia    • Itch of skin    • Malaise and fatigue     Other malaise and fatigue     • Male erectile disorder    • Nocturia     finding     • Osteoarthritis    • Pain in joint, ankle and foot    • Rash     O/E - a rash    • Screening for malignant neoplasm of prostate    • Upper respiratory infection      Past Surgical History:   Procedure Laterality Date   • CATARACT EXTRACTION Left 12/09/2003    Cataract removal (1)   - Left eye   • INJECTION OF MEDICATION  09/09/2015    Kenalog (11) - EDA Pierson     History reviewed. No pertinent family history.  Social History     Tobacco Use   • Smoking status: Former Smoker   • Smokeless tobacco: Former User   Substance Use Topics   • Alcohol use: No   • Drug use: No       (Not in a hospital admission)  Allergies:  Patient has no known allergies.  Prior to Admission medications    Medication Sig Start Date End Date Taking? Authorizing Provider   acetaminophen (TYLENOL) 325 MG tablet Take 2 tablets by mouth Every 4 (Four) Hours As Needed for Mild Pain . 6/27/18  Yes Wolfgang Rabago MD   albuterol (PROVENTIL) (2.5 MG/3ML) 0.083% nebulizer solution Take 2.5 mg by nebulization 4 (Four) Times a Day. Inhale via nebulizer four times daily as needed. 6/4/18  Yes Thomas Pierson MD   ammonium lactate (LAC-HYDRIN) 12 % lotion Apply  topically. ammonium lactate 12 % lotion  -  Apply as directed 2 times per day as needed.    Yes ProviderRose MD   azithromycin (ZITHROMAX) 250 MG tablet Take 2 tablets the first day, then 1 tablet daily for 4 days. 6/6/19  Yes Thomas Pierson MD   busPIRone (BUSPAR) 5 MG tablet Take 1 tablet by mouth 3 (Three) Times a Day. 2/4/19  Yes Thomas Pierson MD   cyproheptadine (PERIACTIN) 4 MG tablet Take 1 tablet by mouth 3 (Three) Times a Day As Needed for Allergies. 4/16/19  Yes Indio Larsen MD   dronabinol (MARINOL) 2.5 MG capsule Take 1 capsule by mouth 2 (Two) Times a Day  Before Meals. 6/4/19  Yes Thomas Pierson MD   famotidine (PEPCID) 40 MG tablet Take 1 tablet by mouth Daily. 8/2/18  Yes Thomas Pierson MD   furosemide (LASIX) 40 MG tablet Take 1 tablet by mouth Daily. 1 tablet daily as needed for swelling 12/4/18  Yes Indio Larsen MD   hydrOXYzine (ATARAX) 25 MG tablet Take 1 tablet by mouth Every 6 (Six) Hours As Needed for Itching. 12/19/17  Yes Thomas Pierson MD   ipratropium (ATROVENT) 0.02 % nebulizer solution Take 2.5 mL by nebulization 4 (Four) Times a Day. 6/4/18  Yes Thomas Pierson MD   ipratropium-albuterol (DUO-NEB) 0.5-2.5 mg/3 ml nebulizer Take 3 mL by nebulization Every 4 (Four) Hours As Needed for Wheezing. 6/27/18  Yes Wolfgang Rabago MD   loratadine (CLARITIN) 10 MG tablet Take 1 tablet by mouth Daily. 6/5/18  Yes Thomas Pierson MD   methylphenidate (RITALIN) 10 MG tablet Take  by mouth. Take 1 or 2 tablets daily.   Yes Rose Doty MD   modafinil (PROVIGIL) 200 MG tablet TAKE ONE TABLET BY MOUTH ONCE DAILY 7/25/18  Yes Thomas Pierson MD   nystatin (MYCOSTATIN) 621974 UNIT/ML suspension Swish and swallow 5 mL 4 (Four) Times a Day. Swish for 30 seconds then swallow 7/2/18  Yes Thomas Pierson MD   oxyCODONE-acetaminophen (PERCOCET) 5-325 MG per tablet Take 1 tablet by mouth Every 4 (Four) Hours As Needed for Moderate Pain . 8/23/18  Yes Thomas Pierson MD   sildenafil (VIAGRA) 100 MG tablet 1/2 to 1 tablet 30 min up to 4 hours before sex. 1/22/18  Yes Thomas Pierson MD   traMADol (ULTRAM) 50 MG tablet Take 1 tablet by mouth Every 6 (Six) Hours As Needed for Moderate Pain . 5/28/19  Yes Thomas Pierson MD   traZODone (DESYREL) 50 MG tablet Take 1-3 tablets by mouth Every Night. 12/3/18  Yes Thomas Pierson MD   umeclidinium-vilanterol (ANORO ELLIPTA) 62.5-25 MCG/INH aerosol powder  inhaler Inhale 1 puff Daily. 5/29/18  Yes Thomas Pierson MD       Objective        Vital Signs  Temp:  [98.3 °F (36.8 °C)-101.4 °F (38.6 °C)] 100.7 °F  (38.2 °C)  Heart Rate:  [112-136] 134  Resp:  [20-32] 31  BP: (139-187)/() 139/86  FiO2 (%):  [40 %] 40 %      Physical Exam   Constitutional: He appears well-developed and well-nourished. He has a sickly appearance. He appears ill. No distress.   HENT:   Head: Normocephalic and atraumatic.   He has dry oral mucosa.   Eyes: Pupils are equal, round, and reactive to light. No scleral icterus.   Neck: Neck supple. No JVD present. No thyromegaly present.   Cardiovascular: Normal rate, regular rhythm and normal heart sounds. Exam reveals no gallop and no friction rub.   No murmur heard.  Pulmonary/Chest: Accessory muscle usage present. Tachypnea noted. He is in respiratory distress. He has decreased breath sounds. He has wheezes. He has rhonchi. He has no rales. He exhibits no tenderness.   Abdominal: Soft. Bowel sounds are normal. He exhibits no distension and no mass. There is no tenderness. There is no rebound and no guarding.   Musculoskeletal: He exhibits no edema, tenderness or deformity.   Neurological: He exhibits normal muscle tone.   Patient is able to respond to sternal rub and moves all extremities.   Skin: Skin is warm and dry. No rash noted. He is not diaphoretic. No erythema. No pallor.   Nursing note and vitals reviewed.        Results Review:     Results from last 7 days   Lab Units 06/06/19  1735   SODIUM mmol/L 138   POTASSIUM mmol/L 4.5   CHLORIDE mmol/L 105   CO2 mmol/L 18.0*   BUN mg/dL 19   CREATININE mg/dL 1.27   GLUCOSE mg/dL 133*   CALCIUM mg/dL 8.7   BILIRUBIN mg/dL 0.6   ALK PHOS U/L 93   ALT (SGPT) U/L 17   AST (SGOT) U/L 20             Results from last 7 days   Lab Units 06/06/19  1735   WBC 10*3/mm3 19.42*   HEMOGLOBIN g/dL 13.5   HEMATOCRIT % 40.7   PLATELETS 10*3/mm3 207       Results from last 7 days   Lab Units 06/06/19  1734   INR  1.07     Imaging Results (last 7 days)     Procedure Component Value Units Date/Time    XR Hip With or Without Pelvis 2 - 3 View Right [[id number]]  Collected:  06/06/19 1732     Updated:  06/06/19 1756    Narrative:       Procedure:  Right hip, pelvis        Indication:  Trauma, patient fell.   .    Technique:  Two views right hip. Single frontal view pelvis.   .    Prior relevant exam: CT abdomen pelvis May 14, 2019.      No evidence of acute bony, soft tissue, or joint abnormality is  noted. Bone mineralization is within normal limits. The  visualized joint spaces appear intact. Arteriovascular  calcifications right femoral artery.        Impression:       CONCLUSION:   1.  Normal right hip, pelvis.       Electronically signed by:  Sabino Hastings MD  6/6/2019 5:55 PM CDT  Workstation: [id number]    XR Chest 1 View [[id number]] Collected:  06/06/19 1732     Updated:  06/06/19 1752    Narrative:       PROCEDURE: XR CHEST 1 VW    VIEWS:Single    INDICATION: Shortness of breath, history of lung cancer    COMPARISON: CT chest dated 5/15/2019 and chest x-ray dated  5/14/2019    FINDINGS:       - lines/tubes: None    - cardiac: Size within normal limits.    - mediastinum: Atherosclerotic calcification of the aorta.  Mediastinal contour is unchanged since the previous study. Lungs  are hyperinflated.     - lungs: No evidence of a focal air space process  2. Chronic appearing interstitial prominence.   - pleura: No evidence of  fluid.      - osseous: Unremarkable for age.      Impression:       Chronic changes as above. No acute abnormality identified.      Electronically signed by:  Lona Hancock MD  6/6/2019 5:51 PM CDT  Workstation: [id number]          Assessment / Plan       Hospital Problem List:  Active Problems:  Acute on chronic respiratory failure with hypoxia (secondary to COPD exacerbation) complicated by acute metabolic encephalopathy: Patient will be admitted, continued on noninvasive respiratory therapy, bronchodilators, steroids and empiric antimicrobial therapy.     Possible sepsis: Patient did screen positive for sepsis but meets criteria for SIRS.  Chest x-ray  does not show any acute changes or pneumonia and lactic acid is 1.4.  We will check respiratory PCR, continue antimicrobial therapy pending outcome of blood cultures.  Leukocytosis is reactive and will be monitored.    Nutrition: Patient is to remain n.p.o. for now pending improvement in his level of consciousness.    Begin GI and DVT prophylaxis.    Additional orders and treatment plan as hospital course dictates.    Overall prognosis is guarded.    CODE STATUS: According to the daughter patient is a 'no CPR/no intubation'.    I discussed the patient's findings and my recommendations with patient's daughter.     Dillon Fulton MD  06/06/19  7:26 PM        EMR Dragon/Transcription disclaimer:   Much of this encounter note is an electronic transcription/translation of spoken language to printed text. The electronic translation of spoken language may permit erroneous, or at times, nonsensical words or phrases to be inadvertently transcribed; Although I have reviewed the note for such errors, some may still exist.

## 2019-06-07 NOTE — PROGRESS NOTES
[facility] Medicine Services  INPATIENT PROGRESS NOTE    Length of Stay: 1  Date of Admission: 6/6/2019  Primary Care Physician: Thomas Pierson MD    Subjective     HPI:  The patient is a 79 y.o. male who is being treated for the pathologies listed below.     Interval History:   Severe respiratory distress upon evaluation during am rounds.   I explained to the POA that I would need to intubate the patient. But given advanced directive & confirmation w/ POA the patient is to be DNR/DNI & comfort measures be started. I agree with the decision & respect the patient's advanced directive.     Review of Systems   Unable to perform ROS: Severe respiratory distress          Objective    Temp:  [97.2 °F (36.2 °C)-101.4 °F (38.6 °C)] 97.2 °F (36.2 °C)  Heart Rate:  [] 107  Resp:  [16-32] 27  BP: (139-187)/() 152/81  FiO2 (%):  [40 %] 40 %    Physical Exam   Constitutional: He appears toxic. He appears distressed.   HENT:   Head: Normocephalic and atraumatic.   Eyes: No scleral icterus.   Neck: No JVD present.   Cardiovascular: Exam reveals distant heart sounds.   Pulmonary/Chest: He is in respiratory distress. He has wheezes.   Diffusely diminished air entry b/l     Abdominal: There is no tenderness. There is no rebound and no guarding.   Musculoskeletal: He exhibits no tenderness.   Neurological: He is alert. He is disoriented.   Skin: Skin is warm and dry.           Results Review:  I have reviewed the labs, radiology results, and diagnostic studies.  I have reviewed the patient's current medications.     Laboratory Data:   Results from last 7 days   Lab Units 06/07/19  0518 06/06/19  1735   SODIUM mmol/L 135* 138   POTASSIUM mmol/L 4.4 4.5   CHLORIDE mmol/L 105 105   CO2 mmol/L 14.0* 18.0*   BUN mg/dL 24* 19   CREATININE mg/dL 1.28* 1.27   GLUCOSE mg/dL 214* 133*   CALCIUM mg/dL 8.1* 8.7   BILIRUBIN mg/dL  --  0.6   ALK PHOS U/L  --  93   ALT (SGPT) U/L  --  17   AST  (SGOT) U/L  --  20   ANION GAP mmol/L 16.0 15.0     Estimated Creatinine Clearance: 36.2 mL/min (A) (by C-G formula based on SCr of 1.28 mg/dL (H)).          Results from last 7 days   Lab Units 06/07/19  0519 06/06/19  1735   WBC 10*3/mm3 14.90* 19.42*   HEMOGLOBIN g/dL 12.2* 13.5   HEMATOCRIT % 37.6 40.7   PLATELETS 10*3/mm3 156 207     Results from last 7 days   Lab Units 06/06/19  1734   INR  1.07       Culture Data:   No results found for: BLOODCX  No results found for: URINECX  No results found for: RESPCX  No results found for: WOUNDCX  No results found for: STOOLCX  No components found for: BODYFLD    Radiology Data:   Imaging Results (last 24 hours)     Procedure Component Value Units Date/Time    XR Hip With or Without Pelvis 2 - 3 View Right [[id number]] Collected:  06/06/19 1732     Updated:  06/06/19 1756    Narrative:       Procedure:  Right hip, pelvis        Indication:  Trauma, patient fell.   .    Technique:  Two views right hip. Single frontal view pelvis.   .    Prior relevant exam: CT abdomen pelvis May 14, 2019.      No evidence of acute bony, soft tissue, or joint abnormality is  noted. Bone mineralization is within normal limits. The  visualized joint spaces appear intact. Arteriovascular  calcifications right femoral artery.        Impression:       CONCLUSION:   1.  Normal right hip, pelvis.       Electronically signed by:  Sabino Hastings MD  6/6/2019 5:55 PM CDT  Workstation: [id number]    XR Chest 1 View [[id number]] Collected:  06/06/19 1732     Updated:  06/06/19 1752    Narrative:       PROCEDURE: XR CHEST 1 VW    VIEWS:Single    INDICATION: Shortness of breath, history of lung cancer    COMPARISON: CT chest dated 5/15/2019 and chest x-ray dated  5/14/2019    FINDINGS:       - lines/tubes: None    - cardiac: Size within normal limits.    - mediastinum: Atherosclerotic calcification of the aorta.  Mediastinal contour is unchanged since the previous study. Lungs  are hyperinflated.     - lungs:  No evidence of a focal air space process  2. Chronic appearing interstitial prominence.   - pleura: No evidence of  fluid.      - osseous: Unremarkable for age.      Impression:       Chronic changes as above. No acute abnormality identified.      Electronically signed by:  Lona Hancock MD  6/6/2019 5:51 PM CDT  Workstation: [id number]            Assessment/Plan     #Acute on chronic Hypoxemic respiratory failure  #Acute COPD Exacerbation, severe  #Acute Human Rhinovirus Infection  #Acute Encephalopathy likely 2/2 acute infectious process aggregated by hypoxemia  #BETY  #Malnutrition, BMI 16.83  #Cachexia     Plan    -initiate comfort measures     Medications reviewed. Labs reviewed. Records reviewed. Case was discussed w/ the patient. All concerns were addressed & questions were answered.     Discharge Planning: Discussed w/ POA. Read advanced directive paper work. Patient will now be DNR/DNI & comfort measures will be started. Transfer to palliative care unit    Prognosis: very poor

## 2019-06-08 PROBLEM — Z51.5 END OF LIFE CARE: Status: ACTIVE | Noted: 2019-01-01

## 2019-06-08 NOTE — PROGRESS NOTES
[facility] Medicine Services  INPATIENT PROGRESS NOTE    Length of Stay: 2  Date of Admission: 6/6/2019  Primary Care Physician: Thomas Pierson MD    Subjective   Chief Complaint: End-of-life care  HPI: 79-year-old  male with past medical history of metastatic lung cancer, COPD, gastroesophageal reflux disease who presented on 6/6/2019 with complaints of dyspnea, declining functional status, and poor oral intake.  The patient's daughter reported that he was also experiencing repeated falls.  Upon initial evaluation, the patient was noted to be febrile, tachypneic, and had elevation in white blood cell count at level of 19.  The patient was admitted with acute on chronic respiratory failure with hypoxia as well as possible sepsis.  During the hospital stay, the patient's respiratory status declined and discussion was held between Dr Mclean and patient's family.  The patient's family opted for DNR/DNI and comfort measures only.  During today's visit, the patient is unresponsive.  Daughter at bedside reports he is rested comfortably overnight and family has no complaints.    Review of Systems   Unable to perform ROS: Patient unresponsive        All pertinent negatives and positives are as above. All other systems have been reviewed and are negative unless otherwise stated.     Objective    Heart Rate:  [110-116] 116  Resp:  [10-12] 12    Physical Exam   Constitutional: He appears cachectic.   HENT:   Head: Normocephalic and atraumatic.   Eyes: Lids are normal.   Eyes rolled backward   Cardiovascular: Tachycardia present. Exam reveals decreased pulses.   Pulmonary/Chest: Apnea noted. No respiratory distress.   Cheyne Stoke respirations noted   Abdominal: Soft. Bowel sounds are normal.   Musculoskeletal: He exhibits no edema.   Neurological: He is unresponsive.   Skin: Skin is dry. There is pallor.   Nursing note and vitals reviewed.          Results Review:  I have  reviewed the labs, radiology results, and diagnostic studies.    Laboratory Data:   Results from last 7 days   Lab Units 06/07/19  0518 06/06/19  1735   SODIUM mmol/L 135* 138   POTASSIUM mmol/L 4.4 4.5   CHLORIDE mmol/L 105 105   CO2 mmol/L 14.0* 18.0*   BUN mg/dL 24* 19   CREATININE mg/dL 1.28* 1.27   GLUCOSE mg/dL 214* 133*   CALCIUM mg/dL 8.1* 8.7   BILIRUBIN mg/dL  --  0.6   ALK PHOS U/L  --  93   ALT (SGPT) U/L  --  17   AST (SGOT) U/L  --  20   ANION GAP mmol/L 16.0 15.0     Estimated Creatinine Clearance: 36.2 mL/min (A) (by C-G formula based on SCr of 1.28 mg/dL (H)).          Results from last 7 days   Lab Units 06/07/19  0519 06/06/19  1735   WBC 10*3/mm3 14.90* 19.42*   HEMOGLOBIN g/dL 12.2* 13.5   HEMATOCRIT % 37.6 40.7   PLATELETS 10*3/mm3 156 207     Results from last 7 days   Lab Units 06/06/19  1734   INR  1.07       Culture Data:   Blood Culture   Date Value Ref Range Status   06/06/2019 No growth at 24 hours  Preliminary   06/06/2019 Gram Positive Cocci (A)  Preliminary     No results found for: URINECX  No results found for: RESPCX  No results found for: WOUNDCX  No results found for: STOOLCX  No components found for: BODYFLD    Radiology Data:   Imaging Results (last 24 hours)     ** No results found for the last 24 hours. **          I have reviewed the patient's current medications.     Assessment/Plan     Active Hospital Problems    Diagnosis   • **End of life care       Plan:    -Morphine as needed for pain/air hunger  -Haldol and Ativan as needed available for agitation/anxiety.  -Scopolamine, atropine available for management of secretions.    Discharge Planning: Patient actively dying and death expected within 24 hours.  Family at bedside made aware of patient's condition.          This document has been electronically signed by LUIS ALBERTO Trejo on June 8, 2019 1:07 PM

## 2019-06-08 NOTE — PLAN OF CARE
Problem: Patient Care Overview  Goal: Plan of Care Review  Outcome: Ongoing (interventions implemented as appropriate)   06/08/19 0132   Coping/Psychosocial   Plan of Care Reviewed With patient;daughter   Plan of Care Review   Progress no change   OTHER   Outcome Summary Patient currently resting with no s/s of pain or distress. Will continue to monitor.      Goal: Individualization and Mutuality  Outcome: Ongoing (interventions implemented as appropriate)    Goal: Discharge Needs Assessment  Outcome: Ongoing (interventions implemented as appropriate)    Goal: Interprofessional Rounds/Family Conf  Outcome: Ongoing (interventions implemented as appropriate)      Problem: Sepsis/Septic Shock (Adult)  Goal: Signs and Symptoms of Listed Potential Problems Will be Absent, Minimized or Managed (Sepsis/Septic Shock)  Outcome: Ongoing (interventions implemented as appropriate)      Problem: Breathing Pattern Ineffective (Adult)  Goal: Identify Related Risk Factors and Signs and Symptoms  Outcome: Ongoing (interventions implemented as appropriate)    Goal: Effective Oxygenation/Ventilation  Outcome: Ongoing (interventions implemented as appropriate)    Goal: Anxiety/Fear Reduction  Outcome: Ongoing (interventions implemented as appropriate)      Problem: Fall Risk (Adult)  Goal: Identify Related Risk Factors and Signs and Symptoms  Outcome: Ongoing (interventions implemented as appropriate)    Goal: Absence of Fall  Outcome: Outcome(s) achieved Date Met: 06/08/19      Problem: Skin Injury Risk (Adult)  Goal: Identify Related Risk Factors and Signs and Symptoms  Outcome: Ongoing (interventions implemented as appropriate)    Goal: Skin Health and Integrity  Outcome: Ongoing (interventions implemented as appropriate)      Problem: Pain, Chronic (Adult)  Goal: Identify Related Risk Factors and Signs and Symptoms  Outcome: Ongoing (interventions implemented as appropriate)    Goal: Acceptable Pain/Comfort Level and Functional  Ability  Outcome: Ongoing (interventions implemented as appropriate)      Problem: NPPV/CPAP (Adult)  Goal: Signs and Symptoms of Listed Potential Problems Will be Absent, Minimized or Managed (NPPV/CPAP)  Outcome: Outcome(s) achieved Date Met: 06/08/19

## 2019-06-09 NOTE — PLAN OF CARE
Problem: Patient Care Overview  Goal: Plan of Care Review  Outcome: Ongoing (interventions implemented as appropriate)   06/09/19 0013   Coping/Psychosocial   Plan of Care Reviewed With patient   Plan of Care Review   Progress declining   OTHER   Outcome Summary Patient currently resting with no s/s of pain or distress. Will continue to monitor.      Goal: Individualization and Mutuality  Outcome: Ongoing (interventions implemented as appropriate)    Goal: Discharge Needs Assessment  Outcome: Ongoing (interventions implemented as appropriate)    Goal: Interprofessional Rounds/Family Conf  Outcome: Ongoing (interventions implemented as appropriate)      Problem: Sepsis/Septic Shock (Adult)  Goal: Signs and Symptoms of Listed Potential Problems Will be Absent, Minimized or Managed (Sepsis/Septic Shock)  Outcome: Ongoing (interventions implemented as appropriate)      Problem: Breathing Pattern Ineffective (Adult)  Goal: Identify Related Risk Factors and Signs and Symptoms  Outcome: Ongoing (interventions implemented as appropriate)    Goal: Effective Oxygenation/Ventilation  Outcome: Ongoing (interventions implemented as appropriate)    Goal: Anxiety/Fear Reduction  Outcome: Ongoing (interventions implemented as appropriate)      Problem: Fall Risk (Adult)  Goal: Identify Related Risk Factors and Signs and Symptoms  Outcome: Ongoing (interventions implemented as appropriate)      Problem: Skin Injury Risk (Adult)  Goal: Identify Related Risk Factors and Signs and Symptoms  Outcome: Ongoing (interventions implemented as appropriate)    Goal: Skin Health and Integrity  Outcome: Ongoing (interventions implemented as appropriate)      Problem: Pain, Chronic (Adult)  Goal: Identify Related Risk Factors and Signs and Symptoms  Outcome: Ongoing (interventions implemented as appropriate)    Goal: Acceptable Pain/Comfort Level and Functional Ability  Outcome: Ongoing (interventions implemented as appropriate)

## 2019-06-09 NOTE — PROGRESS NOTES
[facility] Medicine Services  INPATIENT PROGRESS NOTE    Length of Stay: 3  Date of Admission: 6/6/2019  Primary Care Physician: Thomas Pierson MD    Subjective   Chief Complaint: End-of-life care  HPI: 79-year-old  male with past medical history of metastatic lung cancer, COPD, gastroesophageal reflux disease who presented on 6/6/2019 with complaints of dyspnea, declining functional status, and poor oral intake.  The patient's daughter reported that he was also experiencing repeated falls.  Upon initial evaluation, the patient was noted to be febrile, tachypneic, and had elevation in white blood cell count at level of 19.  The patient was admitted with acute on chronic respiratory failure with hypoxia as well as possible sepsis.  During the hospital stay, the patient's respiratory status declined and discussion was held between Dr Mclean and patient's family.  The patient's family opted for DNR/DNI and comfort measures only.  During today's visit, the patient is unresponsive.  Daughter at bedside reports his breathing pattern has changed overnight and that he is experiencing longer periods of apnea.     Review of Systems   Unable to perform ROS: Patient unresponsive        All pertinent negatives and positives are as above. All other systems have been reviewed and are negative unless otherwise stated.     Objective         Physical Exam   Constitutional: He appears cachectic.   HENT:   Head: Normocephalic and atraumatic.   Eyes: Lids are normal.   Eyes rolled backward   Cardiovascular: Tachycardia present. Exam reveals decreased pulses.   Pulmonary/Chest: Apnea noted. No respiratory distress.   Cheyne Stoke respirations noted   Abdominal: Soft. Bowel sounds are normal.   Musculoskeletal: He exhibits no edema.   Neurological: He is unresponsive.   Skin: Skin is dry. There is pallor.   Nursing note and vitals reviewed.          Results Review:  I have reviewed the  labs, radiology results, and diagnostic studies.    Laboratory Data:   Results from last 7 days   Lab Units 06/07/19  0518 06/06/19  1735   SODIUM mmol/L 135* 138   POTASSIUM mmol/L 4.4 4.5   CHLORIDE mmol/L 105 105   CO2 mmol/L 14.0* 18.0*   BUN mg/dL 24* 19   CREATININE mg/dL 1.28* 1.27   GLUCOSE mg/dL 214* 133*   CALCIUM mg/dL 8.1* 8.7   BILIRUBIN mg/dL  --  0.6   ALK PHOS U/L  --  93   ALT (SGPT) U/L  --  17   AST (SGOT) U/L  --  20   ANION GAP mmol/L 16.0 15.0     Estimated Creatinine Clearance: 36.2 mL/min (A) (by C-G formula based on SCr of 1.28 mg/dL (H)).          Results from last 7 days   Lab Units 06/07/19  0519 06/06/19  1735   WBC 10*3/mm3 14.90* 19.42*   HEMOGLOBIN g/dL 12.2* 13.5   HEMATOCRIT % 37.6 40.7   PLATELETS 10*3/mm3 156 207     Results from last 7 days   Lab Units 06/06/19  1734   INR  1.07       Culture Data:   Blood Culture   Date Value Ref Range Status   06/06/2019 No growth at 2 days  Preliminary   06/06/2019 Gram Positive Cocci (A)  Preliminary     No results found for: URINECX  No results found for: RESPCX  No results found for: WOUNDCX  No results found for: STOOLCX  No components found for: BODYFLD    Radiology Data:   Imaging Results (last 24 hours)     ** No results found for the last 24 hours. **          I have reviewed the patient's current medications.     Assessment/Plan     Active Hospital Problems    Diagnosis   • **End of life care       Plan:    -Morphine as needed for pain/air hunger  -Haldol and Ativan as needed available for agitation/anxiety.  -Scopolamine, atropine available for management of secretions.    Discharge Planning: Patient actively dying and death expected within 24 hours.  Family at bedside made aware of patient's condition.          This document has been electronically signed by LUIS ALBERTO Trejo on June 9, 2019 9:38 AM

## 2019-06-10 LAB
BACTERIA SPEC AEROBE CULT: ABNORMAL
GRAM STN SPEC: ABNORMAL
ISOLATED FROM: ABNORMAL

## 2019-06-10 NOTE — PAYOR COMM NOTE
"Era Brumfield (Dcsd. Male)     Date of Birth Social Security Number Address Home Phone MRN    [id number]  [id number] [address] [phone] [id number]    [employer] Marital Status          Non-Latter-day        Admission Date Admission Type Admitting Provider Attending Provider Department, Room/Bed    19 Emergency Wolfgang Rabago MD  [address], 416/1    Discharge Date Discharge Disposition Discharge Destination        2019               Attending Provider:  (none)   Allergies:  No Known Allergies    Isolation:  Contact Drop   Infection:  Rhinovirus  (19)   Code Status:  Prior    Ht:  180.3 cm (71\")   Wt:  54.7 kg (120 lb 11.2 oz)    Admission Cmt:  None   Principal Problem:  End of life care [Z51.5]                 Active Insurance as of 2019     Primary Coverage     Payor Plan Insurance Group Employer/Plan Group    MEDICARE MEDICARE A & B      Payor Plan Address Payor Plan Phone Number Payor Plan Fax Number Effective Dates    [address] [phone]  1996 - None Entered    [facility] [id number]       Subscriber Name Subscriber Birth Date Member ID       ERA BRUMFIELD 1940 [id number]           Secondary Coverage     Payor Plan Insurance Group Employer/Plan Group    [facility]     Payor Plan Address Payor Plan Phone Number Payor Plan Fax Number Effective Dates    [address] [address]   2017 - None Entered    [facility] [address]       Subscriber Name Subscriber Birth Date Member ID       ERA BRUMFIELD 1940 [id number]                 Emergency Contacts      (Rel.) Home Phone Work Phone Mobile Phone    Jacque Sosa (Daughter) -- -- [phone]    OctavianoTammy (Spouse) [phone] -- [phone]                                    Maryann Mccarty  [facility]  (P) [phone]  (F) [phone]      Patient  on                               Discharge Order " (From admission, onward)    None

## 2019-06-10 NOTE — DISCHARGE SUMMARY
[facility] Medicine Services  DEATH SUMMARY       Date of Admission: 6/6/2019  Date of Death:  06/09/2019 at approximately 1930  Primary Care Physician: Thomas Pierson MD    Presenting Problem/History of Present Illness:  Pneumonia [J18.9]  SOB (shortness of breath) [R06.02]     Final Death Diagnoses:  Active Hospital Problems    Diagnosis   • **End of life care       Consults:   Consults     No orders found from 5/8/2019 to 6/7/2019.          Procedures Performed:                 Pertinent Test Results:   Lab Results (last 24 hours)     Procedure Component Value Units Date/Time    Blood Culture - Blood, Hand, Left [[id number]] Collected:  06/06/19 1823    Specimen:  Blood from Hand, Left Updated:  06/09/19 1845     Blood Culture No growth at 3 days        Imaging Results (all)     Procedure Component Value Units Date/Time    XR Hip With or Without Pelvis 2 - 3 View Right [[id number]] Collected:  06/06/19 1732     Updated:  06/06/19 1756    Narrative:       Procedure:  Right hip, pelvis        Indication:  Trauma, patient fell.   .    Technique:  Two views right hip. Single frontal view pelvis.   .    Prior relevant exam: CT abdomen pelvis May 14, 2019.      No evidence of acute bony, soft tissue, or joint abnormality is  noted. Bone mineralization is within normal limits. The  visualized joint spaces appear intact. Arteriovascular  calcifications right femoral artery.        Impression:       CONCLUSION:   1.  Normal right hip, pelvis.       Electronically signed by:  Sabino Hastings MD  6/6/2019 5:55 PM CDT  Workstation: [id number]    XR Chest 1 View [[id number]] Collected:  06/06/19 1732     Updated:  06/06/19 1752    Narrative:       PROCEDURE: XR CHEST 1 VW    VIEWS:Single    INDICATION: Shortness of breath, history of lung cancer    COMPARISON: CT chest dated 5/15/2019 and chest x-ray dated  5/14/2019    FINDINGS:       - lines/tubes: None    - cardiac: Size within normal  limits.    - mediastinum: Atherosclerotic calcification of the aorta.  Mediastinal contour is unchanged since the previous study. Lungs  are hyperinflated.     - lungs: No evidence of a focal air space process  2. Chronic appearing interstitial prominence.   - pleura: No evidence of  fluid.      - osseous: Unremarkable for age.      Impression:       Chronic changes as above. No acute abnormality identified.      Electronically signed by:  Lona Hancock MD  2019 5:51 PM CDT  Workstation: [id number]            Hospital Course:    79-year-old  male with past medical history of metastatic lung cancer, COPD, gastroesophageal reflux disease who presented on 2019 with complaints of dyspnea, declining functional status, and poor oral intake.  The patient's daughter reported that he was also experiencing repeated falls.  Upon initial evaluation, the patient was noted to be febrile, tachypneic, and had elevation in white blood cell count at level of 19.  The patient was admitted with acute on chronic respiratory failure with hypoxia as well as possible sepsis.  During the hospital stay, the patient's respiratory status declined and discussion was held between Dr Mclean and patient's family.  The patient's family opted for DNR/DNI and comfort measures only. The patient  on 19 at 1930 with family at bedside.             This document has been electronically signed by LUIS ALBERTO Trejo on Kaitlin 10, 2019 3:51 PM

## 2019-06-11 LAB — BACTERIA SPEC AEROBE CULT: NORMAL
